# Patient Record
Sex: FEMALE | Race: ASIAN
[De-identification: names, ages, dates, MRNs, and addresses within clinical notes are randomized per-mention and may not be internally consistent; named-entity substitution may affect disease eponyms.]

---

## 2018-01-09 ENCOUNTER — HOSPITAL ENCOUNTER (EMERGENCY)
Dept: HOSPITAL 15 - ER | Age: 78
LOS: 1 days | Discharge: LEFT BEFORE DISCHARGE | End: 2018-01-10
Payer: MEDICARE

## 2018-01-09 VITALS — BODY MASS INDEX: 24.8 KG/M2 | HEIGHT: 63 IN | WEIGHT: 140 LBS

## 2018-01-09 DIAGNOSIS — J06.9: Primary | ICD-10-CM

## 2018-01-09 DIAGNOSIS — K72.90: ICD-10-CM

## 2018-01-09 LAB
ALBUMIN SERPL-MCNC: 3.5 G/DL (ref 3.4–5)
ALP SERPL-CCNC: 127 U/L (ref 45–117)
ALT SERPL-CCNC: 476 U/L (ref 13–56)
ANION GAP SERPL CALCULATED.3IONS-SCNC: 10 MMOL/L (ref 5–15)
BILIRUB SERPL-MCNC: 1.3 MG/DL (ref 0.2–1)
BUN SERPL-MCNC: 20 MG/DL (ref 7–18)
BUN/CREAT SERPL: 21.1
CALCIUM SERPL-MCNC: 8.3 MG/DL (ref 8.5–10.1)
CHLORIDE SERPL-SCNC: 112 MMOL/L (ref 98–107)
CO2 SERPL-SCNC: 22 MMOL/L (ref 21–32)
GLUCOSE SERPL-MCNC: 94 MG/DL (ref 74–106)
HCT VFR BLD AUTO: 38.5 % (ref 36–46)
HGB BLD-MCNC: 12.9 G/DL (ref 12.2–16.2)
MCH RBC QN AUTO: 30.9 PG (ref 28–32)
MCV RBC AUTO: 92.2 FL (ref 80–100)
NRBC BLD QL AUTO: 0.2 %
POTASSIUM SERPL-SCNC: 4.4 MMOL/L (ref 3.5–5.1)
PROT SERPL-MCNC: 7.4 G/DL (ref 6.4–8.2)
SODIUM SERPL-SCNC: 144 MMOL/L (ref 136–145)

## 2018-01-09 PROCEDURE — 85025 COMPLETE CBC W/AUTO DIFF WBC: CPT

## 2018-01-09 PROCEDURE — 93005 ELECTROCARDIOGRAM TRACING: CPT

## 2018-01-09 PROCEDURE — 81001 URINALYSIS AUTO W/SCOPE: CPT

## 2018-01-09 PROCEDURE — 71046 X-RAY EXAM CHEST 2 VIEWS: CPT

## 2018-01-09 PROCEDURE — 36415 COLL VENOUS BLD VENIPUNCTURE: CPT

## 2018-01-09 PROCEDURE — 80053 COMPREHEN METABOLIC PANEL: CPT

## 2018-01-10 VITALS — DIASTOLIC BLOOD PRESSURE: 62 MMHG | SYSTOLIC BLOOD PRESSURE: 106 MMHG

## 2019-07-12 ENCOUNTER — HOSPITAL ENCOUNTER (INPATIENT)
Dept: HOSPITAL 15 - ER | Age: 79
LOS: 3 days | Discharge: HOME | DRG: 602 | End: 2019-07-15
Attending: FAMILY MEDICINE | Admitting: NURSE PRACTITIONER
Payer: MEDICARE

## 2019-07-12 VITALS — HEIGHT: 63 IN | WEIGHT: 161.38 LBS | BODY MASS INDEX: 28.59 KG/M2

## 2019-07-12 DIAGNOSIS — I11.0: ICD-10-CM

## 2019-07-12 DIAGNOSIS — K21.9: ICD-10-CM

## 2019-07-12 DIAGNOSIS — Z95.0: ICD-10-CM

## 2019-07-12 DIAGNOSIS — Z88.0: ICD-10-CM

## 2019-07-12 DIAGNOSIS — L02.31: Primary | ICD-10-CM

## 2019-07-12 DIAGNOSIS — G47.00: ICD-10-CM

## 2019-07-12 DIAGNOSIS — R62.7: ICD-10-CM

## 2019-07-12 DIAGNOSIS — Z79.899: ICD-10-CM

## 2019-07-12 DIAGNOSIS — J18.9: ICD-10-CM

## 2019-07-12 DIAGNOSIS — Z88.2: ICD-10-CM

## 2019-07-12 DIAGNOSIS — I50.9: ICD-10-CM

## 2019-07-12 DIAGNOSIS — L03.317: ICD-10-CM

## 2019-07-12 LAB
ALBUMIN SERPL-MCNC: 3 G/DL (ref 3.4–5)
ALP SERPL-CCNC: 105 U/L (ref 45–117)
ALT SERPL-CCNC: 12 U/L (ref 13–56)
ANION GAP SERPL CALCULATED.3IONS-SCNC: 10 MMOL/L (ref 5–15)
APTT PPP: 26.3 SEC (ref 23.64–32.05)
BILIRUB SERPL-MCNC: 0.3 MG/DL (ref 0.2–1)
BUN SERPL-MCNC: 18 MG/DL (ref 7–18)
BUN/CREAT SERPL: 20.2
CALCIUM SERPL-MCNC: 8 MG/DL (ref 8.5–10.1)
CHLORIDE SERPL-SCNC: 111 MMOL/L (ref 98–107)
CO2 SERPL-SCNC: 22 MMOL/L (ref 21–32)
GLUCOSE SERPL-MCNC: 98 MG/DL (ref 74–106)
HCT VFR BLD AUTO: 35 % (ref 36–46)
HGB BLD-MCNC: 11 G/DL (ref 12.2–16.2)
INR PPP: 1 (ref 0.9–1.15)
LACTATE PLASV-SCNC: 2.9 MMOL/L (ref 0.4–2)
MCH RBC QN AUTO: 24.2 PG (ref 28–32)
MCV RBC AUTO: 76.7 FL (ref 80–100)
NRBC BLD QL AUTO: 0 %
POTASSIUM SERPL-SCNC: 4 MMOL/L (ref 3.5–5.1)
PROT SERPL-MCNC: 7.1 G/DL (ref 6.4–8.2)
SODIUM SERPL-SCNC: 143 MMOL/L (ref 136–145)

## 2019-07-12 PROCEDURE — 83605 ASSAY OF LACTIC ACID: CPT

## 2019-07-12 PROCEDURE — 81001 URINALYSIS AUTO W/SCOPE: CPT

## 2019-07-12 PROCEDURE — 84484 ASSAY OF TROPONIN QUANT: CPT

## 2019-07-12 PROCEDURE — 83880 ASSAY OF NATRIURETIC PEPTIDE: CPT

## 2019-07-12 PROCEDURE — 85025 COMPLETE CBC W/AUTO DIFF WBC: CPT

## 2019-07-12 PROCEDURE — 96365 THER/PROPH/DIAG IV INF INIT: CPT

## 2019-07-12 PROCEDURE — 80048 BASIC METABOLIC PNL TOTAL CA: CPT

## 2019-07-12 PROCEDURE — 85610 PROTHROMBIN TIME: CPT

## 2019-07-12 PROCEDURE — 85730 THROMBOPLASTIN TIME PARTIAL: CPT

## 2019-07-12 PROCEDURE — 71045 X-RAY EXAM CHEST 1 VIEW: CPT

## 2019-07-12 PROCEDURE — 93005 ELECTROCARDIOGRAM TRACING: CPT

## 2019-07-12 PROCEDURE — 87040 BLOOD CULTURE FOR BACTERIA: CPT

## 2019-07-12 PROCEDURE — 36415 COLL VENOUS BLD VENIPUNCTURE: CPT

## 2019-07-12 PROCEDURE — 80053 COMPREHEN METABOLIC PANEL: CPT

## 2019-07-12 PROCEDURE — 94640 AIRWAY INHALATION TREATMENT: CPT

## 2019-07-13 VITALS — SYSTOLIC BLOOD PRESSURE: 94 MMHG | DIASTOLIC BLOOD PRESSURE: 49 MMHG

## 2019-07-13 VITALS — SYSTOLIC BLOOD PRESSURE: 111 MMHG | DIASTOLIC BLOOD PRESSURE: 62 MMHG

## 2019-07-13 VITALS — DIASTOLIC BLOOD PRESSURE: 70 MMHG | SYSTOLIC BLOOD PRESSURE: 115 MMHG

## 2019-07-13 VITALS — SYSTOLIC BLOOD PRESSURE: 96 MMHG | DIASTOLIC BLOOD PRESSURE: 52 MMHG

## 2019-07-13 VITALS — SYSTOLIC BLOOD PRESSURE: 115 MMHG | DIASTOLIC BLOOD PRESSURE: 70 MMHG

## 2019-07-13 RX ADMIN — ATENOLOL SCH MG: 25 TABLET ORAL at 21:36

## 2019-07-13 RX ADMIN — FAMOTIDINE SCH MG: 20 TABLET, FILM COATED ORAL at 21:36

## 2019-07-13 RX ADMIN — FAMOTIDINE SCH MG: 20 TABLET, FILM COATED ORAL at 10:00

## 2019-07-13 RX ADMIN — CLINDAMYCIN PHOSPHATE SCH MLS/HR: 150 INJECTION, SOLUTION INTRAVENOUS at 21:36

## 2019-07-13 RX ADMIN — HYDROCODONE BITARTRATE AND ACETAMINOPHEN PRN TAB: 5; 325 TABLET ORAL at 07:19

## 2019-07-13 RX ADMIN — CLINDAMYCIN PHOSPHATE SCH MLS/HR: 150 INJECTION, SOLUTION INTRAVENOUS at 05:27

## 2019-07-13 RX ADMIN — ATENOLOL SCH MG: 25 TABLET ORAL at 10:00

## 2019-07-13 RX ADMIN — HYDROCODONE BITARTRATE AND ACETAMINOPHEN PRN TAB: 5; 325 TABLET ORAL at 18:54

## 2019-07-13 RX ADMIN — CLINDAMYCIN PHOSPHATE SCH MLS/HR: 150 INJECTION, SOLUTION INTRAVENOUS at 14:28

## 2019-07-13 NOTE — NUR
MS admit from ER

DAVID MARS admitted to tele/MS after SBAR received.  Patient oriented to Carly Garcia, primary RN, unit, room, bed, and unit policies regarding patient care and visiting 
hours. Patient weighed by bedscale and encouraged to call if they need something. All 
questions and concerns addressed, patient verbalized understanding. 

Note:

## 2019-07-13 NOTE — NUR
Respiratory note: ASSESSED PT FOR PRN MED NEB AT THIS TIME, NO RESP DISTRESS NOTED, NO TX 
INDICATED, PULSE OX 93% ON RA, HR 78, RR 22, BILATERAL BS CRACKLES.

## 2019-07-13 NOTE — NUR
OPENING SHIFT NOTE: PATIENT IS RESTING IN BED. NO COMPLAINTS OF PAIN OR SOB. SITTER IS 
BEDSIDE. CALL LIGHT IS WITHIN REACH. BED IS LOCKED IN LOWEST POSITION AND SIDE RAILS UP X2. 
WILL CONTINUE TO MONITOR.

## 2019-07-13 NOTE — NUR
Respiratory note:

ASSESSED PT FOR PRN TX PT WAS AWAKE AND ALERT NO RESP DISTRESS NOTED. HR 79, RR 16, SPO2 96% 
ON ROOM AIR. BS ARE CLEAR, NO INDICATION FOR TX AT THIS TIME. PT KNOWS TO HAVE RT PAGED IF 
TX IS NEEDED.

## 2019-07-13 NOTE — NUR
PT STATES SHE IS IN PAIN-BUT IS REFUSING TYLENOL WHICH IS WHAT THE MD HAS ORDERED FOR HER.PT 
STATES SHE WANTS THREE "STRESS PILLS' BUT DOES NOT KNOW THE NAME OF THE STRESS PILLS.PT 
AMBULATED TO THE BR WITH STANDBY ASSISTANCE;BACK IN BED. PT INFORMED THE  RN IN [place]ian VIA 
CNA   THAT THE MD DOWNSTAIRS SAID WE WERE TO GIVE HER ANYTHING SHE WANTS.

## 2019-07-13 NOTE — NUR
Opening Shift Note

Assumed care of patient, awake and alert.  No S/S of distress/SOB or pain.  Instructed on 
POC and to call for assist PRN, will continue to monitor for changes Q1hr and PRN. Bed 
locked in lowest position with two side rails up and call light  in reach.

## 2019-07-14 VITALS — SYSTOLIC BLOOD PRESSURE: 93 MMHG | DIASTOLIC BLOOD PRESSURE: 51 MMHG

## 2019-07-14 VITALS — SYSTOLIC BLOOD PRESSURE: 93 MMHG | DIASTOLIC BLOOD PRESSURE: 54 MMHG

## 2019-07-14 VITALS — DIASTOLIC BLOOD PRESSURE: 75 MMHG | SYSTOLIC BLOOD PRESSURE: 126 MMHG

## 2019-07-14 VITALS — SYSTOLIC BLOOD PRESSURE: 101 MMHG | DIASTOLIC BLOOD PRESSURE: 61 MMHG

## 2019-07-14 VITALS — DIASTOLIC BLOOD PRESSURE: 61 MMHG | SYSTOLIC BLOOD PRESSURE: 101 MMHG

## 2019-07-14 LAB
ANION GAP SERPL CALCULATED.3IONS-SCNC: 8 MMOL/L (ref 5–15)
BUN SERPL-MCNC: 19 MG/DL (ref 7–18)
BUN/CREAT SERPL: 27.9
CALCIUM SERPL-MCNC: 8.2 MG/DL (ref 8.5–10.1)
CHLORIDE SERPL-SCNC: 113 MMOL/L (ref 98–107)
CO2 SERPL-SCNC: 21 MMOL/L (ref 21–32)
GLUCOSE SERPL-MCNC: 81 MG/DL (ref 74–106)
HCT VFR BLD AUTO: 33.6 % (ref 36–46)
HGB BLD-MCNC: 10.8 G/DL (ref 12.2–16.2)
MCH RBC QN AUTO: 24.9 PG (ref 28–32)
MCV RBC AUTO: 77.6 FL (ref 80–100)
NRBC BLD QL AUTO: 0.1 %
POTASSIUM SERPL-SCNC: 4.7 MMOL/L (ref 3.5–5.1)
SODIUM SERPL-SCNC: 142 MMOL/L (ref 136–145)

## 2019-07-14 RX ADMIN — HYDROCODONE BITARTRATE AND ACETAMINOPHEN PRN TAB: 5; 325 TABLET ORAL at 06:29

## 2019-07-14 RX ADMIN — HYDROCODONE BITARTRATE AND ACETAMINOPHEN PRN TAB: 5; 325 TABLET ORAL at 18:38

## 2019-07-14 RX ADMIN — FAMOTIDINE SCH MG: 20 TABLET, FILM COATED ORAL at 10:00

## 2019-07-14 RX ADMIN — CLINDAMYCIN PHOSPHATE SCH MLS/HR: 150 INJECTION, SOLUTION INTRAVENOUS at 22:01

## 2019-07-14 RX ADMIN — CLINDAMYCIN PHOSPHATE SCH MLS/HR: 150 INJECTION, SOLUTION INTRAVENOUS at 06:29

## 2019-07-14 RX ADMIN — CLINDAMYCIN PHOSPHATE SCH MLS/HR: 150 INJECTION, SOLUTION INTRAVENOUS at 13:37

## 2019-07-14 RX ADMIN — ATENOLOL SCH MG: 25 TABLET ORAL at 10:00

## 2019-07-14 RX ADMIN — ATENOLOL SCH MG: 25 TABLET ORAL at 22:00

## 2019-07-14 RX ADMIN — FAMOTIDINE SCH MG: 20 TABLET, FILM COATED ORAL at 22:00

## 2019-07-14 NOTE — NUR
CALLED KELLY [facility]AR[facility] -[phone] CELL NUMBER

PER SON HE WORKS FROM [phone] DAILY AND WILL NOT BE ABLE TO PICK HER UP UNTIL AFTER 1530 
MONDAY 7/15/19. 

EVEN THOUGH HE WORKS DAILY SHE DOES HAVE A  HOME HEALTH AGENCY THAT COMES OUT FROM [phone] 
DAILY, PER SON HE DOES NOT KNOW HEALTH CARE AGENCY NAME.

PER KELLY FARIA IT IS OK TO DISCHARGE HOME TOMORROW.

WILL LET DR GARDNER KNOW

## 2019-07-14 NOTE — NUR
RT NOTE: PRN BREATHING TX. NOT INDICATED AT THIS TIME. NO S/S OF RESPIRATORY DISTRESS NOTED. 
PT.HR 68, RR 20, POX 96% R/A. SITTER AT BEDSIDE.

## 2019-07-14 NOTE — NUR
Respiratory note:

ASSESSED PT FOR PRN TX. PT IS CURRENTLY ON ROOM AIR: HR 70, RR 18, SPO2 96%. PT SHOWS NO  
S/S OF RESPIRATORY DISTRESS. MED NEB TX NOT INDICATED AT THIS TIME. WILL CONTINUE TO 
MONITOR.

## 2019-07-14 NOTE — NUR
PATIENTS BLOOD PRESSURES HAVE BEEN ON THE LOWER SIDE THE PAST TWO DAYS LAST BP FOR MY SHIFT 
94/62 HEART RATE 65. ASYMPTOMATIC AMBULATORY.

## 2019-07-15 VITALS — DIASTOLIC BLOOD PRESSURE: 70 MMHG | SYSTOLIC BLOOD PRESSURE: 144 MMHG

## 2019-07-15 VITALS — DIASTOLIC BLOOD PRESSURE: 79 MMHG | SYSTOLIC BLOOD PRESSURE: 146 MMHG

## 2019-07-15 VITALS — DIASTOLIC BLOOD PRESSURE: 51 MMHG | SYSTOLIC BLOOD PRESSURE: 105 MMHG

## 2019-07-15 RX ADMIN — CLINDAMYCIN PHOSPHATE SCH MLS/HR: 150 INJECTION, SOLUTION INTRAVENOUS at 08:40

## 2019-07-15 RX ADMIN — HYDROCODONE BITARTRATE AND ACETAMINOPHEN PRN TAB: 5; 325 TABLET ORAL at 08:40

## 2019-07-15 RX ADMIN — FAMOTIDINE SCH MG: 20 TABLET, FILM COATED ORAL at 10:00

## 2019-07-15 RX ADMIN — ATENOLOL SCH MG: 25 TABLET ORAL at 10:00

## 2019-07-15 NOTE — NUR
Discharge instructions given as ordered. Encourage to follow up with PMD as instructed. All 
questions and concerns addressed. Patient verbalized understanding.  Medication 
reconciliation form completed and copy given to patient.  IV removed with catheter intact, 
pressure dressing applied. Patient taken to vehicle via wheelchair with all personal 
belongings, accompanied by staff and family member. No distress noted at time of departure.

## 2019-07-15 NOTE — NUR
assessment

Patient is a 78 year old Kittitian speaking female who is alert and oriented. Patients 
caregiver Bertha is translating for us. Patients PCP is Dr Andrews. Patient has a rollator for 
home use. Patient has been informed that she has a ss consult for patient is unable to take 
care of herself. 2nd ss consult SNF placement. Patient is refusing SNF. Patient will return 
home with caregiver and son on discharge.  Patient and her caregiver Bertha informed me that 
patient is never alone. Per Bertha she comes in from 8am to 3pm and patients son Jerome is 
with patient from 3pm until 8am. Patient feels safe at home and has no post discharge needs. 

-------------------------------------------------------------------------------

Addendum: 07/15/19 at 1655 by Enedina Chacon 

-------------------------------------------------------------------------------

Amended: Links added.

## 2019-07-15 NOTE — NUR
Respiratory note:

ASSESSED PT FOR PRN MEDNEB TX. HR 71, RR 16, POX 98% ON ROOM AIR. BREATH SOUNDS 
CLEAR/DIMINISHED THROUGHOUT. NO S/S OF RESPIRATORY DISTRESS NOTED. MEDNEB TX NOT INDICATED 
AT THIS TIME. WROTE RT NAME AND PAGER NUMBER ON WHITEBOARD. WILL CONTINUE TO MONITOR.

## 2020-06-23 ENCOUNTER — HOSPITAL ENCOUNTER (INPATIENT)
Dept: HOSPITAL 15 - ER | Age: 80
LOS: 14 days | Discharge: NURSING, REHAB OR LONG-TERM CARE | DRG: 870 | End: 2020-07-07
Attending: SPECIALIST | Admitting: SPECIALIST
Payer: MEDICARE

## 2020-06-23 VITALS — DIASTOLIC BLOOD PRESSURE: 62 MMHG | SYSTOLIC BLOOD PRESSURE: 104 MMHG

## 2020-06-23 VITALS — DIASTOLIC BLOOD PRESSURE: 62 MMHG | SYSTOLIC BLOOD PRESSURE: 99 MMHG

## 2020-06-23 VITALS — DIASTOLIC BLOOD PRESSURE: 62 MMHG | SYSTOLIC BLOOD PRESSURE: 100 MMHG

## 2020-06-23 VITALS — BODY MASS INDEX: 33.22 KG/M2 | WEIGHT: 175.93 LBS | HEIGHT: 61 IN

## 2020-06-23 DIAGNOSIS — K92.0: ICD-10-CM

## 2020-06-23 DIAGNOSIS — G93.41: ICD-10-CM

## 2020-06-23 DIAGNOSIS — I11.0: ICD-10-CM

## 2020-06-23 DIAGNOSIS — J96.00: ICD-10-CM

## 2020-06-23 DIAGNOSIS — J15.212: ICD-10-CM

## 2020-06-23 DIAGNOSIS — N17.9: ICD-10-CM

## 2020-06-23 DIAGNOSIS — B96.20: ICD-10-CM

## 2020-06-23 DIAGNOSIS — Z88.2: ICD-10-CM

## 2020-06-23 DIAGNOSIS — I21.3: ICD-10-CM

## 2020-06-23 DIAGNOSIS — Z95.0: ICD-10-CM

## 2020-06-23 DIAGNOSIS — Z82.49: ICD-10-CM

## 2020-06-23 DIAGNOSIS — A41.51: Primary | ICD-10-CM

## 2020-06-23 DIAGNOSIS — E87.6: ICD-10-CM

## 2020-06-23 DIAGNOSIS — R65.21: ICD-10-CM

## 2020-06-23 DIAGNOSIS — M06.9: ICD-10-CM

## 2020-06-23 DIAGNOSIS — Z20.828: ICD-10-CM

## 2020-06-23 DIAGNOSIS — Z88.0: ICD-10-CM

## 2020-06-23 DIAGNOSIS — K92.2: ICD-10-CM

## 2020-06-23 DIAGNOSIS — E11.9: ICD-10-CM

## 2020-06-23 DIAGNOSIS — E46: ICD-10-CM

## 2020-06-23 DIAGNOSIS — I25.10: ICD-10-CM

## 2020-06-23 DIAGNOSIS — N30.00: ICD-10-CM

## 2020-06-23 DIAGNOSIS — E87.2: ICD-10-CM

## 2020-06-23 DIAGNOSIS — I50.31: ICD-10-CM

## 2020-06-23 DIAGNOSIS — E86.0: ICD-10-CM

## 2020-06-23 LAB
ALBUMIN SERPL-MCNC: 2.7 G/DL (ref 3.4–5)
ALBUMIN SERPL-MCNC: 2.7 G/DL (ref 3.4–5)
ALP SERPL-CCNC: 58 U/L (ref 45–117)
ALP SERPL-CCNC: 58 U/L (ref 45–117)
ALT SERPL-CCNC: 23 U/L (ref 13–56)
ALT SERPL-CCNC: 24 U/L (ref 13–56)
ANION GAP SERPL CALCULATED.3IONS-SCNC: 18 MMOL/L (ref 5–15)
APTT PPP: 29.2 SEC (ref 23.64–32.05)
BILIRUB DIRECT SERPL-MCNC: 0.3 MG/DL (ref 0–0.2)
BILIRUB SERPL-MCNC: 0.8 MG/DL (ref 0.2–1)
BILIRUB SERPL-MCNC: 0.8 MG/DL (ref 0.2–1)
BUN SERPL-MCNC: 39 MG/DL (ref 7–18)
BUN/CREAT SERPL: 19.4
CALCIUM SERPL-MCNC: 9 MG/DL (ref 8.5–10.1)
CHLORIDE SERPL-SCNC: 113 MMOL/L (ref 98–107)
CO2 SERPL-SCNC: 16 MMOL/L (ref 21–32)
GLUCOSE SERPL-MCNC: 99 MG/DL (ref 74–106)
HCT VFR BLD AUTO: 33.3 % (ref 36–46)
HGB BLD-MCNC: 10.5 G/DL (ref 12.2–16.2)
INR PPP: 1.23 (ref 0.9–1.15)
LACTATE PLASV-SCNC: 10.9 MMOL/L (ref 0.4–2)
MAGNESIUM SERPL-MCNC: 2.3 MG/DL (ref 1.6–2.6)
MCH RBC QN AUTO: 25.4 PG (ref 28–32)
MCV RBC AUTO: 80.2 FL (ref 80–100)
NRBC BLD QL AUTO: 0.1 %
POTASSIUM SERPL-SCNC: 4.6 MMOL/L (ref 3.5–5.1)
PROT SERPL-MCNC: 6.3 G/DL (ref 6.4–8.2)
PROT SERPL-MCNC: 6.3 G/DL (ref 6.4–8.2)
SODIUM SERPL-SCNC: 147 MMOL/L (ref 136–145)

## 2020-06-23 PROCEDURE — 83735 ASSAY OF MAGNESIUM: CPT

## 2020-06-23 PROCEDURE — 87880 STREP A ASSAY W/OPTIC: CPT

## 2020-06-23 PROCEDURE — 97163 PT EVAL HIGH COMPLEX 45 MIN: CPT

## 2020-06-23 PROCEDURE — 87804 INFLUENZA ASSAY W/OPTIC: CPT

## 2020-06-23 PROCEDURE — 36600 WITHDRAWAL OF ARTERIAL BLOOD: CPT

## 2020-06-23 PROCEDURE — 87088 URINE BACTERIA CULTURE: CPT

## 2020-06-23 PROCEDURE — 87086 URINE CULTURE/COLONY COUNT: CPT

## 2020-06-23 PROCEDURE — 84132 ASSAY OF SERUM POTASSIUM: CPT

## 2020-06-23 PROCEDURE — 87205 SMEAR GRAM STAIN: CPT

## 2020-06-23 PROCEDURE — 87077 CULTURE AEROBIC IDENTIFY: CPT

## 2020-06-23 PROCEDURE — 86850 RBC ANTIBODY SCREEN: CPT

## 2020-06-23 PROCEDURE — 83605 ASSAY OF LACTIC ACID: CPT

## 2020-06-23 PROCEDURE — 36556 INSERT NON-TUNNEL CV CATH: CPT

## 2020-06-23 PROCEDURE — 87081 CULTURE SCREEN ONLY: CPT

## 2020-06-23 PROCEDURE — 94660 CPAP INITIATION&MGMT: CPT

## 2020-06-23 PROCEDURE — 97110 THERAPEUTIC EXERCISES: CPT

## 2020-06-23 PROCEDURE — 84484 ASSAY OF TROPONIN QUANT: CPT

## 2020-06-23 PROCEDURE — 94640 AIRWAY INHALATION TREATMENT: CPT

## 2020-06-23 PROCEDURE — 93005 ELECTROCARDIOGRAM TRACING: CPT

## 2020-06-23 PROCEDURE — 85730 THROMBOPLASTIN TIME PARTIAL: CPT

## 2020-06-23 PROCEDURE — 80048 BASIC METABOLIC PNL TOTAL CA: CPT

## 2020-06-23 PROCEDURE — 85014 HEMATOCRIT: CPT

## 2020-06-23 PROCEDURE — 87340 HEPATITIS B SURFACE AG IA: CPT

## 2020-06-23 PROCEDURE — 51702 INSERT TEMP BLADDER CATH: CPT

## 2020-06-23 PROCEDURE — 36415 COLL VENOUS BLD VENIPUNCTURE: CPT

## 2020-06-23 PROCEDURE — 80202 ASSAY OF VANCOMYCIN: CPT

## 2020-06-23 PROCEDURE — 70450 CT HEAD/BRAIN W/O DYE: CPT

## 2020-06-23 PROCEDURE — 74176 CT ABD & PELVIS W/O CONTRAST: CPT

## 2020-06-23 PROCEDURE — 82040 ASSAY OF SERUM ALBUMIN: CPT

## 2020-06-23 PROCEDURE — 30233N1 TRANSFUSION OF NONAUTOLOGOUS RED BLOOD CELLS INTO PERIPHERAL VEIN, PERCUTANEOUS APPROACH: ICD-10-PCS | Performed by: EMERGENCY MEDICINE

## 2020-06-23 PROCEDURE — 87070 CULTURE OTHR SPECIMN AEROBIC: CPT

## 2020-06-23 PROCEDURE — 80053 COMPREHEN METABOLIC PANEL: CPT

## 2020-06-23 PROCEDURE — 86704 HEP B CORE ANTIBODY TOTAL: CPT

## 2020-06-23 PROCEDURE — 86920 COMPATIBILITY TEST SPIN: CPT

## 2020-06-23 PROCEDURE — 02HV33Z INSERTION OF INFUSION DEVICE INTO SUPERIOR VENA CAVA, PERCUTANEOUS APPROACH: ICD-10-PCS | Performed by: SPECIALIST

## 2020-06-23 PROCEDURE — 81001 URINALYSIS AUTO W/SCOPE: CPT

## 2020-06-23 PROCEDURE — 96365 THER/PROPH/DIAG IV INF INIT: CPT

## 2020-06-23 PROCEDURE — 83615 LACTATE (LD) (LDH) ENZYME: CPT

## 2020-06-23 PROCEDURE — 0BH17EZ INSERTION OF ENDOTRACHEAL AIRWAY INTO TRACHEA, VIA NATURAL OR ARTIFICIAL OPENING: ICD-10-PCS | Performed by: EMERGENCY MEDICINE

## 2020-06-23 PROCEDURE — 94003 VENT MGMT INPAT SUBQ DAY: CPT

## 2020-06-23 PROCEDURE — 80320 DRUG SCREEN QUANTALCOHOLS: CPT

## 2020-06-23 PROCEDURE — 82805 BLOOD GASES W/O2 SATURATION: CPT

## 2020-06-23 PROCEDURE — 84478 ASSAY OF TRIGLYCERIDES: CPT

## 2020-06-23 PROCEDURE — 96372 THER/PROPH/DIAG INJ SC/IM: CPT

## 2020-06-23 PROCEDURE — 82728 ASSAY OF FERRITIN: CPT

## 2020-06-23 PROCEDURE — 5A1955Z RESPIRATORY VENTILATION, GREATER THAN 96 CONSECUTIVE HOURS: ICD-10-PCS | Performed by: EMERGENCY MEDICINE

## 2020-06-23 PROCEDURE — 99291 CRITICAL CARE FIRST HOUR: CPT

## 2020-06-23 PROCEDURE — 85018 HEMOGLOBIN: CPT

## 2020-06-23 PROCEDURE — 96375 TX/PRO/DX INJ NEW DRUG ADDON: CPT

## 2020-06-23 PROCEDURE — 06HY33Z INSERTION OF INFUSION DEVICE INTO LOWER VEIN, PERCUTANEOUS APPROACH: ICD-10-PCS | Performed by: EMERGENCY MEDICINE

## 2020-06-23 PROCEDURE — 85610 PROTHROMBIN TIME: CPT

## 2020-06-23 PROCEDURE — 97530 THERAPEUTIC ACTIVITIES: CPT

## 2020-06-23 PROCEDURE — 86706 HEP B SURFACE ANTIBODY: CPT

## 2020-06-23 PROCEDURE — 86803 HEPATITIS C AB TEST: CPT

## 2020-06-23 PROCEDURE — 85025 COMPLETE CBC W/AUTO DIFF WBC: CPT

## 2020-06-23 PROCEDURE — 94002 VENT MGMT INPAT INIT DAY: CPT

## 2020-06-23 PROCEDURE — 71045 X-RAY EXAM CHEST 1 VIEW: CPT

## 2020-06-23 PROCEDURE — 86900 BLOOD TYPING SEROLOGIC ABO: CPT

## 2020-06-23 PROCEDURE — 31500 INSERT EMERGENCY AIRWAY: CPT

## 2020-06-23 PROCEDURE — 84100 ASSAY OF PHOSPHORUS: CPT

## 2020-06-23 PROCEDURE — 87186 SC STD MICRODIL/AGAR DIL: CPT

## 2020-06-23 PROCEDURE — 86901 BLOOD TYPING SEROLOGIC RH(D): CPT

## 2020-06-23 PROCEDURE — 87040 BLOOD CULTURE FOR BACTERIA: CPT

## 2020-06-23 PROCEDURE — 80076 HEPATIC FUNCTION PANEL: CPT

## 2020-06-23 PROCEDURE — 82962 GLUCOSE BLOOD TEST: CPT

## 2020-06-23 PROCEDURE — 86703 HIV-1/HIV-2 1 RESULT ANTBDY: CPT

## 2020-06-23 PROCEDURE — 83880 ASSAY OF NATRIURETIC PEPTIDE: CPT

## 2020-06-23 RX ADMIN — MIDAZOLAM SCH MLS/HR: 5 INJECTION, SOLUTION INTRAMUSCULAR; INTRAVENOUS at 17:21

## 2020-06-23 RX ADMIN — PHENYLEPHRINE HYDROCHLORIDE SCH MLS/HR: 10 INJECTION INTRAVENOUS at 16:55

## 2020-06-23 RX ADMIN — MIDAZOLAM SCH MLS/HR: 5 INJECTION, SOLUTION INTRAMUSCULAR; INTRAVENOUS at 14:53

## 2020-06-23 RX ADMIN — DEXTROSE AND SODIUM CHLORIDE SCH MLS/HR: 5; 450 INJECTION, SOLUTION INTRAVENOUS at 17:19

## 2020-06-23 RX ADMIN — NOREPINEPHRINE BITARTRATE SCH MLS/HR: 1 INJECTION, SOLUTION, CONCENTRATE INTRAVENOUS at 15:17

## 2020-06-23 RX ADMIN — DOPAMINE HYDROCHLORIDE IN DEXTROSE SCH MLS/HR: 1.6 INJECTION, SOLUTION INTRAVENOUS at 15:30

## 2020-06-24 VITALS — SYSTOLIC BLOOD PRESSURE: 116 MMHG | DIASTOLIC BLOOD PRESSURE: 64 MMHG

## 2020-06-24 VITALS — SYSTOLIC BLOOD PRESSURE: 99 MMHG | DIASTOLIC BLOOD PRESSURE: 59 MMHG

## 2020-06-24 VITALS — DIASTOLIC BLOOD PRESSURE: 55 MMHG | SYSTOLIC BLOOD PRESSURE: 94 MMHG

## 2020-06-24 VITALS — SYSTOLIC BLOOD PRESSURE: 101 MMHG | DIASTOLIC BLOOD PRESSURE: 61 MMHG

## 2020-06-24 VITALS — DIASTOLIC BLOOD PRESSURE: 62 MMHG | SYSTOLIC BLOOD PRESSURE: 102 MMHG

## 2020-06-24 VITALS — SYSTOLIC BLOOD PRESSURE: 76 MMHG | DIASTOLIC BLOOD PRESSURE: 44 MMHG

## 2020-06-24 VITALS — DIASTOLIC BLOOD PRESSURE: 59 MMHG | SYSTOLIC BLOOD PRESSURE: 111 MMHG

## 2020-06-24 VITALS — DIASTOLIC BLOOD PRESSURE: 70 MMHG | SYSTOLIC BLOOD PRESSURE: 108 MMHG

## 2020-06-24 VITALS — DIASTOLIC BLOOD PRESSURE: 63 MMHG | SYSTOLIC BLOOD PRESSURE: 111 MMHG

## 2020-06-24 VITALS — DIASTOLIC BLOOD PRESSURE: 64 MMHG | SYSTOLIC BLOOD PRESSURE: 117 MMHG

## 2020-06-24 VITALS — DIASTOLIC BLOOD PRESSURE: 63 MMHG | SYSTOLIC BLOOD PRESSURE: 102 MMHG

## 2020-06-24 LAB
ALBUMIN SERPL-MCNC: 1.8 G/DL (ref 3.4–5)
ALP SERPL-CCNC: 51 U/L (ref 45–117)
ALT SERPL-CCNC: 99 U/L (ref 13–56)
ANION GAP SERPL CALCULATED.3IONS-SCNC: 8 MMOL/L (ref 5–15)
BILIRUB SERPL-MCNC: 1.2 MG/DL (ref 0.2–1)
BUN SERPL-MCNC: 30 MG/DL (ref 7–18)
BUN/CREAT SERPL: 25.9
CALCIUM SERPL-MCNC: 6.4 MG/DL (ref 8.5–10.1)
CHLORIDE SERPL-SCNC: 116 MMOL/L (ref 98–107)
CO2 SERPL-SCNC: 18 MMOL/L (ref 21–32)
GLUCOSE SERPL-MCNC: 241 MG/DL (ref 74–106)
HCT VFR BLD AUTO: 36.4 % (ref 36–46)
HCT VFR BLD AUTO: 37.7 % (ref 36–46)
HCT VFR BLD AUTO: 41.5 % (ref 36–46)
HGB BLD-MCNC: 11.7 G/DL (ref 12.2–16.2)
HGB BLD-MCNC: 12.1 G/DL (ref 12.2–16.2)
HGB BLD-MCNC: 12.9 G/DL (ref 12.2–16.2)
MCH RBC QN AUTO: 26.3 PG (ref 28–32)
MCV RBC AUTO: 81.6 FL (ref 80–100)
NRBC BLD QL AUTO: 0.1 %
POTASSIUM SERPL-SCNC: 3.5 MMOL/L (ref 3.5–5.1)
PROT SERPL-MCNC: 5.1 G/DL (ref 6.4–8.2)
SODIUM SERPL-SCNC: 142 MMOL/L (ref 136–145)

## 2020-06-24 RX ADMIN — DEXTROSE AND SODIUM CHLORIDE SCH MLS/HR: 5; 450 INJECTION, SOLUTION INTRAVENOUS at 01:01

## 2020-06-24 RX ADMIN — MEROPENEM SCH MLS/HR: 1 INJECTION, POWDER, FOR SOLUTION INTRAVENOUS at 22:19

## 2020-06-24 RX ADMIN — DEXTROSE AND SODIUM CHLORIDE SCH MLS/HR: 5; 450 INJECTION, SOLUTION INTRAVENOUS at 20:11

## 2020-06-24 RX ADMIN — PHENYLEPHRINE HYDROCHLORIDE SCH MLS/HR: 10 INJECTION INTRAVENOUS at 17:43

## 2020-06-24 RX ADMIN — MEROPENEM SCH MLS/HR: 1 INJECTION, POWDER, FOR SOLUTION INTRAVENOUS at 10:24

## 2020-06-24 RX ADMIN — DEXTROSE AND SODIUM CHLORIDE SCH MLS/HR: 5; 450 INJECTION, SOLUTION INTRAVENOUS at 11:58

## 2020-06-24 RX ADMIN — DOPAMINE HYDROCHLORIDE IN DEXTROSE SCH MLS/HR: 1.6 INJECTION, SOLUTION INTRAVENOUS at 07:45

## 2020-06-24 RX ADMIN — PANTOPRAZOLE SODIUM SCH MG: 40 INJECTION, POWDER, FOR SOLUTION INTRAVENOUS at 22:45

## 2020-06-24 RX ADMIN — NOREPINEPHRINE BITARTRATE SCH MLS/HR: 1 INJECTION, SOLUTION, CONCENTRATE INTRAVENOUS at 15:16

## 2020-06-24 RX ADMIN — PHENYLEPHRINE HYDROCHLORIDE SCH MLS/HR: 10 INJECTION INTRAVENOUS at 09:22

## 2020-06-24 RX ADMIN — PHENYLEPHRINE HYDROCHLORIDE SCH MLS/HR: 10 INJECTION INTRAVENOUS at 01:01

## 2020-06-24 NOTE — NUR
WOUND CARE NOTE:

Added patient to wound care monitoring list due to intubation status, low Dany 
score,putting patient to high risk for skin breakdown.  Patient is 79 years old female with 
admitting diagnosis of  Acute GI Bleed, AMI, Sepsis. Witnessed patient being transferred 
from [facility] to ICU bed in ER bed#8.  No open wound, no pressure injury noted other than 
linear scab scratches to shin.  Patient tolerated well, repositioned for comfort. SANTOS Cooper at bedside.

RECOMMENDATION: BID/PRN  cleaning and application of Barrier cream to sacral, buttocks as 
preventative , frequent turning and repositioning schedule as condition permits, 
redistribute pressure points with pillows,elevate heels on pillows, continue monitoring by 
wound care while patient is intubated.

## 2020-06-25 VITALS — SYSTOLIC BLOOD PRESSURE: 110 MMHG | DIASTOLIC BLOOD PRESSURE: 52 MMHG

## 2020-06-25 VITALS — DIASTOLIC BLOOD PRESSURE: 60 MMHG | SYSTOLIC BLOOD PRESSURE: 108 MMHG

## 2020-06-25 VITALS — DIASTOLIC BLOOD PRESSURE: 59 MMHG | SYSTOLIC BLOOD PRESSURE: 105 MMHG

## 2020-06-25 VITALS — DIASTOLIC BLOOD PRESSURE: 50 MMHG | SYSTOLIC BLOOD PRESSURE: 85 MMHG

## 2020-06-25 VITALS — DIASTOLIC BLOOD PRESSURE: 58 MMHG | SYSTOLIC BLOOD PRESSURE: 82 MMHG

## 2020-06-25 VITALS — DIASTOLIC BLOOD PRESSURE: 54 MMHG | SYSTOLIC BLOOD PRESSURE: 98 MMHG

## 2020-06-25 VITALS — SYSTOLIC BLOOD PRESSURE: 111 MMHG | DIASTOLIC BLOOD PRESSURE: 51 MMHG

## 2020-06-25 VITALS — DIASTOLIC BLOOD PRESSURE: 55 MMHG | SYSTOLIC BLOOD PRESSURE: 98 MMHG

## 2020-06-25 VITALS — DIASTOLIC BLOOD PRESSURE: 59 MMHG | SYSTOLIC BLOOD PRESSURE: 97 MMHG

## 2020-06-25 VITALS — DIASTOLIC BLOOD PRESSURE: 56 MMHG | SYSTOLIC BLOOD PRESSURE: 98 MMHG

## 2020-06-25 VITALS — DIASTOLIC BLOOD PRESSURE: 58 MMHG | SYSTOLIC BLOOD PRESSURE: 113 MMHG

## 2020-06-25 VITALS — DIASTOLIC BLOOD PRESSURE: 65 MMHG | SYSTOLIC BLOOD PRESSURE: 104 MMHG

## 2020-06-25 VITALS — SYSTOLIC BLOOD PRESSURE: 105 MMHG | DIASTOLIC BLOOD PRESSURE: 56 MMHG

## 2020-06-25 VITALS — DIASTOLIC BLOOD PRESSURE: 58 MMHG | SYSTOLIC BLOOD PRESSURE: 103 MMHG

## 2020-06-25 VITALS — DIASTOLIC BLOOD PRESSURE: 64 MMHG | SYSTOLIC BLOOD PRESSURE: 118 MMHG

## 2020-06-25 VITALS — DIASTOLIC BLOOD PRESSURE: 63 MMHG | SYSTOLIC BLOOD PRESSURE: 108 MMHG

## 2020-06-25 VITALS — SYSTOLIC BLOOD PRESSURE: 87 MMHG | DIASTOLIC BLOOD PRESSURE: 57 MMHG

## 2020-06-25 VITALS — SYSTOLIC BLOOD PRESSURE: 93 MMHG | DIASTOLIC BLOOD PRESSURE: 52 MMHG

## 2020-06-25 VITALS — SYSTOLIC BLOOD PRESSURE: 108 MMHG | DIASTOLIC BLOOD PRESSURE: 62 MMHG

## 2020-06-25 VITALS — SYSTOLIC BLOOD PRESSURE: 88 MMHG | DIASTOLIC BLOOD PRESSURE: 67 MMHG

## 2020-06-25 VITALS — DIASTOLIC BLOOD PRESSURE: 64 MMHG | SYSTOLIC BLOOD PRESSURE: 113 MMHG

## 2020-06-25 VITALS — SYSTOLIC BLOOD PRESSURE: 105 MMHG | DIASTOLIC BLOOD PRESSURE: 58 MMHG

## 2020-06-25 VITALS — DIASTOLIC BLOOD PRESSURE: 54 MMHG | SYSTOLIC BLOOD PRESSURE: 97 MMHG

## 2020-06-25 VITALS — DIASTOLIC BLOOD PRESSURE: 69 MMHG | SYSTOLIC BLOOD PRESSURE: 103 MMHG

## 2020-06-25 VITALS — SYSTOLIC BLOOD PRESSURE: 95 MMHG | DIASTOLIC BLOOD PRESSURE: 60 MMHG

## 2020-06-25 VITALS — DIASTOLIC BLOOD PRESSURE: 60 MMHG | SYSTOLIC BLOOD PRESSURE: 102 MMHG

## 2020-06-25 VITALS — SYSTOLIC BLOOD PRESSURE: 101 MMHG | DIASTOLIC BLOOD PRESSURE: 59 MMHG

## 2020-06-25 VITALS — DIASTOLIC BLOOD PRESSURE: 65 MMHG | SYSTOLIC BLOOD PRESSURE: 99 MMHG

## 2020-06-25 VITALS — DIASTOLIC BLOOD PRESSURE: 47 MMHG | SYSTOLIC BLOOD PRESSURE: 78 MMHG

## 2020-06-25 VITALS — DIASTOLIC BLOOD PRESSURE: 57 MMHG | SYSTOLIC BLOOD PRESSURE: 94 MMHG

## 2020-06-25 VITALS — DIASTOLIC BLOOD PRESSURE: 57 MMHG | SYSTOLIC BLOOD PRESSURE: 101 MMHG

## 2020-06-25 VITALS — SYSTOLIC BLOOD PRESSURE: 94 MMHG | DIASTOLIC BLOOD PRESSURE: 61 MMHG

## 2020-06-25 VITALS — DIASTOLIC BLOOD PRESSURE: 57 MMHG | SYSTOLIC BLOOD PRESSURE: 106 MMHG

## 2020-06-25 VITALS — SYSTOLIC BLOOD PRESSURE: 105 MMHG | DIASTOLIC BLOOD PRESSURE: 62 MMHG

## 2020-06-25 VITALS — SYSTOLIC BLOOD PRESSURE: 99 MMHG | DIASTOLIC BLOOD PRESSURE: 60 MMHG

## 2020-06-25 VITALS — SYSTOLIC BLOOD PRESSURE: 99 MMHG | DIASTOLIC BLOOD PRESSURE: 53 MMHG

## 2020-06-25 VITALS — DIASTOLIC BLOOD PRESSURE: 55 MMHG | SYSTOLIC BLOOD PRESSURE: 104 MMHG

## 2020-06-25 VITALS — DIASTOLIC BLOOD PRESSURE: 58 MMHG | SYSTOLIC BLOOD PRESSURE: 112 MMHG

## 2020-06-25 VITALS — SYSTOLIC BLOOD PRESSURE: 97 MMHG | DIASTOLIC BLOOD PRESSURE: 56 MMHG

## 2020-06-25 VITALS — SYSTOLIC BLOOD PRESSURE: 90 MMHG | DIASTOLIC BLOOD PRESSURE: 54 MMHG

## 2020-06-25 VITALS — SYSTOLIC BLOOD PRESSURE: 108 MMHG | DIASTOLIC BLOOD PRESSURE: 64 MMHG

## 2020-06-25 VITALS — DIASTOLIC BLOOD PRESSURE: 49 MMHG | SYSTOLIC BLOOD PRESSURE: 79 MMHG

## 2020-06-25 VITALS — SYSTOLIC BLOOD PRESSURE: 109 MMHG | DIASTOLIC BLOOD PRESSURE: 62 MMHG

## 2020-06-25 VITALS — DIASTOLIC BLOOD PRESSURE: 57 MMHG | SYSTOLIC BLOOD PRESSURE: 90 MMHG

## 2020-06-25 VITALS — SYSTOLIC BLOOD PRESSURE: 106 MMHG | DIASTOLIC BLOOD PRESSURE: 57 MMHG

## 2020-06-25 VITALS — SYSTOLIC BLOOD PRESSURE: 114 MMHG | DIASTOLIC BLOOD PRESSURE: 58 MMHG

## 2020-06-25 VITALS — SYSTOLIC BLOOD PRESSURE: 101 MMHG | DIASTOLIC BLOOD PRESSURE: 60 MMHG

## 2020-06-25 VITALS — DIASTOLIC BLOOD PRESSURE: 58 MMHG | SYSTOLIC BLOOD PRESSURE: 108 MMHG

## 2020-06-25 VITALS — SYSTOLIC BLOOD PRESSURE: 105 MMHG | DIASTOLIC BLOOD PRESSURE: 63 MMHG

## 2020-06-25 VITALS — DIASTOLIC BLOOD PRESSURE: 60 MMHG | SYSTOLIC BLOOD PRESSURE: 107 MMHG

## 2020-06-25 VITALS — SYSTOLIC BLOOD PRESSURE: 109 MMHG | DIASTOLIC BLOOD PRESSURE: 64 MMHG

## 2020-06-25 VITALS — SYSTOLIC BLOOD PRESSURE: 93 MMHG | DIASTOLIC BLOOD PRESSURE: 45 MMHG

## 2020-06-25 VITALS — SYSTOLIC BLOOD PRESSURE: 104 MMHG | DIASTOLIC BLOOD PRESSURE: 55 MMHG

## 2020-06-25 VITALS — SYSTOLIC BLOOD PRESSURE: 98 MMHG | DIASTOLIC BLOOD PRESSURE: 56 MMHG

## 2020-06-25 VITALS — SYSTOLIC BLOOD PRESSURE: 100 MMHG | DIASTOLIC BLOOD PRESSURE: 62 MMHG

## 2020-06-25 VITALS — DIASTOLIC BLOOD PRESSURE: 54 MMHG | SYSTOLIC BLOOD PRESSURE: 106 MMHG

## 2020-06-25 VITALS — DIASTOLIC BLOOD PRESSURE: 56 MMHG | SYSTOLIC BLOOD PRESSURE: 101 MMHG

## 2020-06-25 VITALS — DIASTOLIC BLOOD PRESSURE: 59 MMHG | SYSTOLIC BLOOD PRESSURE: 107 MMHG

## 2020-06-25 VITALS — DIASTOLIC BLOOD PRESSURE: 58 MMHG | SYSTOLIC BLOOD PRESSURE: 106 MMHG

## 2020-06-25 VITALS — DIASTOLIC BLOOD PRESSURE: 58 MMHG | SYSTOLIC BLOOD PRESSURE: 98 MMHG

## 2020-06-25 VITALS — SYSTOLIC BLOOD PRESSURE: 95 MMHG | DIASTOLIC BLOOD PRESSURE: 65 MMHG

## 2020-06-25 VITALS — SYSTOLIC BLOOD PRESSURE: 106 MMHG | DIASTOLIC BLOOD PRESSURE: 58 MMHG

## 2020-06-25 LAB
ANION GAP SERPL CALCULATED.3IONS-SCNC: 7 MMOL/L (ref 5–15)
BUN SERPL-MCNC: 14 MG/DL (ref 7–18)
BUN/CREAT SERPL: 18.4
CALCIUM SERPL-MCNC: 6.7 MG/DL (ref 8.5–10.1)
CHLORIDE SERPL-SCNC: 110 MMOL/L (ref 98–107)
CO2 SERPL-SCNC: 22 MMOL/L (ref 21–32)
GLUCOSE SERPL-MCNC: 244 MG/DL (ref 74–106)
HCT VFR BLD AUTO: 41 % (ref 36–46)
HGB BLD-MCNC: 13.1 G/DL (ref 12.2–16.2)
MAGNESIUM SERPL-MCNC: 1.5 MG/DL (ref 1.6–2.6)
POTASSIUM SERPL-SCNC: 3.1 MMOL/L (ref 3.5–5.1)
POTASSIUM SERPL-SCNC: 3.3 MMOL/L (ref 3.5–5.1)
SODIUM SERPL-SCNC: 139 MMOL/L (ref 136–145)

## 2020-06-25 RX ADMIN — MIDAZOLAM SCH MLS/HR: 5 INJECTION, SOLUTION INTRAMUSCULAR; INTRAVENOUS at 12:50

## 2020-06-25 RX ADMIN — MIDAZOLAM SCH MLS/HR: 5 INJECTION, SOLUTION INTRAMUSCULAR; INTRAVENOUS at 14:50

## 2020-06-25 RX ADMIN — MEROPENEM SCH MLS/HR: 1 INJECTION, POWDER, FOR SOLUTION INTRAVENOUS at 22:11

## 2020-06-25 RX ADMIN — MEROPENEM SCH MLS/HR: 1 INJECTION, POWDER, FOR SOLUTION INTRAVENOUS at 10:37

## 2020-06-25 RX ADMIN — NOREPINEPHRINE BITARTRATE SCH MLS/HR: 1 INJECTION, SOLUTION, CONCENTRATE INTRAVENOUS at 14:51

## 2020-06-25 RX ADMIN — PHENYLEPHRINE HYDROCHLORIDE SCH MLS/HR: 10 INJECTION INTRAVENOUS at 17:08

## 2020-06-25 RX ADMIN — PANTOPRAZOLE SODIUM SCH MG: 40 INJECTION, POWDER, FOR SOLUTION INTRAVENOUS at 10:40

## 2020-06-25 RX ADMIN — VANCOMYCIN HYDROCHLORIDE SCH MLS/HR: 1 INJECTION, POWDER, LYOPHILIZED, FOR SOLUTION INTRAVENOUS at 09:12

## 2020-06-25 RX ADMIN — DEXTROSE AND SODIUM CHLORIDE SCH MLS/HR: 5; 450 INJECTION, SOLUTION INTRAVENOUS at 17:07

## 2020-06-25 RX ADMIN — NOREPINEPHRINE BITARTRATE SCH MLS/HR: 1 INJECTION, SOLUTION, CONCENTRATE INTRAVENOUS at 19:22

## 2020-06-25 RX ADMIN — DOPAMINE HYDROCHLORIDE IN DEXTROSE SCH MLS/HR: 1.6 INJECTION, SOLUTION INTRAVENOUS at 00:01

## 2020-06-25 RX ADMIN — PHENYLEPHRINE HYDROCHLORIDE SCH MLS/HR: 10 INJECTION INTRAVENOUS at 01:50

## 2020-06-25 RX ADMIN — DEXTROSE AND SODIUM CHLORIDE SCH MLS/HR: 5; 450 INJECTION, SOLUTION INTRAVENOUS at 06:09

## 2020-06-25 RX ADMIN — MIDAZOLAM SCH MLS/HR: 5 INJECTION, SOLUTION INTRAMUSCULAR; INTRAVENOUS at 19:21

## 2020-06-25 RX ADMIN — PANTOPRAZOLE SODIUM SCH MG: 40 INJECTION, POWDER, FOR SOLUTION INTRAVENOUS at 22:11

## 2020-06-25 RX ADMIN — DOPAMINE HYDROCHLORIDE IN DEXTROSE SCH MLS/HR: 1.6 INJECTION, SOLUTION INTRAVENOUS at 17:07

## 2020-06-25 RX ADMIN — PHENYLEPHRINE HYDROCHLORIDE SCH MLS/HR: 10 INJECTION INTRAVENOUS at 10:08

## 2020-06-25 RX ADMIN — MIDAZOLAM SCH MLS/HR: 5 INJECTION, SOLUTION INTRAMUSCULAR; INTRAVENOUS at 23:43

## 2020-06-25 NOTE — NUR
CONTACT

Called daughters number listed to obtain medical information for admission. No answer, 
message left.

## 2020-06-25 NOTE — NUR
OPEN

ASSUMED CARE OF FEMALE PT ORALLY INTUBATED. PT SEDATED ON VERSED GTT 15 MG/HR. PT GRIMACES 
AND WITHDRAWS TO TACTILE STIMULI. OTHERWISE NON RESPONSIVE. SINUS TACH ON CARDIAC MONITOR. 
LEVOPHED GTT INFUSING AT 24MCG/MIN, DOPAMINE GTT INFUSING AT 10 MCG/KG/MIN. D5 0.45 NS 
INFUSING  ML/HR. PT WITH R. IJ TLC ALL PORTS PATENT. DRESSING CDI. TLC TO R. FEMORAL. 
ALL PORTS PATENT. DRESSING CDI. NGT TO L. NARE CLAMPED. PLACEMENT VERIFIED. ZAYAS TO GRAVITY 
DRAINING CLEAR YELLOW URINE. SHAWN SCD'S IN PLACE. NO SKIN BREAKDOWN OBSERVED. PT WITH 
MALFORMED FINGERS AND THUMBS. HX OF ARTHRITIS. BED IN LOWEST LOCKED POSITION. PILLOWS USED 
TO OFFLOAD BONY PROMINENCES AND SHAWN HEELS. HOB ELEVATED 30 DEGREES. ORAL CARE PROVIDED. PT 
IN FULL VIEW OF RN STATION. WILL CONTINUE TO MONITOR.

## 2020-06-25 NOTE — NUR
ADMIT TO ICU

DVAID MARS admitted to ICU via gurney on cardiac monitor, intubated and being bagged 
by Respiratory Therapist Deloris. Patient transferred to bed 108, connected to mechanical 
ventilator by therapist, KATLYN at bedside. Patient connected to ICU monitoring, weighed by 
bedscale, oriented to Tracy Glynn primary RN, unit, ventilator and sedation (Versed). 
Patient on vasopressor therapy (Levophed and Dopamine). IVF fluid running into right IJ TLC, 
patient also has Right Femoral TLC with CVP monitoring, 5. Physical assessment completed. 
Partial linen change complete and patient repositioned on side. Tolerated activity fair. 
Patient opening eyes to stimuli. No distress noted. Temperature 100.4 rectal. Cooling 
measures initiated. Bed locked in lowest position, alarms in place. Will continue to 
monitor.

## 2020-06-26 VITALS — SYSTOLIC BLOOD PRESSURE: 106 MMHG | DIASTOLIC BLOOD PRESSURE: 63 MMHG

## 2020-06-26 VITALS — DIASTOLIC BLOOD PRESSURE: 60 MMHG | SYSTOLIC BLOOD PRESSURE: 106 MMHG

## 2020-06-26 VITALS — DIASTOLIC BLOOD PRESSURE: 56 MMHG | SYSTOLIC BLOOD PRESSURE: 102 MMHG

## 2020-06-26 VITALS — SYSTOLIC BLOOD PRESSURE: 104 MMHG | DIASTOLIC BLOOD PRESSURE: 59 MMHG

## 2020-06-26 VITALS — DIASTOLIC BLOOD PRESSURE: 64 MMHG | SYSTOLIC BLOOD PRESSURE: 125 MMHG

## 2020-06-26 VITALS — DIASTOLIC BLOOD PRESSURE: 57 MMHG | SYSTOLIC BLOOD PRESSURE: 103 MMHG

## 2020-06-26 VITALS — DIASTOLIC BLOOD PRESSURE: 60 MMHG | SYSTOLIC BLOOD PRESSURE: 102 MMHG

## 2020-06-26 VITALS — DIASTOLIC BLOOD PRESSURE: 57 MMHG | SYSTOLIC BLOOD PRESSURE: 99 MMHG

## 2020-06-26 VITALS — SYSTOLIC BLOOD PRESSURE: 102 MMHG | DIASTOLIC BLOOD PRESSURE: 57 MMHG

## 2020-06-26 VITALS — DIASTOLIC BLOOD PRESSURE: 57 MMHG | SYSTOLIC BLOOD PRESSURE: 101 MMHG

## 2020-06-26 VITALS — DIASTOLIC BLOOD PRESSURE: 51 MMHG | SYSTOLIC BLOOD PRESSURE: 102 MMHG

## 2020-06-26 VITALS — SYSTOLIC BLOOD PRESSURE: 106 MMHG | DIASTOLIC BLOOD PRESSURE: 59 MMHG

## 2020-06-26 VITALS — SYSTOLIC BLOOD PRESSURE: 104 MMHG | DIASTOLIC BLOOD PRESSURE: 46 MMHG

## 2020-06-26 VITALS — DIASTOLIC BLOOD PRESSURE: 54 MMHG | SYSTOLIC BLOOD PRESSURE: 98 MMHG

## 2020-06-26 VITALS — SYSTOLIC BLOOD PRESSURE: 90 MMHG | DIASTOLIC BLOOD PRESSURE: 47 MMHG

## 2020-06-26 VITALS — SYSTOLIC BLOOD PRESSURE: 110 MMHG | DIASTOLIC BLOOD PRESSURE: 61 MMHG

## 2020-06-26 VITALS — DIASTOLIC BLOOD PRESSURE: 59 MMHG | SYSTOLIC BLOOD PRESSURE: 103 MMHG

## 2020-06-26 VITALS — DIASTOLIC BLOOD PRESSURE: 54 MMHG | SYSTOLIC BLOOD PRESSURE: 96 MMHG

## 2020-06-26 VITALS — DIASTOLIC BLOOD PRESSURE: 54 MMHG | SYSTOLIC BLOOD PRESSURE: 86 MMHG

## 2020-06-26 VITALS — DIASTOLIC BLOOD PRESSURE: 54 MMHG | SYSTOLIC BLOOD PRESSURE: 93 MMHG

## 2020-06-26 VITALS — SYSTOLIC BLOOD PRESSURE: 108 MMHG | DIASTOLIC BLOOD PRESSURE: 58 MMHG

## 2020-06-26 VITALS — SYSTOLIC BLOOD PRESSURE: 102 MMHG | DIASTOLIC BLOOD PRESSURE: 58 MMHG

## 2020-06-26 VITALS — SYSTOLIC BLOOD PRESSURE: 96 MMHG | DIASTOLIC BLOOD PRESSURE: 52 MMHG

## 2020-06-26 VITALS — SYSTOLIC BLOOD PRESSURE: 102 MMHG | DIASTOLIC BLOOD PRESSURE: 63 MMHG

## 2020-06-26 VITALS — SYSTOLIC BLOOD PRESSURE: 92 MMHG | DIASTOLIC BLOOD PRESSURE: 50 MMHG

## 2020-06-26 VITALS — DIASTOLIC BLOOD PRESSURE: 59 MMHG | SYSTOLIC BLOOD PRESSURE: 105 MMHG

## 2020-06-26 VITALS — DIASTOLIC BLOOD PRESSURE: 56 MMHG | SYSTOLIC BLOOD PRESSURE: 98 MMHG

## 2020-06-26 VITALS — DIASTOLIC BLOOD PRESSURE: 54 MMHG | SYSTOLIC BLOOD PRESSURE: 97 MMHG

## 2020-06-26 VITALS — SYSTOLIC BLOOD PRESSURE: 104 MMHG | DIASTOLIC BLOOD PRESSURE: 50 MMHG

## 2020-06-26 VITALS — DIASTOLIC BLOOD PRESSURE: 51 MMHG | SYSTOLIC BLOOD PRESSURE: 96 MMHG

## 2020-06-26 VITALS — DIASTOLIC BLOOD PRESSURE: 48 MMHG | SYSTOLIC BLOOD PRESSURE: 91 MMHG

## 2020-06-26 VITALS — DIASTOLIC BLOOD PRESSURE: 53 MMHG | SYSTOLIC BLOOD PRESSURE: 99 MMHG

## 2020-06-26 VITALS — SYSTOLIC BLOOD PRESSURE: 101 MMHG | DIASTOLIC BLOOD PRESSURE: 55 MMHG

## 2020-06-26 VITALS — SYSTOLIC BLOOD PRESSURE: 132 MMHG | DIASTOLIC BLOOD PRESSURE: 54 MMHG

## 2020-06-26 VITALS — DIASTOLIC BLOOD PRESSURE: 59 MMHG | SYSTOLIC BLOOD PRESSURE: 98 MMHG

## 2020-06-26 VITALS — SYSTOLIC BLOOD PRESSURE: 97 MMHG | DIASTOLIC BLOOD PRESSURE: 50 MMHG

## 2020-06-26 VITALS — SYSTOLIC BLOOD PRESSURE: 95 MMHG | DIASTOLIC BLOOD PRESSURE: 53 MMHG

## 2020-06-26 VITALS — SYSTOLIC BLOOD PRESSURE: 97 MMHG | DIASTOLIC BLOOD PRESSURE: 54 MMHG

## 2020-06-26 VITALS — SYSTOLIC BLOOD PRESSURE: 103 MMHG | DIASTOLIC BLOOD PRESSURE: 58 MMHG

## 2020-06-26 VITALS — DIASTOLIC BLOOD PRESSURE: 53 MMHG | SYSTOLIC BLOOD PRESSURE: 98 MMHG

## 2020-06-26 VITALS — SYSTOLIC BLOOD PRESSURE: 102 MMHG | DIASTOLIC BLOOD PRESSURE: 52 MMHG

## 2020-06-26 VITALS — DIASTOLIC BLOOD PRESSURE: 61 MMHG | SYSTOLIC BLOOD PRESSURE: 107 MMHG

## 2020-06-26 VITALS — SYSTOLIC BLOOD PRESSURE: 98 MMHG | DIASTOLIC BLOOD PRESSURE: 55 MMHG

## 2020-06-26 VITALS — DIASTOLIC BLOOD PRESSURE: 51 MMHG | SYSTOLIC BLOOD PRESSURE: 92 MMHG

## 2020-06-26 VITALS — SYSTOLIC BLOOD PRESSURE: 87 MMHG | DIASTOLIC BLOOD PRESSURE: 50 MMHG

## 2020-06-26 VITALS — DIASTOLIC BLOOD PRESSURE: 47 MMHG | SYSTOLIC BLOOD PRESSURE: 93 MMHG

## 2020-06-26 VITALS — DIASTOLIC BLOOD PRESSURE: 78 MMHG | SYSTOLIC BLOOD PRESSURE: 131 MMHG

## 2020-06-26 VITALS — SYSTOLIC BLOOD PRESSURE: 105 MMHG | DIASTOLIC BLOOD PRESSURE: 56 MMHG

## 2020-06-26 VITALS — SYSTOLIC BLOOD PRESSURE: 104 MMHG | DIASTOLIC BLOOD PRESSURE: 61 MMHG

## 2020-06-26 VITALS — DIASTOLIC BLOOD PRESSURE: 56 MMHG | SYSTOLIC BLOOD PRESSURE: 100 MMHG

## 2020-06-26 VITALS — SYSTOLIC BLOOD PRESSURE: 132 MMHG | DIASTOLIC BLOOD PRESSURE: 80 MMHG

## 2020-06-26 VITALS — DIASTOLIC BLOOD PRESSURE: 55 MMHG | SYSTOLIC BLOOD PRESSURE: 85 MMHG

## 2020-06-26 VITALS — DIASTOLIC BLOOD PRESSURE: 51 MMHG | SYSTOLIC BLOOD PRESSURE: 93 MMHG

## 2020-06-26 VITALS — DIASTOLIC BLOOD PRESSURE: 59 MMHG | SYSTOLIC BLOOD PRESSURE: 109 MMHG

## 2020-06-26 VITALS — DIASTOLIC BLOOD PRESSURE: 58 MMHG | SYSTOLIC BLOOD PRESSURE: 106 MMHG

## 2020-06-26 VITALS — DIASTOLIC BLOOD PRESSURE: 50 MMHG | SYSTOLIC BLOOD PRESSURE: 95 MMHG

## 2020-06-26 VITALS — SYSTOLIC BLOOD PRESSURE: 106 MMHG | DIASTOLIC BLOOD PRESSURE: 61 MMHG

## 2020-06-26 VITALS — SYSTOLIC BLOOD PRESSURE: 90 MMHG | DIASTOLIC BLOOD PRESSURE: 51 MMHG

## 2020-06-26 VITALS — SYSTOLIC BLOOD PRESSURE: 102 MMHG | DIASTOLIC BLOOD PRESSURE: 56 MMHG

## 2020-06-26 VITALS — SYSTOLIC BLOOD PRESSURE: 100 MMHG | DIASTOLIC BLOOD PRESSURE: 60 MMHG

## 2020-06-26 VITALS — DIASTOLIC BLOOD PRESSURE: 51 MMHG | SYSTOLIC BLOOD PRESSURE: 101 MMHG

## 2020-06-26 VITALS — SYSTOLIC BLOOD PRESSURE: 85 MMHG | DIASTOLIC BLOOD PRESSURE: 56 MMHG

## 2020-06-26 VITALS — DIASTOLIC BLOOD PRESSURE: 54 MMHG | SYSTOLIC BLOOD PRESSURE: 94 MMHG

## 2020-06-26 VITALS — SYSTOLIC BLOOD PRESSURE: 91 MMHG | DIASTOLIC BLOOD PRESSURE: 48 MMHG

## 2020-06-26 VITALS — DIASTOLIC BLOOD PRESSURE: 77 MMHG | SYSTOLIC BLOOD PRESSURE: 132 MMHG

## 2020-06-26 VITALS — SYSTOLIC BLOOD PRESSURE: 112 MMHG | DIASTOLIC BLOOD PRESSURE: 63 MMHG

## 2020-06-26 VITALS — DIASTOLIC BLOOD PRESSURE: 55 MMHG | SYSTOLIC BLOOD PRESSURE: 98 MMHG

## 2020-06-26 VITALS — SYSTOLIC BLOOD PRESSURE: 104 MMHG | DIASTOLIC BLOOD PRESSURE: 56 MMHG

## 2020-06-26 VITALS — SYSTOLIC BLOOD PRESSURE: 101 MMHG | DIASTOLIC BLOOD PRESSURE: 59 MMHG

## 2020-06-26 VITALS — SYSTOLIC BLOOD PRESSURE: 89 MMHG | DIASTOLIC BLOOD PRESSURE: 59 MMHG

## 2020-06-26 VITALS — DIASTOLIC BLOOD PRESSURE: 50 MMHG | SYSTOLIC BLOOD PRESSURE: 92 MMHG

## 2020-06-26 VITALS — SYSTOLIC BLOOD PRESSURE: 92 MMHG | DIASTOLIC BLOOD PRESSURE: 53 MMHG

## 2020-06-26 VITALS — DIASTOLIC BLOOD PRESSURE: 52 MMHG | SYSTOLIC BLOOD PRESSURE: 95 MMHG

## 2020-06-26 VITALS — SYSTOLIC BLOOD PRESSURE: 96 MMHG | DIASTOLIC BLOOD PRESSURE: 53 MMHG

## 2020-06-26 VITALS — SYSTOLIC BLOOD PRESSURE: 103 MMHG | DIASTOLIC BLOOD PRESSURE: 60 MMHG

## 2020-06-26 VITALS — SYSTOLIC BLOOD PRESSURE: 126 MMHG | DIASTOLIC BLOOD PRESSURE: 70 MMHG

## 2020-06-26 VITALS — DIASTOLIC BLOOD PRESSURE: 50 MMHG | SYSTOLIC BLOOD PRESSURE: 104 MMHG

## 2020-06-26 VITALS — SYSTOLIC BLOOD PRESSURE: 97 MMHG | DIASTOLIC BLOOD PRESSURE: 59 MMHG

## 2020-06-26 VITALS — SYSTOLIC BLOOD PRESSURE: 95 MMHG | DIASTOLIC BLOOD PRESSURE: 52 MMHG

## 2020-06-26 VITALS — SYSTOLIC BLOOD PRESSURE: 98 MMHG | DIASTOLIC BLOOD PRESSURE: 56 MMHG

## 2020-06-26 VITALS — SYSTOLIC BLOOD PRESSURE: 85 MMHG | DIASTOLIC BLOOD PRESSURE: 55 MMHG

## 2020-06-26 VITALS — DIASTOLIC BLOOD PRESSURE: 35 MMHG | SYSTOLIC BLOOD PRESSURE: 63 MMHG

## 2020-06-26 VITALS — SYSTOLIC BLOOD PRESSURE: 105 MMHG | DIASTOLIC BLOOD PRESSURE: 55 MMHG

## 2020-06-26 VITALS — DIASTOLIC BLOOD PRESSURE: 49 MMHG | SYSTOLIC BLOOD PRESSURE: 93 MMHG

## 2020-06-26 VITALS — SYSTOLIC BLOOD PRESSURE: 98 MMHG | DIASTOLIC BLOOD PRESSURE: 54 MMHG

## 2020-06-26 VITALS — SYSTOLIC BLOOD PRESSURE: 103 MMHG

## 2020-06-26 VITALS — DIASTOLIC BLOOD PRESSURE: 50 MMHG | SYSTOLIC BLOOD PRESSURE: 97 MMHG

## 2020-06-26 VITALS — SYSTOLIC BLOOD PRESSURE: 101 MMHG | DIASTOLIC BLOOD PRESSURE: 56 MMHG

## 2020-06-26 VITALS — DIASTOLIC BLOOD PRESSURE: 56 MMHG | SYSTOLIC BLOOD PRESSURE: 103 MMHG

## 2020-06-26 VITALS — DIASTOLIC BLOOD PRESSURE: 55 MMHG | SYSTOLIC BLOOD PRESSURE: 94 MMHG

## 2020-06-26 VITALS — DIASTOLIC BLOOD PRESSURE: 49 MMHG | SYSTOLIC BLOOD PRESSURE: 87 MMHG

## 2020-06-26 VITALS — DIASTOLIC BLOOD PRESSURE: 58 MMHG | SYSTOLIC BLOOD PRESSURE: 100 MMHG

## 2020-06-26 VITALS — SYSTOLIC BLOOD PRESSURE: 97 MMHG | DIASTOLIC BLOOD PRESSURE: 53 MMHG

## 2020-06-26 LAB
ANION GAP SERPL CALCULATED.3IONS-SCNC: 7 MMOL/L (ref 5–15)
BUN SERPL-MCNC: 11 MG/DL (ref 7–18)
BUN/CREAT SERPL: 18.3
CALCIUM SERPL-MCNC: 6.8 MG/DL (ref 8.5–10.1)
CHLORIDE SERPL-SCNC: 110 MMOL/L (ref 98–107)
CO2 SERPL-SCNC: 26 MMOL/L (ref 21–32)
GLUCOSE SERPL-MCNC: 160 MG/DL (ref 74–106)
HCT VFR BLD AUTO: 34.4 % (ref 36–46)
HGB BLD-MCNC: 11.3 G/DL (ref 12.2–16.2)
LACTATE PLASV-SCNC: 2.5 MMOL/L (ref 0.4–2)
MAGNESIUM SERPL-MCNC: 2.1 MG/DL (ref 1.6–2.6)
MCH RBC QN AUTO: 26.1 PG (ref 28–32)
MCV RBC AUTO: 79.5 FL (ref 80–100)
NRBC BLD QL AUTO: 0 %
POTASSIUM SERPL-SCNC: 3.3 MMOL/L (ref 3.5–5.1)
POTASSIUM SERPL-SCNC: 3.9 MMOL/L (ref 3.5–5.1)
SODIUM SERPL-SCNC: 143 MMOL/L (ref 136–145)

## 2020-06-26 RX ADMIN — MUPIROCIN SCH APPLIC: 20 OINTMENT TOPICAL at 21:59

## 2020-06-26 RX ADMIN — MIDAZOLAM SCH MLS/HR: 5 INJECTION, SOLUTION INTRAMUSCULAR; INTRAVENOUS at 13:56

## 2020-06-26 RX ADMIN — MIDAZOLAM SCH MLS/HR: 5 INJECTION, SOLUTION INTRAMUSCULAR; INTRAVENOUS at 23:34

## 2020-06-26 RX ADMIN — NOREPINEPHRINE BITARTRATE SCH MLS/HR: 1 INJECTION, SOLUTION, CONCENTRATE INTRAVENOUS at 18:30

## 2020-06-26 RX ADMIN — PHENYLEPHRINE HYDROCHLORIDE SCH MLS/HR: 10 INJECTION INTRAVENOUS at 15:31

## 2020-06-26 RX ADMIN — MAGNESIUM SULFATE IN DEXTROSE SCH MLS/HR: 10 INJECTION, SOLUTION INTRAVENOUS at 08:44

## 2020-06-26 RX ADMIN — FENTANYL CITRATE SCH MLS/HR: 50 INJECTION, SOLUTION INTRAMUSCULAR; INTRAVENOUS at 15:00

## 2020-06-26 RX ADMIN — MEROPENEM SCH MLS/HR: 1 INJECTION, POWDER, FOR SOLUTION INTRAVENOUS at 10:37

## 2020-06-26 RX ADMIN — PHENYLEPHRINE HYDROCHLORIDE SCH MLS/HR: 10 INJECTION INTRAVENOUS at 04:00

## 2020-06-26 RX ADMIN — NOREPINEPHRINE BITARTRATE SCH MLS/HR: 1 INJECTION, SOLUTION, CONCENTRATE INTRAVENOUS at 08:36

## 2020-06-26 RX ADMIN — MEROPENEM SCH MLS/HR: 1 INJECTION, POWDER, FOR SOLUTION INTRAVENOUS at 21:59

## 2020-06-26 RX ADMIN — MIDAZOLAM SCH MLS/HR: 5 INJECTION, SOLUTION INTRAMUSCULAR; INTRAVENOUS at 04:15

## 2020-06-26 RX ADMIN — PHENYLEPHRINE HYDROCHLORIDE SCH MLS/HR: 10 INJECTION INTRAVENOUS at 23:31

## 2020-06-26 RX ADMIN — PHENYLEPHRINE HYDROCHLORIDE SCH MLS/HR: 10 INJECTION INTRAVENOUS at 09:50

## 2020-06-26 RX ADMIN — VANCOMYCIN HYDROCHLORIDE SCH MLS/HR: 1 INJECTION, POWDER, LYOPHILIZED, FOR SOLUTION INTRAVENOUS at 09:28

## 2020-06-26 RX ADMIN — PANTOPRAZOLE SODIUM SCH MG: 40 INJECTION, POWDER, FOR SOLUTION INTRAVENOUS at 10:38

## 2020-06-26 RX ADMIN — DOPAMINE HYDROCHLORIDE IN DEXTROSE SCH MLS/HR: 1.6 INJECTION, SOLUTION INTRAVENOUS at 07:40

## 2020-06-26 RX ADMIN — POTASSIUM CHLORIDE SCH MLS/HR: 200 INJECTION, SOLUTION INTRAVENOUS at 09:50

## 2020-06-26 RX ADMIN — DEXTROSE AND SODIUM CHLORIDE SCH MLS/HR: 5; 450 INJECTION, SOLUTION INTRAVENOUS at 18:23

## 2020-06-26 RX ADMIN — MAGNESIUM SULFATE IN DEXTROSE SCH MLS/HR: 10 INJECTION, SOLUTION INTRAVENOUS at 10:38

## 2020-06-26 RX ADMIN — POTASSIUM CHLORIDE SCH MLS/HR: 200 INJECTION, SOLUTION INTRAVENOUS at 07:55

## 2020-06-26 RX ADMIN — PANTOPRAZOLE SODIUM SCH MG: 40 INJECTION, POWDER, FOR SOLUTION INTRAVENOUS at 21:59

## 2020-06-26 RX ADMIN — MIDAZOLAM SCH MLS/HR: 5 INJECTION, SOLUTION INTRAMUSCULAR; INTRAVENOUS at 09:51

## 2020-06-26 RX ADMIN — DEXTROSE AND SODIUM CHLORIDE SCH MLS/HR: 5; 450 INJECTION, SOLUTION INTRAVENOUS at 03:20

## 2020-06-26 RX ADMIN — DEXTROSE AND SODIUM CHLORIDE SCH MLS/HR: 5; 450 INJECTION, SOLUTION INTRAVENOUS at 13:56

## 2020-06-26 NOTE — NUR
ORDERS

 wants to place patient on potassium and magnesium protocol. If potassium is less 
than 3.8, administer potassium rider 20 meq times two (40 meq total). If magnesium less than 
2, administer two grams of magnesium rider. Orders placed.

## 2020-06-26 NOTE — NUR
PEN

ASSUMED CARE OF FEMALE PT ORALLY INTUBATED. PT SEDATED ON VERSED GTT 10 MG/HR AND FENTANYL 
GTT 50 MCG/HR. PT OPENS EYES DURING TURNING WITHDRAWS TO TACTILE STIMULI. OTHERWISE NON 
RESPONSIVE. SINUS RHYTHM ON CARDIAC MONITOR. LEVOPHED GTT INFUSING AT 14MCG/MIN, 
PHENYLEPHRINE GTT AT 40 MCG MIN. D5 0.45 NS INFUSING AT 50ML/HR. PT WITH R. IJ TLC ALL PORTS 
PATENT. DRESSING CDI. TLC TO R. FEMORAL. ALL PORTS PATENT. DRESSING CDI. NGT TO L. NARE 
CLAMPED. PLACEMENT VERIFIED. ZAYAS TO GRAVITY DRAINING CLEAR YELLOW URINE. SHAWN SCD'S IN 
PLACE. CIRCULAR SLOW TO TRISTA AREA OF REDNESS OBSERVED TO L. HEEL. PT WITH MALFORMED 
FINGERS AND THUMBS. HX OF ARTHRITIS. BED IN LOWEST LOCKED POSITION. PILLOWS USED TO OFFLOAD 
BONY PROMINENCES AND SHAWN HEELS. HOB ELEVATED 30 DEGREES. ORAL CARE PROVIDED. PT IN FULL VIEW 
OF RN STATION. WILL CONTINUE TO MONITOR.

## 2020-06-26 NOTE — NUR
Nutrition Assessment Notes



please see attached link for complete assessment



Est. Energy Needs ABW 62 [id number] kcal (23-25 kcal/kg BW), Est. Protein Needs:  62-74 
gms/day (1.0-1.2 gms/kg ABW r/t hypoalb).Will continue to monitor and reassess prn.


-------------------------------------------------------------------------------

Addendum: 20 at 1211 by Fariha Drew RD

-------------------------------------------------------------------------------

Amended: Links added.

## 2020-06-26 NOTE — NUR
assessment

Patient is a 79 year old female who is on a vent in ICU. Per patients daughter Jessika prior 
to admission patient lived home with her son and functioned with assistance of her SS 
caregiver. Patients son helps her at night. Patient has a fww for home use. Patients PCP is 
Dr Andrews Per Jessika patient was vomiting and family called 911. Patient was brought to ER and 
admitted. Patient has no POA or advanced directive. I informed Jessika that patients post 
discharge needs to be determined after extubation and prior to discharge. Jessika verbalized 
understanding. I Will continue to monitor and follow up as appropriate.

-------------------------------------------------------------------------------

Addendum: 06/26/20 at 1350 by Enedina RUSSO

-------------------------------------------------------------------------------

Amended: Links added.

## 2020-06-26 NOTE — NUR
SEDATION VACATION

Patient awakes on current sedation when moved or any tactile stimuli applied. Patient not 
ready to wean at this time.  aware.

## 2020-06-26 NOTE — NUR
MD PAGED

ROCK Fuller paged regarding Microbiology results. Paging services stated he will not be 
available until after [phone].

## 2020-06-27 VITALS — DIASTOLIC BLOOD PRESSURE: 55 MMHG | SYSTOLIC BLOOD PRESSURE: 107 MMHG

## 2020-06-27 VITALS — SYSTOLIC BLOOD PRESSURE: 94 MMHG | DIASTOLIC BLOOD PRESSURE: 49 MMHG

## 2020-06-27 VITALS — DIASTOLIC BLOOD PRESSURE: 52 MMHG | SYSTOLIC BLOOD PRESSURE: 109 MMHG

## 2020-06-27 VITALS — SYSTOLIC BLOOD PRESSURE: 111 MMHG | DIASTOLIC BLOOD PRESSURE: 59 MMHG

## 2020-06-27 VITALS — DIASTOLIC BLOOD PRESSURE: 53 MMHG | SYSTOLIC BLOOD PRESSURE: 102 MMHG

## 2020-06-27 VITALS — DIASTOLIC BLOOD PRESSURE: 51 MMHG | SYSTOLIC BLOOD PRESSURE: 107 MMHG

## 2020-06-27 VITALS — DIASTOLIC BLOOD PRESSURE: 59 MMHG | SYSTOLIC BLOOD PRESSURE: 105 MMHG

## 2020-06-27 VITALS — SYSTOLIC BLOOD PRESSURE: 104 MMHG | DIASTOLIC BLOOD PRESSURE: 59 MMHG

## 2020-06-27 VITALS — SYSTOLIC BLOOD PRESSURE: 100 MMHG | DIASTOLIC BLOOD PRESSURE: 54 MMHG

## 2020-06-27 VITALS — SYSTOLIC BLOOD PRESSURE: 98 MMHG | DIASTOLIC BLOOD PRESSURE: 50 MMHG

## 2020-06-27 VITALS — DIASTOLIC BLOOD PRESSURE: 45 MMHG | SYSTOLIC BLOOD PRESSURE: 108 MMHG

## 2020-06-27 VITALS — DIASTOLIC BLOOD PRESSURE: 51 MMHG | SYSTOLIC BLOOD PRESSURE: 106 MMHG

## 2020-06-27 VITALS — DIASTOLIC BLOOD PRESSURE: 50 MMHG | SYSTOLIC BLOOD PRESSURE: 105 MMHG

## 2020-06-27 VITALS — DIASTOLIC BLOOD PRESSURE: 58 MMHG | SYSTOLIC BLOOD PRESSURE: 94 MMHG

## 2020-06-27 VITALS — SYSTOLIC BLOOD PRESSURE: 108 MMHG | DIASTOLIC BLOOD PRESSURE: 57 MMHG

## 2020-06-27 VITALS — SYSTOLIC BLOOD PRESSURE: 87 MMHG | DIASTOLIC BLOOD PRESSURE: 46 MMHG

## 2020-06-27 VITALS — SYSTOLIC BLOOD PRESSURE: 90 MMHG | DIASTOLIC BLOOD PRESSURE: 49 MMHG

## 2020-06-27 VITALS — SYSTOLIC BLOOD PRESSURE: 104 MMHG | DIASTOLIC BLOOD PRESSURE: 56 MMHG

## 2020-06-27 VITALS — DIASTOLIC BLOOD PRESSURE: 53 MMHG | SYSTOLIC BLOOD PRESSURE: 99 MMHG

## 2020-06-27 VITALS — SYSTOLIC BLOOD PRESSURE: 105 MMHG | DIASTOLIC BLOOD PRESSURE: 53 MMHG

## 2020-06-27 VITALS — SYSTOLIC BLOOD PRESSURE: 103 MMHG | DIASTOLIC BLOOD PRESSURE: 54 MMHG

## 2020-06-27 VITALS — SYSTOLIC BLOOD PRESSURE: 112 MMHG | DIASTOLIC BLOOD PRESSURE: 47 MMHG

## 2020-06-27 VITALS — SYSTOLIC BLOOD PRESSURE: 104 MMHG | DIASTOLIC BLOOD PRESSURE: 55 MMHG

## 2020-06-27 VITALS — DIASTOLIC BLOOD PRESSURE: 50 MMHG | SYSTOLIC BLOOD PRESSURE: 107 MMHG

## 2020-06-27 VITALS — DIASTOLIC BLOOD PRESSURE: 55 MMHG | SYSTOLIC BLOOD PRESSURE: 102 MMHG

## 2020-06-27 VITALS — SYSTOLIC BLOOD PRESSURE: 105 MMHG | DIASTOLIC BLOOD PRESSURE: 54 MMHG

## 2020-06-27 VITALS — SYSTOLIC BLOOD PRESSURE: 108 MMHG | DIASTOLIC BLOOD PRESSURE: 58 MMHG

## 2020-06-27 VITALS — SYSTOLIC BLOOD PRESSURE: 91 MMHG | DIASTOLIC BLOOD PRESSURE: 47 MMHG

## 2020-06-27 VITALS — SYSTOLIC BLOOD PRESSURE: 83 MMHG | DIASTOLIC BLOOD PRESSURE: 42 MMHG

## 2020-06-27 VITALS — DIASTOLIC BLOOD PRESSURE: 50 MMHG | SYSTOLIC BLOOD PRESSURE: 110 MMHG

## 2020-06-27 VITALS — DIASTOLIC BLOOD PRESSURE: 55 MMHG | SYSTOLIC BLOOD PRESSURE: 104 MMHG

## 2020-06-27 VITALS — DIASTOLIC BLOOD PRESSURE: 50 MMHG | SYSTOLIC BLOOD PRESSURE: 95 MMHG

## 2020-06-27 VITALS — DIASTOLIC BLOOD PRESSURE: 48 MMHG | SYSTOLIC BLOOD PRESSURE: 104 MMHG

## 2020-06-27 VITALS — DIASTOLIC BLOOD PRESSURE: 52 MMHG | SYSTOLIC BLOOD PRESSURE: 98 MMHG

## 2020-06-27 VITALS — DIASTOLIC BLOOD PRESSURE: 54 MMHG | SYSTOLIC BLOOD PRESSURE: 111 MMHG

## 2020-06-27 VITALS — SYSTOLIC BLOOD PRESSURE: 98 MMHG | DIASTOLIC BLOOD PRESSURE: 53 MMHG

## 2020-06-27 VITALS — DIASTOLIC BLOOD PRESSURE: 52 MMHG | SYSTOLIC BLOOD PRESSURE: 97 MMHG

## 2020-06-27 VITALS — SYSTOLIC BLOOD PRESSURE: 88 MMHG | DIASTOLIC BLOOD PRESSURE: 46 MMHG

## 2020-06-27 VITALS — SYSTOLIC BLOOD PRESSURE: 96 MMHG | DIASTOLIC BLOOD PRESSURE: 52 MMHG

## 2020-06-27 VITALS — DIASTOLIC BLOOD PRESSURE: 47 MMHG | SYSTOLIC BLOOD PRESSURE: 105 MMHG

## 2020-06-27 VITALS — SYSTOLIC BLOOD PRESSURE: 100 MMHG | DIASTOLIC BLOOD PRESSURE: 53 MMHG

## 2020-06-27 VITALS — DIASTOLIC BLOOD PRESSURE: 53 MMHG | SYSTOLIC BLOOD PRESSURE: 103 MMHG

## 2020-06-27 VITALS — DIASTOLIC BLOOD PRESSURE: 50 MMHG | SYSTOLIC BLOOD PRESSURE: 104 MMHG

## 2020-06-27 VITALS — DIASTOLIC BLOOD PRESSURE: 48 MMHG | SYSTOLIC BLOOD PRESSURE: 107 MMHG

## 2020-06-27 VITALS — SYSTOLIC BLOOD PRESSURE: 106 MMHG | DIASTOLIC BLOOD PRESSURE: 50 MMHG

## 2020-06-27 VITALS — SYSTOLIC BLOOD PRESSURE: 108 MMHG | DIASTOLIC BLOOD PRESSURE: 47 MMHG

## 2020-06-27 VITALS — SYSTOLIC BLOOD PRESSURE: 104 MMHG | DIASTOLIC BLOOD PRESSURE: 48 MMHG

## 2020-06-27 VITALS — SYSTOLIC BLOOD PRESSURE: 99 MMHG | DIASTOLIC BLOOD PRESSURE: 53 MMHG

## 2020-06-27 VITALS — DIASTOLIC BLOOD PRESSURE: 55 MMHG | SYSTOLIC BLOOD PRESSURE: 100 MMHG

## 2020-06-27 VITALS — DIASTOLIC BLOOD PRESSURE: 56 MMHG | SYSTOLIC BLOOD PRESSURE: 102 MMHG

## 2020-06-27 VITALS — DIASTOLIC BLOOD PRESSURE: 54 MMHG | SYSTOLIC BLOOD PRESSURE: 101 MMHG

## 2020-06-27 VITALS — DIASTOLIC BLOOD PRESSURE: 50 MMHG | SYSTOLIC BLOOD PRESSURE: 109 MMHG

## 2020-06-27 VITALS — DIASTOLIC BLOOD PRESSURE: 50 MMHG | SYSTOLIC BLOOD PRESSURE: 89 MMHG

## 2020-06-27 VITALS — DIASTOLIC BLOOD PRESSURE: 54 MMHG | SYSTOLIC BLOOD PRESSURE: 104 MMHG

## 2020-06-27 VITALS — SYSTOLIC BLOOD PRESSURE: 97 MMHG | DIASTOLIC BLOOD PRESSURE: 53 MMHG

## 2020-06-27 VITALS — DIASTOLIC BLOOD PRESSURE: 56 MMHG | SYSTOLIC BLOOD PRESSURE: 104 MMHG

## 2020-06-27 VITALS — DIASTOLIC BLOOD PRESSURE: 51 MMHG | SYSTOLIC BLOOD PRESSURE: 112 MMHG

## 2020-06-27 VITALS — SYSTOLIC BLOOD PRESSURE: 95 MMHG | DIASTOLIC BLOOD PRESSURE: 57 MMHG

## 2020-06-27 VITALS — SYSTOLIC BLOOD PRESSURE: 108 MMHG | DIASTOLIC BLOOD PRESSURE: 50 MMHG

## 2020-06-27 VITALS — DIASTOLIC BLOOD PRESSURE: 53 MMHG | SYSTOLIC BLOOD PRESSURE: 109 MMHG

## 2020-06-27 VITALS — SYSTOLIC BLOOD PRESSURE: 86 MMHG | DIASTOLIC BLOOD PRESSURE: 42 MMHG

## 2020-06-27 VITALS — SYSTOLIC BLOOD PRESSURE: 113 MMHG | DIASTOLIC BLOOD PRESSURE: 49 MMHG

## 2020-06-27 VITALS — SYSTOLIC BLOOD PRESSURE: 107 MMHG | DIASTOLIC BLOOD PRESSURE: 56 MMHG

## 2020-06-27 VITALS — DIASTOLIC BLOOD PRESSURE: 50 MMHG | SYSTOLIC BLOOD PRESSURE: 94 MMHG

## 2020-06-27 VITALS — DIASTOLIC BLOOD PRESSURE: 48 MMHG | SYSTOLIC BLOOD PRESSURE: 114 MMHG

## 2020-06-27 VITALS — DIASTOLIC BLOOD PRESSURE: 50 MMHG | SYSTOLIC BLOOD PRESSURE: 108 MMHG

## 2020-06-27 VITALS — DIASTOLIC BLOOD PRESSURE: 52 MMHG | SYSTOLIC BLOOD PRESSURE: 100 MMHG

## 2020-06-27 VITALS — SYSTOLIC BLOOD PRESSURE: 82 MMHG | DIASTOLIC BLOOD PRESSURE: 42 MMHG

## 2020-06-27 VITALS — DIASTOLIC BLOOD PRESSURE: 53 MMHG | SYSTOLIC BLOOD PRESSURE: 110 MMHG

## 2020-06-27 VITALS — SYSTOLIC BLOOD PRESSURE: 95 MMHG | DIASTOLIC BLOOD PRESSURE: 48 MMHG

## 2020-06-27 VITALS — SYSTOLIC BLOOD PRESSURE: 104 MMHG | DIASTOLIC BLOOD PRESSURE: 53 MMHG

## 2020-06-27 VITALS — DIASTOLIC BLOOD PRESSURE: 48 MMHG | SYSTOLIC BLOOD PRESSURE: 105 MMHG

## 2020-06-27 VITALS — DIASTOLIC BLOOD PRESSURE: 48 MMHG | SYSTOLIC BLOOD PRESSURE: 93 MMHG

## 2020-06-27 VITALS — DIASTOLIC BLOOD PRESSURE: 56 MMHG | SYSTOLIC BLOOD PRESSURE: 105 MMHG

## 2020-06-27 VITALS — SYSTOLIC BLOOD PRESSURE: 98 MMHG | DIASTOLIC BLOOD PRESSURE: 51 MMHG

## 2020-06-27 VITALS — SYSTOLIC BLOOD PRESSURE: 103 MMHG | DIASTOLIC BLOOD PRESSURE: 53 MMHG

## 2020-06-27 VITALS — SYSTOLIC BLOOD PRESSURE: 98 MMHG | DIASTOLIC BLOOD PRESSURE: 52 MMHG

## 2020-06-27 VITALS — DIASTOLIC BLOOD PRESSURE: 46 MMHG | SYSTOLIC BLOOD PRESSURE: 107 MMHG

## 2020-06-27 VITALS — DIASTOLIC BLOOD PRESSURE: 54 MMHG | SYSTOLIC BLOOD PRESSURE: 107 MMHG

## 2020-06-27 VITALS — SYSTOLIC BLOOD PRESSURE: 109 MMHG | DIASTOLIC BLOOD PRESSURE: 56 MMHG

## 2020-06-27 VITALS — DIASTOLIC BLOOD PRESSURE: 46 MMHG | SYSTOLIC BLOOD PRESSURE: 88 MMHG

## 2020-06-27 VITALS — SYSTOLIC BLOOD PRESSURE: 105 MMHG | DIASTOLIC BLOOD PRESSURE: 56 MMHG

## 2020-06-27 VITALS — SYSTOLIC BLOOD PRESSURE: 107 MMHG | DIASTOLIC BLOOD PRESSURE: 59 MMHG

## 2020-06-27 VITALS — DIASTOLIC BLOOD PRESSURE: 47 MMHG | SYSTOLIC BLOOD PRESSURE: 103 MMHG

## 2020-06-27 VITALS — DIASTOLIC BLOOD PRESSURE: 60 MMHG | SYSTOLIC BLOOD PRESSURE: 109 MMHG

## 2020-06-27 VITALS — SYSTOLIC BLOOD PRESSURE: 105 MMHG | DIASTOLIC BLOOD PRESSURE: 48 MMHG

## 2020-06-27 VITALS — DIASTOLIC BLOOD PRESSURE: 47 MMHG | SYSTOLIC BLOOD PRESSURE: 108 MMHG

## 2020-06-27 VITALS — SYSTOLIC BLOOD PRESSURE: 106 MMHG | DIASTOLIC BLOOD PRESSURE: 52 MMHG

## 2020-06-27 VITALS — SYSTOLIC BLOOD PRESSURE: 102 MMHG | DIASTOLIC BLOOD PRESSURE: 50 MMHG

## 2020-06-27 LAB
ALBUMIN SERPL-MCNC: 1.3 G/DL (ref 3.4–5)
ALP SERPL-CCNC: 58 U/L (ref 45–117)
ALT SERPL-CCNC: 34 U/L (ref 13–56)
ANION GAP SERPL CALCULATED.3IONS-SCNC: 7 MMOL/L (ref 5–15)
BILIRUB SERPL-MCNC: 0.6 MG/DL (ref 0.2–1)
BUN SERPL-MCNC: 12 MG/DL (ref 7–18)
BUN/CREAT SERPL: 24.5
CALCIUM SERPL-MCNC: 6.9 MG/DL (ref 8.5–10.1)
CHLORIDE SERPL-SCNC: 107 MMOL/L (ref 98–107)
CO2 SERPL-SCNC: 25 MMOL/L (ref 21–32)
GLUCOSE SERPL-MCNC: 139 MG/DL (ref 74–106)
HCT VFR BLD AUTO: 32.8 % (ref 36–46)
HGB BLD-MCNC: 10.7 G/DL (ref 12.2–16.2)
MAGNESIUM SERPL-MCNC: 2 MG/DL (ref 1.6–2.6)
MCH RBC QN AUTO: 25.9 PG (ref 28–32)
MCV RBC AUTO: 79.1 FL (ref 80–100)
NRBC BLD QL AUTO: 0.1 %
POTASSIUM SERPL-SCNC: 3.4 MMOL/L (ref 3.5–5.1)
PROT SERPL-MCNC: 4.5 G/DL (ref 6.4–8.2)
SODIUM SERPL-SCNC: 139 MMOL/L (ref 136–145)

## 2020-06-27 RX ADMIN — PANTOPRAZOLE SODIUM SCH MG: 40 INJECTION, POWDER, FOR SOLUTION INTRAVENOUS at 10:03

## 2020-06-27 RX ADMIN — HUMAN INSULIN SCH UNITS: 100 INJECTION, SOLUTION SUBCUTANEOUS at 23:59

## 2020-06-27 RX ADMIN — PHENYLEPHRINE HYDROCHLORIDE SCH MLS/HR: 10 INJECTION INTRAVENOUS at 23:53

## 2020-06-27 RX ADMIN — DEXTROSE AND SODIUM CHLORIDE SCH MLS/HR: 5; 450 INJECTION, SOLUTION INTRAVENOUS at 06:30

## 2020-06-27 RX ADMIN — MIDAZOLAM SCH MLS/HR: 5 INJECTION, SOLUTION INTRAMUSCULAR; INTRAVENOUS at 23:55

## 2020-06-27 RX ADMIN — NOREPINEPHRINE BITARTRATE SCH MLS/HR: 1 INJECTION, SOLUTION, CONCENTRATE INTRAVENOUS at 21:39

## 2020-06-27 RX ADMIN — MEROPENEM SCH MLS/HR: 1 INJECTION, POWDER, FOR SOLUTION INTRAVENOUS at 10:03

## 2020-06-27 RX ADMIN — PHENYLEPHRINE HYDROCHLORIDE SCH MLS/HR: 10 INJECTION INTRAVENOUS at 09:52

## 2020-06-27 RX ADMIN — FENTANYL CITRATE SCH MLS/HR: 50 INJECTION, SOLUTION INTRAMUSCULAR; INTRAVENOUS at 13:31

## 2020-06-27 RX ADMIN — DOPAMINE HYDROCHLORIDE IN DEXTROSE SCH MLS/HR: 1.6 INJECTION, SOLUTION INTRAVENOUS at 15:47

## 2020-06-27 RX ADMIN — MIDAZOLAM SCH MLS/HR: 5 INJECTION, SOLUTION INTRAMUSCULAR; INTRAVENOUS at 05:57

## 2020-06-27 RX ADMIN — MUPIROCIN SCH APPLIC: 20 OINTMENT TOPICAL at 09:54

## 2020-06-27 RX ADMIN — DOPAMINE HYDROCHLORIDE IN DEXTROSE SCH MLS/HR: 1.6 INJECTION, SOLUTION INTRAVENOUS at 00:55

## 2020-06-27 RX ADMIN — MUPIROCIN SCH APPLIC: 20 OINTMENT TOPICAL at 22:10

## 2020-06-27 RX ADMIN — NOREPINEPHRINE BITARTRATE SCH MLS/HR: 1 INJECTION, SOLUTION, CONCENTRATE INTRAVENOUS at 03:30

## 2020-06-27 RX ADMIN — PANTOPRAZOLE SODIUM SCH MG: 40 INJECTION, POWDER, FOR SOLUTION INTRAVENOUS at 22:11

## 2020-06-27 RX ADMIN — MEROPENEM SCH MLS/HR: 1 INJECTION, POWDER, FOR SOLUTION INTRAVENOUS at 22:10

## 2020-06-27 RX ADMIN — PHENYLEPHRINE HYDROCHLORIDE SCH MLS/HR: 10 INJECTION INTRAVENOUS at 04:58

## 2020-06-27 RX ADMIN — VANCOMYCIN HYDROCHLORIDE SCH MLS/HR: 1 INJECTION, POWDER, LYOPHILIZED, FOR SOLUTION INTRAVENOUS at 13:32

## 2020-06-27 NOTE — NUR
DR. TABATHA RINALDI AT BEDSIDE: ORDERS





MD UPDATED ON PT'S CURRENT STATUS, LABS AND POC FOR TODAY. ORDERS GIVEN AND TO BE CARRIED 
OUT. WILL CONTINUE TO MONITOR.

## 2020-06-27 NOTE — NUR
DR. BERMUDEZ AT BEDSIDE: UPDATE





MD UPDATED ON PT'S CURRENT STATUS, LABS, CULTURES AND POC FOR TODAY. ORDERS GIVEN AND TO BE 
CARRIED OUT. CONTINUE CARE.

## 2020-06-27 NOTE — NUR
RECIEVED PT VENTILATED AND SEDATE, PT DOES GRIMACE SLIGHTLY, AFEBRILE , VS STABLE , ORAL 
CARE REPOSITIONED, SEE INTERVENTIONS FOR ASSESSMENT, VITAL SIGNS AND DRIPS

## 2020-06-27 NOTE — NUR
DR. MONCHO GRIFFIN CALLED: UPDATE





NO SIGNS OF ACTIVE GI BLEEDING AT THIS TIME. MD UPDATED ON PT'S CURRENT POC. WILL CONTINUE 
CURRENT TX PLAN. CONTINUE CARE.

## 2020-06-28 VITALS — SYSTOLIC BLOOD PRESSURE: 114 MMHG | DIASTOLIC BLOOD PRESSURE: 57 MMHG

## 2020-06-28 VITALS — SYSTOLIC BLOOD PRESSURE: 113 MMHG | DIASTOLIC BLOOD PRESSURE: 53 MMHG

## 2020-06-28 VITALS — SYSTOLIC BLOOD PRESSURE: 96 MMHG | DIASTOLIC BLOOD PRESSURE: 43 MMHG

## 2020-06-28 VITALS — DIASTOLIC BLOOD PRESSURE: 49 MMHG | SYSTOLIC BLOOD PRESSURE: 100 MMHG

## 2020-06-28 VITALS — DIASTOLIC BLOOD PRESSURE: 58 MMHG | SYSTOLIC BLOOD PRESSURE: 114 MMHG

## 2020-06-28 VITALS — DIASTOLIC BLOOD PRESSURE: 49 MMHG | SYSTOLIC BLOOD PRESSURE: 104 MMHG

## 2020-06-28 VITALS — SYSTOLIC BLOOD PRESSURE: 118 MMHG | DIASTOLIC BLOOD PRESSURE: 50 MMHG

## 2020-06-28 VITALS — DIASTOLIC BLOOD PRESSURE: 57 MMHG | SYSTOLIC BLOOD PRESSURE: 120 MMHG

## 2020-06-28 VITALS — SYSTOLIC BLOOD PRESSURE: 95 MMHG | DIASTOLIC BLOOD PRESSURE: 43 MMHG

## 2020-06-28 VITALS — DIASTOLIC BLOOD PRESSURE: 60 MMHG | SYSTOLIC BLOOD PRESSURE: 113 MMHG

## 2020-06-28 VITALS — DIASTOLIC BLOOD PRESSURE: 44 MMHG | SYSTOLIC BLOOD PRESSURE: 104 MMHG

## 2020-06-28 VITALS — SYSTOLIC BLOOD PRESSURE: 103 MMHG | DIASTOLIC BLOOD PRESSURE: 51 MMHG

## 2020-06-28 VITALS — SYSTOLIC BLOOD PRESSURE: 103 MMHG | DIASTOLIC BLOOD PRESSURE: 54 MMHG

## 2020-06-28 VITALS — SYSTOLIC BLOOD PRESSURE: 105 MMHG | DIASTOLIC BLOOD PRESSURE: 50 MMHG

## 2020-06-28 VITALS — SYSTOLIC BLOOD PRESSURE: 91 MMHG | DIASTOLIC BLOOD PRESSURE: 58 MMHG

## 2020-06-28 VITALS — DIASTOLIC BLOOD PRESSURE: 57 MMHG | SYSTOLIC BLOOD PRESSURE: 102 MMHG

## 2020-06-28 VITALS — SYSTOLIC BLOOD PRESSURE: 98 MMHG | DIASTOLIC BLOOD PRESSURE: 43 MMHG

## 2020-06-28 VITALS — SYSTOLIC BLOOD PRESSURE: 101 MMHG | DIASTOLIC BLOOD PRESSURE: 52 MMHG

## 2020-06-28 VITALS — DIASTOLIC BLOOD PRESSURE: 51 MMHG | SYSTOLIC BLOOD PRESSURE: 96 MMHG

## 2020-06-28 VITALS — SYSTOLIC BLOOD PRESSURE: 98 MMHG | DIASTOLIC BLOOD PRESSURE: 50 MMHG

## 2020-06-28 VITALS — DIASTOLIC BLOOD PRESSURE: 62 MMHG | SYSTOLIC BLOOD PRESSURE: 120 MMHG

## 2020-06-28 VITALS — SYSTOLIC BLOOD PRESSURE: 90 MMHG | DIASTOLIC BLOOD PRESSURE: 45 MMHG

## 2020-06-28 VITALS — SYSTOLIC BLOOD PRESSURE: 111 MMHG | DIASTOLIC BLOOD PRESSURE: 57 MMHG

## 2020-06-28 VITALS — SYSTOLIC BLOOD PRESSURE: 95 MMHG | DIASTOLIC BLOOD PRESSURE: 53 MMHG

## 2020-06-28 VITALS — DIASTOLIC BLOOD PRESSURE: 47 MMHG | SYSTOLIC BLOOD PRESSURE: 96 MMHG

## 2020-06-28 VITALS — SYSTOLIC BLOOD PRESSURE: 101 MMHG | DIASTOLIC BLOOD PRESSURE: 49 MMHG

## 2020-06-28 VITALS — SYSTOLIC BLOOD PRESSURE: 85 MMHG | DIASTOLIC BLOOD PRESSURE: 39 MMHG

## 2020-06-28 VITALS — SYSTOLIC BLOOD PRESSURE: 102 MMHG | DIASTOLIC BLOOD PRESSURE: 60 MMHG

## 2020-06-28 VITALS — SYSTOLIC BLOOD PRESSURE: 102 MMHG | DIASTOLIC BLOOD PRESSURE: 49 MMHG

## 2020-06-28 VITALS — DIASTOLIC BLOOD PRESSURE: 55 MMHG | SYSTOLIC BLOOD PRESSURE: 106 MMHG

## 2020-06-28 VITALS — SYSTOLIC BLOOD PRESSURE: 94 MMHG | DIASTOLIC BLOOD PRESSURE: 47 MMHG

## 2020-06-28 VITALS — SYSTOLIC BLOOD PRESSURE: 118 MMHG | DIASTOLIC BLOOD PRESSURE: 53 MMHG

## 2020-06-28 VITALS — SYSTOLIC BLOOD PRESSURE: 110 MMHG | DIASTOLIC BLOOD PRESSURE: 59 MMHG

## 2020-06-28 VITALS — DIASTOLIC BLOOD PRESSURE: 49 MMHG | SYSTOLIC BLOOD PRESSURE: 114 MMHG

## 2020-06-28 VITALS — SYSTOLIC BLOOD PRESSURE: 91 MMHG | DIASTOLIC BLOOD PRESSURE: 47 MMHG

## 2020-06-28 VITALS — DIASTOLIC BLOOD PRESSURE: 48 MMHG | SYSTOLIC BLOOD PRESSURE: 93 MMHG

## 2020-06-28 VITALS — DIASTOLIC BLOOD PRESSURE: 45 MMHG | SYSTOLIC BLOOD PRESSURE: 96 MMHG

## 2020-06-28 VITALS — SYSTOLIC BLOOD PRESSURE: 89 MMHG | DIASTOLIC BLOOD PRESSURE: 45 MMHG

## 2020-06-28 VITALS — SYSTOLIC BLOOD PRESSURE: 112 MMHG | DIASTOLIC BLOOD PRESSURE: 49 MMHG

## 2020-06-28 VITALS — SYSTOLIC BLOOD PRESSURE: 93 MMHG | DIASTOLIC BLOOD PRESSURE: 61 MMHG

## 2020-06-28 VITALS — DIASTOLIC BLOOD PRESSURE: 59 MMHG | SYSTOLIC BLOOD PRESSURE: 110 MMHG

## 2020-06-28 VITALS — DIASTOLIC BLOOD PRESSURE: 56 MMHG | SYSTOLIC BLOOD PRESSURE: 83 MMHG

## 2020-06-28 VITALS — DIASTOLIC BLOOD PRESSURE: 52 MMHG | SYSTOLIC BLOOD PRESSURE: 111 MMHG

## 2020-06-28 VITALS — SYSTOLIC BLOOD PRESSURE: 94 MMHG | DIASTOLIC BLOOD PRESSURE: 43 MMHG

## 2020-06-28 VITALS — DIASTOLIC BLOOD PRESSURE: 48 MMHG | SYSTOLIC BLOOD PRESSURE: 118 MMHG

## 2020-06-28 VITALS — DIASTOLIC BLOOD PRESSURE: 46 MMHG | SYSTOLIC BLOOD PRESSURE: 91 MMHG

## 2020-06-28 VITALS — DIASTOLIC BLOOD PRESSURE: 58 MMHG | SYSTOLIC BLOOD PRESSURE: 113 MMHG

## 2020-06-28 VITALS — SYSTOLIC BLOOD PRESSURE: 115 MMHG | DIASTOLIC BLOOD PRESSURE: 62 MMHG

## 2020-06-28 VITALS — DIASTOLIC BLOOD PRESSURE: 49 MMHG | SYSTOLIC BLOOD PRESSURE: 122 MMHG

## 2020-06-28 VITALS — SYSTOLIC BLOOD PRESSURE: 99 MMHG | DIASTOLIC BLOOD PRESSURE: 51 MMHG

## 2020-06-28 VITALS — SYSTOLIC BLOOD PRESSURE: 107 MMHG | DIASTOLIC BLOOD PRESSURE: 48 MMHG

## 2020-06-28 VITALS — DIASTOLIC BLOOD PRESSURE: 62 MMHG | SYSTOLIC BLOOD PRESSURE: 93 MMHG

## 2020-06-28 VITALS — DIASTOLIC BLOOD PRESSURE: 44 MMHG | SYSTOLIC BLOOD PRESSURE: 93 MMHG

## 2020-06-28 VITALS — DIASTOLIC BLOOD PRESSURE: 65 MMHG | SYSTOLIC BLOOD PRESSURE: 121 MMHG

## 2020-06-28 VITALS — DIASTOLIC BLOOD PRESSURE: 50 MMHG | SYSTOLIC BLOOD PRESSURE: 98 MMHG

## 2020-06-28 VITALS — DIASTOLIC BLOOD PRESSURE: 47 MMHG | SYSTOLIC BLOOD PRESSURE: 91 MMHG

## 2020-06-28 VITALS — SYSTOLIC BLOOD PRESSURE: 93 MMHG | DIASTOLIC BLOOD PRESSURE: 44 MMHG

## 2020-06-28 VITALS — SYSTOLIC BLOOD PRESSURE: 115 MMHG | DIASTOLIC BLOOD PRESSURE: 58 MMHG

## 2020-06-28 VITALS — SYSTOLIC BLOOD PRESSURE: 87 MMHG | DIASTOLIC BLOOD PRESSURE: 48 MMHG

## 2020-06-28 VITALS — DIASTOLIC BLOOD PRESSURE: 45 MMHG | SYSTOLIC BLOOD PRESSURE: 91 MMHG

## 2020-06-28 VITALS — DIASTOLIC BLOOD PRESSURE: 51 MMHG | SYSTOLIC BLOOD PRESSURE: 102 MMHG

## 2020-06-28 VITALS — DIASTOLIC BLOOD PRESSURE: 45 MMHG | SYSTOLIC BLOOD PRESSURE: 90 MMHG

## 2020-06-28 VITALS — SYSTOLIC BLOOD PRESSURE: 105 MMHG | DIASTOLIC BLOOD PRESSURE: 64 MMHG

## 2020-06-28 VITALS — SYSTOLIC BLOOD PRESSURE: 101 MMHG | DIASTOLIC BLOOD PRESSURE: 46 MMHG

## 2020-06-28 VITALS — DIASTOLIC BLOOD PRESSURE: 52 MMHG | SYSTOLIC BLOOD PRESSURE: 102 MMHG

## 2020-06-28 VITALS — DIASTOLIC BLOOD PRESSURE: 62 MMHG | SYSTOLIC BLOOD PRESSURE: 108 MMHG

## 2020-06-28 VITALS — DIASTOLIC BLOOD PRESSURE: 60 MMHG | SYSTOLIC BLOOD PRESSURE: 120 MMHG

## 2020-06-28 VITALS — DIASTOLIC BLOOD PRESSURE: 55 MMHG | SYSTOLIC BLOOD PRESSURE: 101 MMHG

## 2020-06-28 VITALS — SYSTOLIC BLOOD PRESSURE: 121 MMHG | DIASTOLIC BLOOD PRESSURE: 53 MMHG

## 2020-06-28 VITALS — SYSTOLIC BLOOD PRESSURE: 93 MMHG | DIASTOLIC BLOOD PRESSURE: 46 MMHG

## 2020-06-28 VITALS — SYSTOLIC BLOOD PRESSURE: 118 MMHG | DIASTOLIC BLOOD PRESSURE: 49 MMHG

## 2020-06-28 VITALS — DIASTOLIC BLOOD PRESSURE: 58 MMHG | SYSTOLIC BLOOD PRESSURE: 112 MMHG

## 2020-06-28 VITALS — SYSTOLIC BLOOD PRESSURE: 104 MMHG | DIASTOLIC BLOOD PRESSURE: 49 MMHG

## 2020-06-28 VITALS — DIASTOLIC BLOOD PRESSURE: 59 MMHG | SYSTOLIC BLOOD PRESSURE: 107 MMHG

## 2020-06-28 VITALS — SYSTOLIC BLOOD PRESSURE: 118 MMHG | DIASTOLIC BLOOD PRESSURE: 54 MMHG

## 2020-06-28 VITALS — SYSTOLIC BLOOD PRESSURE: 103 MMHG | DIASTOLIC BLOOD PRESSURE: 47 MMHG

## 2020-06-28 VITALS — SYSTOLIC BLOOD PRESSURE: 116 MMHG | DIASTOLIC BLOOD PRESSURE: 53 MMHG

## 2020-06-28 VITALS — DIASTOLIC BLOOD PRESSURE: 44 MMHG | SYSTOLIC BLOOD PRESSURE: 94 MMHG

## 2020-06-28 VITALS — DIASTOLIC BLOOD PRESSURE: 61 MMHG | SYSTOLIC BLOOD PRESSURE: 120 MMHG

## 2020-06-28 VITALS — DIASTOLIC BLOOD PRESSURE: 57 MMHG | SYSTOLIC BLOOD PRESSURE: 121 MMHG

## 2020-06-28 VITALS — SYSTOLIC BLOOD PRESSURE: 96 MMHG | DIASTOLIC BLOOD PRESSURE: 41 MMHG

## 2020-06-28 VITALS — DIASTOLIC BLOOD PRESSURE: 50 MMHG | SYSTOLIC BLOOD PRESSURE: 107 MMHG

## 2020-06-28 VITALS — DIASTOLIC BLOOD PRESSURE: 52 MMHG | SYSTOLIC BLOOD PRESSURE: 104 MMHG

## 2020-06-28 VITALS — SYSTOLIC BLOOD PRESSURE: 106 MMHG | DIASTOLIC BLOOD PRESSURE: 48 MMHG

## 2020-06-28 VITALS — DIASTOLIC BLOOD PRESSURE: 54 MMHG | SYSTOLIC BLOOD PRESSURE: 112 MMHG

## 2020-06-28 VITALS — DIASTOLIC BLOOD PRESSURE: 54 MMHG | SYSTOLIC BLOOD PRESSURE: 100 MMHG

## 2020-06-28 VITALS — SYSTOLIC BLOOD PRESSURE: 106 MMHG | DIASTOLIC BLOOD PRESSURE: 44 MMHG

## 2020-06-28 VITALS — SYSTOLIC BLOOD PRESSURE: 100 MMHG | DIASTOLIC BLOOD PRESSURE: 46 MMHG

## 2020-06-28 VITALS — SYSTOLIC BLOOD PRESSURE: 111 MMHG | DIASTOLIC BLOOD PRESSURE: 54 MMHG

## 2020-06-28 VITALS — SYSTOLIC BLOOD PRESSURE: 102 MMHG | DIASTOLIC BLOOD PRESSURE: 66 MMHG

## 2020-06-28 VITALS — SYSTOLIC BLOOD PRESSURE: 96 MMHG | DIASTOLIC BLOOD PRESSURE: 48 MMHG

## 2020-06-28 VITALS — DIASTOLIC BLOOD PRESSURE: 53 MMHG | SYSTOLIC BLOOD PRESSURE: 95 MMHG

## 2020-06-28 VITALS — DIASTOLIC BLOOD PRESSURE: 39 MMHG | SYSTOLIC BLOOD PRESSURE: 85 MMHG

## 2020-06-28 LAB
ALBUMIN SERPL-MCNC: 1.4 G/DL (ref 3.4–5)
ALP SERPL-CCNC: 57 U/L (ref 45–117)
ALT SERPL-CCNC: 24 U/L (ref 13–56)
ANION GAP SERPL CALCULATED.3IONS-SCNC: 7 MMOL/L (ref 5–15)
BILIRUB SERPL-MCNC: 0.9 MG/DL (ref 0.2–1)
BUN SERPL-MCNC: 12 MG/DL (ref 7–18)
BUN/CREAT SERPL: 30.8
CALCIUM SERPL-MCNC: 7.1 MG/DL (ref 8.5–10.1)
CHLORIDE SERPL-SCNC: 105 MMOL/L (ref 98–107)
CO2 SERPL-SCNC: 27 MMOL/L (ref 21–32)
GLUCOSE SERPL-MCNC: 127 MG/DL (ref 74–106)
MAGNESIUM SERPL-MCNC: 1.9 MG/DL (ref 1.6–2.6)
POTASSIUM SERPL-SCNC: 3.4 MMOL/L (ref 3.5–5.1)
PROT SERPL-MCNC: 4.3 G/DL (ref 6.4–8.2)
SODIUM SERPL-SCNC: 139 MMOL/L (ref 136–145)
TRIGL SERPL-MCNC: 182 MG/DL (ref ?–150)

## 2020-06-28 RX ADMIN — PANTOPRAZOLE SODIUM SCH MG: 40 INJECTION, POWDER, FOR SOLUTION INTRAVENOUS at 09:52

## 2020-06-28 RX ADMIN — HUMAN INSULIN SCH UNITS: 100 INJECTION, SOLUTION SUBCUTANEOUS at 12:47

## 2020-06-28 RX ADMIN — POTASSIUM CHLORIDE SCH MLS/HR: 200 INJECTION, SOLUTION INTRAVENOUS at 14:20

## 2020-06-28 RX ADMIN — Medication SCH STRIP: at 00:00

## 2020-06-28 RX ADMIN — PHENYLEPHRINE HYDROCHLORIDE SCH MLS/HR: 10 INJECTION INTRAVENOUS at 18:09

## 2020-06-28 RX ADMIN — MIDAZOLAM SCH MLS/HR: 5 INJECTION, SOLUTION INTRAMUSCULAR; INTRAVENOUS at 15:10

## 2020-06-28 RX ADMIN — Medication SCH STRIP: at 23:50

## 2020-06-28 RX ADMIN — MEROPENEM SCH MLS/HR: 1 INJECTION, POWDER, FOR SOLUTION INTRAVENOUS at 22:19

## 2020-06-28 RX ADMIN — Medication SCH STRIP: at 17:49

## 2020-06-28 RX ADMIN — VANCOMYCIN HYDROCHLORIDE SCH MLS/HR: 1 INJECTION, POWDER, LYOPHILIZED, FOR SOLUTION INTRAVENOUS at 21:03

## 2020-06-28 RX ADMIN — MAGNESIUM SULFATE IN DEXTROSE SCH MLS/HR: 10 INJECTION, SOLUTION INTRAVENOUS at 13:07

## 2020-06-28 RX ADMIN — MUPIROCIN SCH APPLIC: 20 OINTMENT TOPICAL at 22:20

## 2020-06-28 RX ADMIN — FUROSEMIDE SCH MG: 10 INJECTION, SOLUTION INTRAMUSCULAR; INTRAVENOUS at 22:19

## 2020-06-28 RX ADMIN — PANTOPRAZOLE SODIUM SCH MG: 40 INJECTION, POWDER, FOR SOLUTION INTRAVENOUS at 22:20

## 2020-06-28 RX ADMIN — NOREPINEPHRINE BITARTRATE SCH MLS/HR: 1 INJECTION, SOLUTION, CONCENTRATE INTRAVENOUS at 16:40

## 2020-06-28 RX ADMIN — Medication SCH STRIP: at 12:38

## 2020-06-28 RX ADMIN — PHENYLEPHRINE HYDROCHLORIDE SCH MLS/HR: 10 INJECTION INTRAVENOUS at 12:57

## 2020-06-28 RX ADMIN — MUPIROCIN SCH APPLIC: 20 OINTMENT TOPICAL at 09:48

## 2020-06-28 RX ADMIN — DEXTROSE AND SODIUM CHLORIDE SCH MLS/HR: 5; 450 INJECTION, SOLUTION INTRAVENOUS at 10:15

## 2020-06-28 RX ADMIN — POTASSIUM CHLORIDE SCH MLS/HR: 200 INJECTION, SOLUTION INTRAVENOUS at 13:19

## 2020-06-28 RX ADMIN — FENTANYL CITRATE SCH MLS/HR: 50 INJECTION, SOLUTION INTRAMUSCULAR; INTRAVENOUS at 09:26

## 2020-06-28 RX ADMIN — HUMAN INSULIN SCH UNITS: 100 INJECTION, SOLUTION SUBCUTANEOUS at 18:00

## 2020-06-28 RX ADMIN — MAGNESIUM SULFATE IN DEXTROSE SCH MLS/HR: 10 INJECTION, SOLUTION INTRAVENOUS at 14:20

## 2020-06-28 RX ADMIN — HUMAN INSULIN SCH UNITS: 100 INJECTION, SOLUTION SUBCUTANEOUS at 23:50

## 2020-06-28 RX ADMIN — DOPAMINE HYDROCHLORIDE IN DEXTROSE SCH MLS/HR: 1.6 INJECTION, SOLUTION INTRAVENOUS at 09:35

## 2020-06-28 RX ADMIN — Medication SCH STRIP: at 06:00

## 2020-06-28 RX ADMIN — MEROPENEM SCH MLS/HR: 1 INJECTION, POWDER, FOR SOLUTION INTRAVENOUS at 09:49

## 2020-06-28 RX ADMIN — DEXTROSE AND SODIUM CHLORIDE SCH MLS/HR: 5; 450 INJECTION, SOLUTION INTRAVENOUS at 19:36

## 2020-06-28 RX ADMIN — HUMAN INSULIN SCH UNITS: 100 INJECTION, SOLUTION SUBCUTANEOUS at 06:00

## 2020-06-28 RX ADMIN — VANCOMYCIN HYDROCHLORIDE SCH MLS/HR: 1 INJECTION, POWDER, LYOPHILIZED, FOR SOLUTION INTRAVENOUS at 05:00

## 2020-06-28 NOTE — NUR
Nutrition Followup/consult Notes



Wt: 91.3 kg



Pt`s intubated sedated with no family by bedside. pt is currently NPO and will be initiated 
on PN support from tonight. pt with GI bleed.



Est. Energy Needs ABW 62 [id number] kcal (23-25 kcal/kg BW), Est. Protein Needs:  62-74 
gms/day (1.0-1.2 gms/kg ABW r/t hypoalb).Will continue to monitor and reassess prn.



LABS:  H, CA 7.1 L, ALB 1.4 L,  H, PREALB 5.5 L



GI: Pt with no BM reported per RN doc.



BS: 12 high risk. Please refer to wound assessment report for full details.



PES: 1) Altered nutrition related lab values r.t current chronic medical condition aeb 
severe hypoalb hypocalcemia

Impaired swallowing r.t current medical condition aeb pt`s intubated sedated with order of 
NPO



Comments 

1) advance diet as medically feasible. 2) consider PN support to meet > 75% of needs if pt 
continues to be npo. 3) continue current plan of care

## 2020-06-28 NOTE — NUR
Resumed care at 0725, orders reviewed and ongoing assessments being done.  Being treated for 
multiple problems and remains intubated and sedated.  Remains on sedation for comfort with 
Versed and FentaNLY.  Continues to facial grimace when rendering care and at times appears 
to try to open eyes when spoken to.  



Dr. Andrews rounded at 1004 and discussed condition and plan of care.  Remains on Levophed and 
Phenylephrine for BP support.  Continue to titrate as appropriate.  Dr. Andrews stated that he 
tried to contact daughter Desire and left her a message.



Remains with low grade temperature.  Measuring with rectal probe.  Utilizing ice packs and 
cool compress on forehead.  Temperature at this time 99.9.

## 2020-06-28 NOTE — NUR
COMPLETE BATH AND LINEN CHANGE, OPTIFOAM APPLIED ON BUTTOCK, PT GRIMACES, EYES OPEN NOT 
TRACKING AT THIS TIME, REPOSITONED, AM LABS DRAWN, NO OTHER CHANGES

## 2020-06-29 VITALS — SYSTOLIC BLOOD PRESSURE: 104 MMHG | DIASTOLIC BLOOD PRESSURE: 48 MMHG

## 2020-06-29 VITALS — DIASTOLIC BLOOD PRESSURE: 47 MMHG | SYSTOLIC BLOOD PRESSURE: 111 MMHG

## 2020-06-29 VITALS — DIASTOLIC BLOOD PRESSURE: 56 MMHG | SYSTOLIC BLOOD PRESSURE: 123 MMHG

## 2020-06-29 VITALS — DIASTOLIC BLOOD PRESSURE: 48 MMHG | SYSTOLIC BLOOD PRESSURE: 128 MMHG

## 2020-06-29 VITALS — SYSTOLIC BLOOD PRESSURE: 104 MMHG | DIASTOLIC BLOOD PRESSURE: 42 MMHG

## 2020-06-29 VITALS — SYSTOLIC BLOOD PRESSURE: 102 MMHG | DIASTOLIC BLOOD PRESSURE: 52 MMHG

## 2020-06-29 VITALS — DIASTOLIC BLOOD PRESSURE: 41 MMHG | SYSTOLIC BLOOD PRESSURE: 96 MMHG

## 2020-06-29 VITALS — DIASTOLIC BLOOD PRESSURE: 49 MMHG | SYSTOLIC BLOOD PRESSURE: 99 MMHG

## 2020-06-29 VITALS — DIASTOLIC BLOOD PRESSURE: 50 MMHG | SYSTOLIC BLOOD PRESSURE: 119 MMHG

## 2020-06-29 VITALS — SYSTOLIC BLOOD PRESSURE: 111 MMHG | DIASTOLIC BLOOD PRESSURE: 60 MMHG

## 2020-06-29 VITALS — SYSTOLIC BLOOD PRESSURE: 99 MMHG | DIASTOLIC BLOOD PRESSURE: 52 MMHG

## 2020-06-29 VITALS — DIASTOLIC BLOOD PRESSURE: 46 MMHG | SYSTOLIC BLOOD PRESSURE: 103 MMHG

## 2020-06-29 VITALS — DIASTOLIC BLOOD PRESSURE: 40 MMHG | SYSTOLIC BLOOD PRESSURE: 93 MMHG

## 2020-06-29 VITALS — DIASTOLIC BLOOD PRESSURE: 39 MMHG | SYSTOLIC BLOOD PRESSURE: 98 MMHG

## 2020-06-29 VITALS — SYSTOLIC BLOOD PRESSURE: 121 MMHG | DIASTOLIC BLOOD PRESSURE: 59 MMHG

## 2020-06-29 VITALS — DIASTOLIC BLOOD PRESSURE: 46 MMHG | SYSTOLIC BLOOD PRESSURE: 99 MMHG

## 2020-06-29 VITALS — DIASTOLIC BLOOD PRESSURE: 40 MMHG | SYSTOLIC BLOOD PRESSURE: 106 MMHG

## 2020-06-29 VITALS — SYSTOLIC BLOOD PRESSURE: 89 MMHG | DIASTOLIC BLOOD PRESSURE: 43 MMHG

## 2020-06-29 VITALS — DIASTOLIC BLOOD PRESSURE: 59 MMHG | SYSTOLIC BLOOD PRESSURE: 113 MMHG

## 2020-06-29 VITALS — SYSTOLIC BLOOD PRESSURE: 93 MMHG | DIASTOLIC BLOOD PRESSURE: 43 MMHG

## 2020-06-29 VITALS — DIASTOLIC BLOOD PRESSURE: 51 MMHG | SYSTOLIC BLOOD PRESSURE: 108 MMHG

## 2020-06-29 VITALS — DIASTOLIC BLOOD PRESSURE: 54 MMHG | SYSTOLIC BLOOD PRESSURE: 106 MMHG

## 2020-06-29 VITALS — DIASTOLIC BLOOD PRESSURE: 44 MMHG | SYSTOLIC BLOOD PRESSURE: 108 MMHG

## 2020-06-29 VITALS — DIASTOLIC BLOOD PRESSURE: 50 MMHG | SYSTOLIC BLOOD PRESSURE: 98 MMHG

## 2020-06-29 VITALS — SYSTOLIC BLOOD PRESSURE: 91 MMHG | DIASTOLIC BLOOD PRESSURE: 54 MMHG

## 2020-06-29 VITALS — DIASTOLIC BLOOD PRESSURE: 49 MMHG | SYSTOLIC BLOOD PRESSURE: 110 MMHG

## 2020-06-29 VITALS — SYSTOLIC BLOOD PRESSURE: 101 MMHG | DIASTOLIC BLOOD PRESSURE: 46 MMHG

## 2020-06-29 VITALS — SYSTOLIC BLOOD PRESSURE: 115 MMHG | DIASTOLIC BLOOD PRESSURE: 55 MMHG

## 2020-06-29 VITALS — SYSTOLIC BLOOD PRESSURE: 113 MMHG | DIASTOLIC BLOOD PRESSURE: 59 MMHG

## 2020-06-29 VITALS — SYSTOLIC BLOOD PRESSURE: 104 MMHG | DIASTOLIC BLOOD PRESSURE: 50 MMHG

## 2020-06-29 VITALS — SYSTOLIC BLOOD PRESSURE: 96 MMHG | DIASTOLIC BLOOD PRESSURE: 38 MMHG

## 2020-06-29 VITALS — DIASTOLIC BLOOD PRESSURE: 57 MMHG | SYSTOLIC BLOOD PRESSURE: 109 MMHG

## 2020-06-29 VITALS — DIASTOLIC BLOOD PRESSURE: 37 MMHG | SYSTOLIC BLOOD PRESSURE: 94 MMHG

## 2020-06-29 VITALS — SYSTOLIC BLOOD PRESSURE: 97 MMHG | DIASTOLIC BLOOD PRESSURE: 47 MMHG

## 2020-06-29 VITALS — SYSTOLIC BLOOD PRESSURE: 84 MMHG | DIASTOLIC BLOOD PRESSURE: 41 MMHG

## 2020-06-29 VITALS — SYSTOLIC BLOOD PRESSURE: 91 MMHG | DIASTOLIC BLOOD PRESSURE: 48 MMHG

## 2020-06-29 VITALS — SYSTOLIC BLOOD PRESSURE: 110 MMHG | DIASTOLIC BLOOD PRESSURE: 46 MMHG

## 2020-06-29 VITALS — DIASTOLIC BLOOD PRESSURE: 45 MMHG | SYSTOLIC BLOOD PRESSURE: 106 MMHG

## 2020-06-29 VITALS — SYSTOLIC BLOOD PRESSURE: 105 MMHG | DIASTOLIC BLOOD PRESSURE: 46 MMHG

## 2020-06-29 VITALS — SYSTOLIC BLOOD PRESSURE: 87 MMHG | DIASTOLIC BLOOD PRESSURE: 49 MMHG

## 2020-06-29 VITALS — DIASTOLIC BLOOD PRESSURE: 46 MMHG | SYSTOLIC BLOOD PRESSURE: 97 MMHG

## 2020-06-29 VITALS — SYSTOLIC BLOOD PRESSURE: 102 MMHG | DIASTOLIC BLOOD PRESSURE: 40 MMHG

## 2020-06-29 VITALS — SYSTOLIC BLOOD PRESSURE: 108 MMHG | DIASTOLIC BLOOD PRESSURE: 57 MMHG

## 2020-06-29 VITALS — DIASTOLIC BLOOD PRESSURE: 47 MMHG | SYSTOLIC BLOOD PRESSURE: 102 MMHG

## 2020-06-29 VITALS — DIASTOLIC BLOOD PRESSURE: 48 MMHG | SYSTOLIC BLOOD PRESSURE: 83 MMHG

## 2020-06-29 VITALS — SYSTOLIC BLOOD PRESSURE: 101 MMHG | DIASTOLIC BLOOD PRESSURE: 54 MMHG

## 2020-06-29 VITALS — DIASTOLIC BLOOD PRESSURE: 54 MMHG | SYSTOLIC BLOOD PRESSURE: 107 MMHG

## 2020-06-29 VITALS — SYSTOLIC BLOOD PRESSURE: 97 MMHG | DIASTOLIC BLOOD PRESSURE: 48 MMHG

## 2020-06-29 VITALS — DIASTOLIC BLOOD PRESSURE: 48 MMHG | SYSTOLIC BLOOD PRESSURE: 104 MMHG

## 2020-06-29 VITALS — DIASTOLIC BLOOD PRESSURE: 46 MMHG | SYSTOLIC BLOOD PRESSURE: 98 MMHG

## 2020-06-29 VITALS — DIASTOLIC BLOOD PRESSURE: 50 MMHG | SYSTOLIC BLOOD PRESSURE: 101 MMHG

## 2020-06-29 VITALS — SYSTOLIC BLOOD PRESSURE: 100 MMHG | DIASTOLIC BLOOD PRESSURE: 51 MMHG

## 2020-06-29 VITALS — DIASTOLIC BLOOD PRESSURE: 50 MMHG | SYSTOLIC BLOOD PRESSURE: 121 MMHG

## 2020-06-29 VITALS — SYSTOLIC BLOOD PRESSURE: 119 MMHG | DIASTOLIC BLOOD PRESSURE: 57 MMHG

## 2020-06-29 VITALS — SYSTOLIC BLOOD PRESSURE: 102 MMHG | DIASTOLIC BLOOD PRESSURE: 46 MMHG

## 2020-06-29 VITALS — SYSTOLIC BLOOD PRESSURE: 104 MMHG | DIASTOLIC BLOOD PRESSURE: 54 MMHG

## 2020-06-29 VITALS — SYSTOLIC BLOOD PRESSURE: 106 MMHG | DIASTOLIC BLOOD PRESSURE: 46 MMHG

## 2020-06-29 VITALS — DIASTOLIC BLOOD PRESSURE: 52 MMHG | SYSTOLIC BLOOD PRESSURE: 113 MMHG

## 2020-06-29 VITALS — SYSTOLIC BLOOD PRESSURE: 105 MMHG | DIASTOLIC BLOOD PRESSURE: 43 MMHG

## 2020-06-29 VITALS — DIASTOLIC BLOOD PRESSURE: 51 MMHG | SYSTOLIC BLOOD PRESSURE: 106 MMHG

## 2020-06-29 VITALS — DIASTOLIC BLOOD PRESSURE: 49 MMHG | SYSTOLIC BLOOD PRESSURE: 100 MMHG

## 2020-06-29 VITALS — SYSTOLIC BLOOD PRESSURE: 104 MMHG | DIASTOLIC BLOOD PRESSURE: 45 MMHG

## 2020-06-29 VITALS — DIASTOLIC BLOOD PRESSURE: 42 MMHG | SYSTOLIC BLOOD PRESSURE: 86 MMHG

## 2020-06-29 VITALS — SYSTOLIC BLOOD PRESSURE: 95 MMHG | DIASTOLIC BLOOD PRESSURE: 43 MMHG

## 2020-06-29 VITALS — SYSTOLIC BLOOD PRESSURE: 90 MMHG | DIASTOLIC BLOOD PRESSURE: 41 MMHG

## 2020-06-29 VITALS — SYSTOLIC BLOOD PRESSURE: 111 MMHG | DIASTOLIC BLOOD PRESSURE: 53 MMHG

## 2020-06-29 VITALS — DIASTOLIC BLOOD PRESSURE: 42 MMHG | SYSTOLIC BLOOD PRESSURE: 112 MMHG

## 2020-06-29 VITALS — DIASTOLIC BLOOD PRESSURE: 48 MMHG | SYSTOLIC BLOOD PRESSURE: 105 MMHG

## 2020-06-29 VITALS — SYSTOLIC BLOOD PRESSURE: 97 MMHG | DIASTOLIC BLOOD PRESSURE: 51 MMHG

## 2020-06-29 VITALS — DIASTOLIC BLOOD PRESSURE: 50 MMHG | SYSTOLIC BLOOD PRESSURE: 84 MMHG

## 2020-06-29 VITALS — DIASTOLIC BLOOD PRESSURE: 57 MMHG | SYSTOLIC BLOOD PRESSURE: 110 MMHG

## 2020-06-29 VITALS — SYSTOLIC BLOOD PRESSURE: 106 MMHG | DIASTOLIC BLOOD PRESSURE: 50 MMHG

## 2020-06-29 VITALS — DIASTOLIC BLOOD PRESSURE: 47 MMHG | SYSTOLIC BLOOD PRESSURE: 96 MMHG

## 2020-06-29 VITALS — SYSTOLIC BLOOD PRESSURE: 124 MMHG | DIASTOLIC BLOOD PRESSURE: 52 MMHG

## 2020-06-29 VITALS — DIASTOLIC BLOOD PRESSURE: 34 MMHG | SYSTOLIC BLOOD PRESSURE: 93 MMHG

## 2020-06-29 VITALS — DIASTOLIC BLOOD PRESSURE: 53 MMHG | SYSTOLIC BLOOD PRESSURE: 106 MMHG

## 2020-06-29 VITALS — DIASTOLIC BLOOD PRESSURE: 48 MMHG | SYSTOLIC BLOOD PRESSURE: 111 MMHG

## 2020-06-29 VITALS — SYSTOLIC BLOOD PRESSURE: 106 MMHG | DIASTOLIC BLOOD PRESSURE: 61 MMHG

## 2020-06-29 VITALS — SYSTOLIC BLOOD PRESSURE: 106 MMHG | DIASTOLIC BLOOD PRESSURE: 53 MMHG

## 2020-06-29 VITALS — SYSTOLIC BLOOD PRESSURE: 98 MMHG | DIASTOLIC BLOOD PRESSURE: 50 MMHG

## 2020-06-29 VITALS — SYSTOLIC BLOOD PRESSURE: 108 MMHG | DIASTOLIC BLOOD PRESSURE: 48 MMHG

## 2020-06-29 VITALS — SYSTOLIC BLOOD PRESSURE: 87 MMHG | DIASTOLIC BLOOD PRESSURE: 45 MMHG

## 2020-06-29 VITALS — DIASTOLIC BLOOD PRESSURE: 50 MMHG | SYSTOLIC BLOOD PRESSURE: 99 MMHG

## 2020-06-29 VITALS — SYSTOLIC BLOOD PRESSURE: 109 MMHG | DIASTOLIC BLOOD PRESSURE: 46 MMHG

## 2020-06-29 VITALS — DIASTOLIC BLOOD PRESSURE: 52 MMHG | SYSTOLIC BLOOD PRESSURE: 99 MMHG

## 2020-06-29 VITALS — DIASTOLIC BLOOD PRESSURE: 48 MMHG | SYSTOLIC BLOOD PRESSURE: 106 MMHG

## 2020-06-29 VITALS — DIASTOLIC BLOOD PRESSURE: 60 MMHG | SYSTOLIC BLOOD PRESSURE: 111 MMHG

## 2020-06-29 VITALS — DIASTOLIC BLOOD PRESSURE: 46 MMHG | SYSTOLIC BLOOD PRESSURE: 95 MMHG

## 2020-06-29 VITALS — DIASTOLIC BLOOD PRESSURE: 45 MMHG | SYSTOLIC BLOOD PRESSURE: 108 MMHG

## 2020-06-29 VITALS — DIASTOLIC BLOOD PRESSURE: 46 MMHG | SYSTOLIC BLOOD PRESSURE: 108 MMHG

## 2020-06-29 VITALS — DIASTOLIC BLOOD PRESSURE: 54 MMHG | SYSTOLIC BLOOD PRESSURE: 115 MMHG

## 2020-06-29 VITALS — SYSTOLIC BLOOD PRESSURE: 105 MMHG | DIASTOLIC BLOOD PRESSURE: 55 MMHG

## 2020-06-29 VITALS — SYSTOLIC BLOOD PRESSURE: 106 MMHG | DIASTOLIC BLOOD PRESSURE: 52 MMHG

## 2020-06-29 VITALS — SYSTOLIC BLOOD PRESSURE: 98 MMHG | DIASTOLIC BLOOD PRESSURE: 46 MMHG

## 2020-06-29 VITALS — SYSTOLIC BLOOD PRESSURE: 99 MMHG | DIASTOLIC BLOOD PRESSURE: 47 MMHG

## 2020-06-29 VITALS — SYSTOLIC BLOOD PRESSURE: 101 MMHG | DIASTOLIC BLOOD PRESSURE: 53 MMHG

## 2020-06-29 VITALS — DIASTOLIC BLOOD PRESSURE: 48 MMHG | SYSTOLIC BLOOD PRESSURE: 98 MMHG

## 2020-06-29 VITALS — SYSTOLIC BLOOD PRESSURE: 98 MMHG | DIASTOLIC BLOOD PRESSURE: 47 MMHG

## 2020-06-29 VITALS — SYSTOLIC BLOOD PRESSURE: 110 MMHG | DIASTOLIC BLOOD PRESSURE: 51 MMHG

## 2020-06-29 LAB
ALBUMIN SERPL-MCNC: 1.4 G/DL (ref 3.4–5)
ALP SERPL-CCNC: 52 U/L (ref 45–117)
ALT SERPL-CCNC: 17 U/L (ref 13–56)
ANION GAP SERPL CALCULATED.3IONS-SCNC: 3 MMOL/L (ref 5–15)
BILIRUB SERPL-MCNC: 1 MG/DL (ref 0.2–1)
BUN SERPL-MCNC: 12 MG/DL (ref 7–18)
BUN/CREAT SERPL: 26.1
CALCIUM SERPL-MCNC: 7.4 MG/DL (ref 8.5–10.1)
CHLORIDE SERPL-SCNC: 106 MMOL/L (ref 98–107)
CO2 SERPL-SCNC: 30 MMOL/L (ref 21–32)
GLUCOSE SERPL-MCNC: 150 MG/DL (ref 74–106)
MAGNESIUM SERPL-MCNC: 2 MG/DL (ref 1.6–2.6)
POTASSIUM SERPL-SCNC: 3.6 MMOL/L (ref 3.5–5.1)
PROT SERPL-MCNC: 4.4 G/DL (ref 6.4–8.2)
SODIUM SERPL-SCNC: 139 MMOL/L (ref 136–145)

## 2020-06-29 RX ADMIN — Medication SCH STRIP: at 05:38

## 2020-06-29 RX ADMIN — PHENYLEPHRINE HYDROCHLORIDE SCH MLS/HR: 10 INJECTION INTRAVENOUS at 14:08

## 2020-06-29 RX ADMIN — VANCOMYCIN HYDROCHLORIDE SCH MLS/HR: 1 INJECTION, POWDER, LYOPHILIZED, FOR SOLUTION INTRAVENOUS at 12:56

## 2020-06-29 RX ADMIN — PHENYLEPHRINE HYDROCHLORIDE SCH MLS/HR: 10 INJECTION INTRAVENOUS at 22:28

## 2020-06-29 RX ADMIN — FUROSEMIDE SCH MG: 10 INJECTION, SOLUTION INTRAMUSCULAR; INTRAVENOUS at 22:05

## 2020-06-29 RX ADMIN — VANCOMYCIN HYDROCHLORIDE SCH MLS/HR: 1 INJECTION, POWDER, LYOPHILIZED, FOR SOLUTION INTRAVENOUS at 21:30

## 2020-06-29 RX ADMIN — DOPAMINE HYDROCHLORIDE IN DEXTROSE SCH MLS/HR: 1.6 INJECTION, SOLUTION INTRAVENOUS at 16:58

## 2020-06-29 RX ADMIN — NOREPINEPHRINE BITARTRATE SCH MLS/HR: 1 INJECTION, SOLUTION, CONCENTRATE INTRAVENOUS at 12:55

## 2020-06-29 RX ADMIN — HUMAN INSULIN SCH UNITS: 100 INJECTION, SOLUTION SUBCUTANEOUS at 05:55

## 2020-06-29 RX ADMIN — MEROPENEM SCH MLS/HR: 1 INJECTION, POWDER, FOR SOLUTION INTRAVENOUS at 23:30

## 2020-06-29 RX ADMIN — PHENYLEPHRINE HYDROCHLORIDE SCH MLS/HR: 10 INJECTION INTRAVENOUS at 05:48

## 2020-06-29 RX ADMIN — HUMAN INSULIN SCH UNITS: 100 INJECTION, SOLUTION SUBCUTANEOUS at 18:10

## 2020-06-29 RX ADMIN — FENTANYL CITRATE SCH MLS/HR: 50 INJECTION, SOLUTION INTRAMUSCULAR; INTRAVENOUS at 05:58

## 2020-06-29 RX ADMIN — FUROSEMIDE SCH MG: 10 INJECTION, SOLUTION INTRAMUSCULAR; INTRAVENOUS at 10:32

## 2020-06-29 RX ADMIN — Medication SCH STRIP: at 18:08

## 2020-06-29 RX ADMIN — MEROPENEM SCH MLS/HR: 1 INJECTION, POWDER, FOR SOLUTION INTRAVENOUS at 10:32

## 2020-06-29 RX ADMIN — PANTOPRAZOLE SODIUM SCH MG: 40 INJECTION, POWDER, FOR SOLUTION INTRAVENOUS at 10:32

## 2020-06-29 RX ADMIN — PANTOPRAZOLE SODIUM SCH MG: 40 INJECTION, POWDER, FOR SOLUTION INTRAVENOUS at 22:05

## 2020-06-29 RX ADMIN — MUPIROCIN SCH APPLIC: 20 OINTMENT TOPICAL at 22:05

## 2020-06-29 RX ADMIN — NOREPINEPHRINE BITARTRATE SCH MLS/HR: 1 INJECTION, SOLUTION, CONCENTRATE INTRAVENOUS at 02:35

## 2020-06-29 RX ADMIN — MUPIROCIN SCH APPLIC: 20 OINTMENT TOPICAL at 10:32

## 2020-06-29 RX ADMIN — DOPAMINE HYDROCHLORIDE IN DEXTROSE SCH MLS/HR: 1.6 INJECTION, SOLUTION INTRAVENOUS at 01:55

## 2020-06-29 RX ADMIN — HUMAN INSULIN SCH UNITS: 100 INJECTION, SOLUTION SUBCUTANEOUS at 11:39

## 2020-06-29 RX ADMIN — Medication SCH STRIP: at 05:55

## 2020-06-29 NOTE — NUR
NEURO STATUS

PATIENT OPEN EYES TO TOUCH, WITHDRAWS TO PAINFUL STIMULI, AND DOESN'T FOLLOW COMMANDS. 
FENTANYL 100MCG/HR INFUSING. VERSED DIDN'T RESTART.

## 2020-06-29 NOTE — NUR
CARES

Partial linen change complete. Skin reassessment performed. Skin intact. Oral care complete. 
Patient repositioned on side. Bed locked in lowest position, alarms in place. Will continue 
to monitor.

## 2020-06-29 NOTE — NUR
VENT CHANGES ORDERED BY DR CAMEJO. PT NOW ON SETTINGS: SIMV, RR 12, , PEEP +5, PS 
8, FIO2 30%. CHANGES MADE WITHOUT INCIDENT. PER MD, PLAN TO WEAN/CPAP TRIAL TOMORROW, WILL 
ENDORSE TO ONCOMING RT.

## 2020-06-29 NOTE — NUR
SEDATION

PATIENT IS ON MODERATE SEDATION. FENTANYL 100MCG/HR INFUSING. VERSED DIDN'T RESTART. WILL 
MONITOR PATIENT'S  SEDATION STATUS

## 2020-06-29 NOTE — NUR
INITIAL CONTACT



REPORT RECEIVED FROM KORI GRAHAM, CARE ASSUMED.

FEMALE PT ORALLY INTUBATED, SEDATED ON FENTANYL GTT. PT GRIMACES AND WITHDRAWS TO TACTILE 
STIMULI. AFEBRILE. SINUS RHYTHM 80'S ON CARDIAC MONITOR. PULSES PALPABLE RADIAL AND PEDAL 
BILATERALLY. PITTING EDEMA PRESENT ON ALL EXTREMITIES. LEVOPHED GTT INFUSING AT 12MCG/MIN. 
D5 0.45 NS INFUSING AT 30 ML/HR. PT WITH R. IJ TLC ALL PORTS PATENT. DRESSING CDI. TLC TO 
RIGHT FEMORAL. ALL PORTS PATENT. DRESSING CDI. NGT TO LEFT NARE CLAMPED. ZAYAS TO GRAVITY 
DRAINING CLEAR YELLOW URINE. BILATERAL SCD'S IN PLACE. SEE SKIN/WOUND ASSESSMENT. BED IN 
LOWEST LOCKED POSITION. PILLOWS USED TO OFFLOAD BONY PROMINENCES AND BILATERAL HEELS. HOB 
ELEVATED 30 DEGREES. ORAL CARE PROVIDED. ALARMS IN PLACE. WILL CONTINUE TO MONITOR.

## 2020-06-29 NOTE — NUR
REPORT RECEIVED AND ASSUMED CARE; SEE INTERVENTIONS FOR ASSESSMENT; SEE IV SPREADSHEET FOR 
GTTS; VS STABLE AT THIS TIME; WILL CONT. TO MONITOR.

## 2020-06-30 VITALS — SYSTOLIC BLOOD PRESSURE: 120 MMHG | DIASTOLIC BLOOD PRESSURE: 54 MMHG

## 2020-06-30 VITALS — DIASTOLIC BLOOD PRESSURE: 56 MMHG | SYSTOLIC BLOOD PRESSURE: 118 MMHG

## 2020-06-30 VITALS — DIASTOLIC BLOOD PRESSURE: 63 MMHG | SYSTOLIC BLOOD PRESSURE: 134 MMHG

## 2020-06-30 VITALS — DIASTOLIC BLOOD PRESSURE: 58 MMHG | SYSTOLIC BLOOD PRESSURE: 114 MMHG

## 2020-06-30 VITALS — SYSTOLIC BLOOD PRESSURE: 110 MMHG | DIASTOLIC BLOOD PRESSURE: 48 MMHG

## 2020-06-30 VITALS — DIASTOLIC BLOOD PRESSURE: 54 MMHG | SYSTOLIC BLOOD PRESSURE: 120 MMHG

## 2020-06-30 VITALS — DIASTOLIC BLOOD PRESSURE: 57 MMHG | SYSTOLIC BLOOD PRESSURE: 117 MMHG

## 2020-06-30 VITALS — SYSTOLIC BLOOD PRESSURE: 121 MMHG | DIASTOLIC BLOOD PRESSURE: 53 MMHG

## 2020-06-30 VITALS — DIASTOLIC BLOOD PRESSURE: 47 MMHG | SYSTOLIC BLOOD PRESSURE: 107 MMHG

## 2020-06-30 VITALS — DIASTOLIC BLOOD PRESSURE: 50 MMHG | SYSTOLIC BLOOD PRESSURE: 109 MMHG

## 2020-06-30 VITALS — DIASTOLIC BLOOD PRESSURE: 64 MMHG | SYSTOLIC BLOOD PRESSURE: 128 MMHG

## 2020-06-30 VITALS — DIASTOLIC BLOOD PRESSURE: 38 MMHG | SYSTOLIC BLOOD PRESSURE: 104 MMHG

## 2020-06-30 VITALS — DIASTOLIC BLOOD PRESSURE: 55 MMHG | SYSTOLIC BLOOD PRESSURE: 122 MMHG

## 2020-06-30 VITALS — DIASTOLIC BLOOD PRESSURE: 48 MMHG | SYSTOLIC BLOOD PRESSURE: 108 MMHG

## 2020-06-30 VITALS — DIASTOLIC BLOOD PRESSURE: 51 MMHG | SYSTOLIC BLOOD PRESSURE: 109 MMHG

## 2020-06-30 VITALS — DIASTOLIC BLOOD PRESSURE: 65 MMHG | SYSTOLIC BLOOD PRESSURE: 143 MMHG

## 2020-06-30 VITALS — DIASTOLIC BLOOD PRESSURE: 55 MMHG | SYSTOLIC BLOOD PRESSURE: 120 MMHG

## 2020-06-30 VITALS — SYSTOLIC BLOOD PRESSURE: 135 MMHG | DIASTOLIC BLOOD PRESSURE: 68 MMHG

## 2020-06-30 VITALS — DIASTOLIC BLOOD PRESSURE: 46 MMHG | SYSTOLIC BLOOD PRESSURE: 97 MMHG

## 2020-06-30 VITALS — SYSTOLIC BLOOD PRESSURE: 121 MMHG | DIASTOLIC BLOOD PRESSURE: 64 MMHG

## 2020-06-30 VITALS — DIASTOLIC BLOOD PRESSURE: 49 MMHG | SYSTOLIC BLOOD PRESSURE: 99 MMHG

## 2020-06-30 VITALS — DIASTOLIC BLOOD PRESSURE: 70 MMHG | SYSTOLIC BLOOD PRESSURE: 123 MMHG

## 2020-06-30 VITALS — SYSTOLIC BLOOD PRESSURE: 102 MMHG | DIASTOLIC BLOOD PRESSURE: 48 MMHG

## 2020-06-30 VITALS — DIASTOLIC BLOOD PRESSURE: 45 MMHG | SYSTOLIC BLOOD PRESSURE: 108 MMHG

## 2020-06-30 VITALS — SYSTOLIC BLOOD PRESSURE: 108 MMHG | DIASTOLIC BLOOD PRESSURE: 51 MMHG

## 2020-06-30 VITALS — SYSTOLIC BLOOD PRESSURE: 126 MMHG | DIASTOLIC BLOOD PRESSURE: 56 MMHG

## 2020-06-30 VITALS — DIASTOLIC BLOOD PRESSURE: 71 MMHG | SYSTOLIC BLOOD PRESSURE: 124 MMHG

## 2020-06-30 VITALS — SYSTOLIC BLOOD PRESSURE: 124 MMHG | DIASTOLIC BLOOD PRESSURE: 69 MMHG

## 2020-06-30 VITALS — SYSTOLIC BLOOD PRESSURE: 132 MMHG | DIASTOLIC BLOOD PRESSURE: 63 MMHG

## 2020-06-30 VITALS — SYSTOLIC BLOOD PRESSURE: 109 MMHG | DIASTOLIC BLOOD PRESSURE: 57 MMHG

## 2020-06-30 VITALS — SYSTOLIC BLOOD PRESSURE: 100 MMHG | DIASTOLIC BLOOD PRESSURE: 51 MMHG

## 2020-06-30 VITALS — SYSTOLIC BLOOD PRESSURE: 139 MMHG | DIASTOLIC BLOOD PRESSURE: 69 MMHG

## 2020-06-30 VITALS — SYSTOLIC BLOOD PRESSURE: 117 MMHG | DIASTOLIC BLOOD PRESSURE: 57 MMHG

## 2020-06-30 VITALS — SYSTOLIC BLOOD PRESSURE: 95 MMHG | DIASTOLIC BLOOD PRESSURE: 42 MMHG

## 2020-06-30 VITALS — DIASTOLIC BLOOD PRESSURE: 62 MMHG | SYSTOLIC BLOOD PRESSURE: 117 MMHG

## 2020-06-30 VITALS — SYSTOLIC BLOOD PRESSURE: 126 MMHG | DIASTOLIC BLOOD PRESSURE: 58 MMHG

## 2020-06-30 VITALS — DIASTOLIC BLOOD PRESSURE: 53 MMHG | SYSTOLIC BLOOD PRESSURE: 125 MMHG

## 2020-06-30 VITALS — DIASTOLIC BLOOD PRESSURE: 56 MMHG | SYSTOLIC BLOOD PRESSURE: 115 MMHG

## 2020-06-30 VITALS — DIASTOLIC BLOOD PRESSURE: 66 MMHG | SYSTOLIC BLOOD PRESSURE: 136 MMHG

## 2020-06-30 VITALS — SYSTOLIC BLOOD PRESSURE: 107 MMHG | DIASTOLIC BLOOD PRESSURE: 47 MMHG

## 2020-06-30 VITALS — DIASTOLIC BLOOD PRESSURE: 51 MMHG | SYSTOLIC BLOOD PRESSURE: 108 MMHG

## 2020-06-30 VITALS — SYSTOLIC BLOOD PRESSURE: 110 MMHG | DIASTOLIC BLOOD PRESSURE: 47 MMHG

## 2020-06-30 VITALS — DIASTOLIC BLOOD PRESSURE: 53 MMHG | SYSTOLIC BLOOD PRESSURE: 115 MMHG

## 2020-06-30 VITALS — DIASTOLIC BLOOD PRESSURE: 48 MMHG | SYSTOLIC BLOOD PRESSURE: 105 MMHG

## 2020-06-30 VITALS — SYSTOLIC BLOOD PRESSURE: 134 MMHG | DIASTOLIC BLOOD PRESSURE: 72 MMHG

## 2020-06-30 VITALS — DIASTOLIC BLOOD PRESSURE: 48 MMHG | SYSTOLIC BLOOD PRESSURE: 111 MMHG

## 2020-06-30 VITALS — SYSTOLIC BLOOD PRESSURE: 99 MMHG | DIASTOLIC BLOOD PRESSURE: 50 MMHG

## 2020-06-30 VITALS — DIASTOLIC BLOOD PRESSURE: 58 MMHG | SYSTOLIC BLOOD PRESSURE: 126 MMHG

## 2020-06-30 VITALS — DIASTOLIC BLOOD PRESSURE: 52 MMHG | SYSTOLIC BLOOD PRESSURE: 104 MMHG

## 2020-06-30 VITALS — SYSTOLIC BLOOD PRESSURE: 131 MMHG | DIASTOLIC BLOOD PRESSURE: 67 MMHG

## 2020-06-30 VITALS — SYSTOLIC BLOOD PRESSURE: 135 MMHG | DIASTOLIC BLOOD PRESSURE: 61 MMHG

## 2020-06-30 VITALS — DIASTOLIC BLOOD PRESSURE: 69 MMHG | SYSTOLIC BLOOD PRESSURE: 132 MMHG

## 2020-06-30 VITALS — SYSTOLIC BLOOD PRESSURE: 109 MMHG | DIASTOLIC BLOOD PRESSURE: 50 MMHG

## 2020-06-30 VITALS — SYSTOLIC BLOOD PRESSURE: 128 MMHG | DIASTOLIC BLOOD PRESSURE: 60 MMHG

## 2020-06-30 VITALS — DIASTOLIC BLOOD PRESSURE: 49 MMHG | SYSTOLIC BLOOD PRESSURE: 111 MMHG

## 2020-06-30 VITALS — SYSTOLIC BLOOD PRESSURE: 111 MMHG | DIASTOLIC BLOOD PRESSURE: 50 MMHG

## 2020-06-30 VITALS — DIASTOLIC BLOOD PRESSURE: 40 MMHG | SYSTOLIC BLOOD PRESSURE: 93 MMHG

## 2020-06-30 VITALS — SYSTOLIC BLOOD PRESSURE: 145 MMHG | DIASTOLIC BLOOD PRESSURE: 61 MMHG

## 2020-06-30 VITALS — DIASTOLIC BLOOD PRESSURE: 51 MMHG | SYSTOLIC BLOOD PRESSURE: 94 MMHG

## 2020-06-30 VITALS — SYSTOLIC BLOOD PRESSURE: 109 MMHG | DIASTOLIC BLOOD PRESSURE: 51 MMHG

## 2020-06-30 VITALS — DIASTOLIC BLOOD PRESSURE: 49 MMHG | SYSTOLIC BLOOD PRESSURE: 93 MMHG

## 2020-06-30 VITALS — SYSTOLIC BLOOD PRESSURE: 98 MMHG | DIASTOLIC BLOOD PRESSURE: 47 MMHG

## 2020-06-30 VITALS — SYSTOLIC BLOOD PRESSURE: 120 MMHG | DIASTOLIC BLOOD PRESSURE: 53 MMHG

## 2020-06-30 VITALS — DIASTOLIC BLOOD PRESSURE: 70 MMHG | SYSTOLIC BLOOD PRESSURE: 137 MMHG

## 2020-06-30 VITALS — DIASTOLIC BLOOD PRESSURE: 63 MMHG | SYSTOLIC BLOOD PRESSURE: 126 MMHG

## 2020-06-30 VITALS — SYSTOLIC BLOOD PRESSURE: 101 MMHG | DIASTOLIC BLOOD PRESSURE: 48 MMHG

## 2020-06-30 VITALS — DIASTOLIC BLOOD PRESSURE: 59 MMHG | SYSTOLIC BLOOD PRESSURE: 121 MMHG

## 2020-06-30 VITALS — DIASTOLIC BLOOD PRESSURE: 53 MMHG | SYSTOLIC BLOOD PRESSURE: 106 MMHG

## 2020-06-30 VITALS — DIASTOLIC BLOOD PRESSURE: 53 MMHG | SYSTOLIC BLOOD PRESSURE: 127 MMHG

## 2020-06-30 VITALS — DIASTOLIC BLOOD PRESSURE: 46 MMHG | SYSTOLIC BLOOD PRESSURE: 100 MMHG

## 2020-06-30 VITALS — SYSTOLIC BLOOD PRESSURE: 121 MMHG | DIASTOLIC BLOOD PRESSURE: 59 MMHG

## 2020-06-30 VITALS — SYSTOLIC BLOOD PRESSURE: 123 MMHG | DIASTOLIC BLOOD PRESSURE: 60 MMHG

## 2020-06-30 VITALS — SYSTOLIC BLOOD PRESSURE: 100 MMHG | DIASTOLIC BLOOD PRESSURE: 44 MMHG

## 2020-06-30 VITALS — SYSTOLIC BLOOD PRESSURE: 114 MMHG | DIASTOLIC BLOOD PRESSURE: 58 MMHG

## 2020-06-30 VITALS — DIASTOLIC BLOOD PRESSURE: 52 MMHG | SYSTOLIC BLOOD PRESSURE: 110 MMHG

## 2020-06-30 VITALS — DIASTOLIC BLOOD PRESSURE: 46 MMHG | SYSTOLIC BLOOD PRESSURE: 108 MMHG

## 2020-06-30 VITALS — SYSTOLIC BLOOD PRESSURE: 136 MMHG | DIASTOLIC BLOOD PRESSURE: 66 MMHG

## 2020-06-30 VITALS — SYSTOLIC BLOOD PRESSURE: 109 MMHG | DIASTOLIC BLOOD PRESSURE: 52 MMHG

## 2020-06-30 VITALS — DIASTOLIC BLOOD PRESSURE: 68 MMHG | SYSTOLIC BLOOD PRESSURE: 132 MMHG

## 2020-06-30 VITALS — SYSTOLIC BLOOD PRESSURE: 103 MMHG | DIASTOLIC BLOOD PRESSURE: 48 MMHG

## 2020-06-30 VITALS — SYSTOLIC BLOOD PRESSURE: 127 MMHG | DIASTOLIC BLOOD PRESSURE: 56 MMHG

## 2020-06-30 VITALS — DIASTOLIC BLOOD PRESSURE: 52 MMHG | SYSTOLIC BLOOD PRESSURE: 101 MMHG

## 2020-06-30 VITALS — SYSTOLIC BLOOD PRESSURE: 106 MMHG | DIASTOLIC BLOOD PRESSURE: 49 MMHG

## 2020-06-30 VITALS — DIASTOLIC BLOOD PRESSURE: 50 MMHG | SYSTOLIC BLOOD PRESSURE: 113 MMHG

## 2020-06-30 VITALS — DIASTOLIC BLOOD PRESSURE: 49 MMHG | SYSTOLIC BLOOD PRESSURE: 113 MMHG

## 2020-06-30 VITALS — SYSTOLIC BLOOD PRESSURE: 122 MMHG | DIASTOLIC BLOOD PRESSURE: 53 MMHG

## 2020-06-30 VITALS — SYSTOLIC BLOOD PRESSURE: 111 MMHG | DIASTOLIC BLOOD PRESSURE: 56 MMHG

## 2020-06-30 VITALS — SYSTOLIC BLOOD PRESSURE: 113 MMHG | DIASTOLIC BLOOD PRESSURE: 50 MMHG

## 2020-06-30 VITALS — DIASTOLIC BLOOD PRESSURE: 50 MMHG | SYSTOLIC BLOOD PRESSURE: 100 MMHG

## 2020-06-30 VITALS — DIASTOLIC BLOOD PRESSURE: 52 MMHG | SYSTOLIC BLOOD PRESSURE: 108 MMHG

## 2020-06-30 VITALS — DIASTOLIC BLOOD PRESSURE: 65 MMHG | SYSTOLIC BLOOD PRESSURE: 119 MMHG

## 2020-06-30 VITALS — SYSTOLIC BLOOD PRESSURE: 108 MMHG | DIASTOLIC BLOOD PRESSURE: 48 MMHG

## 2020-06-30 VITALS — DIASTOLIC BLOOD PRESSURE: 44 MMHG | SYSTOLIC BLOOD PRESSURE: 114 MMHG

## 2020-06-30 VITALS — SYSTOLIC BLOOD PRESSURE: 105 MMHG | DIASTOLIC BLOOD PRESSURE: 49 MMHG

## 2020-06-30 VITALS — DIASTOLIC BLOOD PRESSURE: 49 MMHG | SYSTOLIC BLOOD PRESSURE: 106 MMHG

## 2020-06-30 LAB
ALBUMIN SERPL-MCNC: 1.3 G/DL (ref 3.4–5)
ALP SERPL-CCNC: 49 U/L (ref 45–117)
ALT SERPL-CCNC: 15 U/L (ref 13–56)
ANION GAP SERPL CALCULATED.3IONS-SCNC: 3 MMOL/L (ref 5–15)
BILIRUB SERPL-MCNC: 1.1 MG/DL (ref 0.2–1)
BUN SERPL-MCNC: 12 MG/DL (ref 7–18)
BUN/CREAT SERPL: 27.9
CALCIUM SERPL-MCNC: 7.7 MG/DL (ref 8.5–10.1)
CHLORIDE SERPL-SCNC: 103 MMOL/L (ref 98–107)
CO2 SERPL-SCNC: 33 MMOL/L (ref 21–32)
GLUCOSE SERPL-MCNC: 125 MG/DL (ref 74–106)
HCT VFR BLD AUTO: 29.3 % (ref 36–46)
HGB BLD-MCNC: 9.7 G/DL (ref 12.2–16.2)
MAGNESIUM SERPL-MCNC: 1.8 MG/DL (ref 1.6–2.6)
MCH RBC QN AUTO: 26.2 PG (ref 28–32)
MCV RBC AUTO: 78.9 FL (ref 80–100)
NRBC BLD QL AUTO: 0.4 %
POTASSIUM SERPL-SCNC: 3.3 MMOL/L (ref 3.5–5.1)
PROT SERPL-MCNC: 4.2 G/DL (ref 6.4–8.2)
SODIUM SERPL-SCNC: 139 MMOL/L (ref 136–145)

## 2020-06-30 RX ADMIN — HUMAN INSULIN SCH UNITS: 100 INJECTION, SOLUTION SUBCUTANEOUS at 06:00

## 2020-06-30 RX ADMIN — Medication SCH STRIP: at 06:00

## 2020-06-30 RX ADMIN — Medication SCH STRIP: at 18:00

## 2020-06-30 RX ADMIN — DEXTROSE AND SODIUM CHLORIDE SCH MLS/HR: 5; 450 INJECTION, SOLUTION INTRAVENOUS at 11:46

## 2020-06-30 RX ADMIN — NOREPINEPHRINE BITARTRATE SCH MLS/HR: 1 INJECTION, SOLUTION, CONCENTRATE INTRAVENOUS at 19:33

## 2020-06-30 RX ADMIN — MEROPENEM SCH MLS/HR: 1 INJECTION, POWDER, FOR SOLUTION INTRAVENOUS at 09:59

## 2020-06-30 RX ADMIN — PHENYLEPHRINE HYDROCHLORIDE SCH MLS/HR: 10 INJECTION INTRAVENOUS at 11:47

## 2020-06-30 RX ADMIN — DOPAMINE HYDROCHLORIDE IN DEXTROSE SCH MLS/HR: 1.6 INJECTION, SOLUTION INTRAVENOUS at 10:35

## 2020-06-30 RX ADMIN — HUMAN INSULIN SCH UNITS: 100 INJECTION, SOLUTION SUBCUTANEOUS at 18:00

## 2020-06-30 RX ADMIN — FENTANYL CITRATE SCH MLS/HR: 50 INJECTION, SOLUTION INTRAMUSCULAR; INTRAVENOUS at 09:26

## 2020-06-30 RX ADMIN — MUPIROCIN SCH APPLIC: 20 OINTMENT TOPICAL at 11:46

## 2020-06-30 RX ADMIN — FUROSEMIDE SCH MG: 10 INJECTION, SOLUTION INTRAMUSCULAR; INTRAVENOUS at 10:00

## 2020-06-30 RX ADMIN — VANCOMYCIN HYDROCHLORIDE SCH MLS/HR: 1 INJECTION, POWDER, LYOPHILIZED, FOR SOLUTION INTRAVENOUS at 22:08

## 2020-06-30 RX ADMIN — VANCOMYCIN HYDROCHLORIDE SCH MLS/HR: 1 INJECTION, POWDER, LYOPHILIZED, FOR SOLUTION INTRAVENOUS at 07:46

## 2020-06-30 RX ADMIN — FUROSEMIDE SCH MG: 10 INJECTION, SOLUTION INTRAMUSCULAR; INTRAVENOUS at 22:08

## 2020-06-30 RX ADMIN — HUMAN INSULIN SCH UNITS: 100 INJECTION, SOLUTION SUBCUTANEOUS at 01:30

## 2020-06-30 RX ADMIN — DEXTROSE AND SODIUM CHLORIDE SCH MLS/HR: 5; 450 INJECTION, SOLUTION INTRAVENOUS at 22:14

## 2020-06-30 RX ADMIN — MIDAZOLAM SCH MLS/HR: 5 INJECTION, SOLUTION INTRAMUSCULAR; INTRAVENOUS at 14:47

## 2020-06-30 RX ADMIN — NOREPINEPHRINE BITARTRATE SCH MLS/HR: 1 INJECTION, SOLUTION, CONCENTRATE INTRAVENOUS at 15:15

## 2020-06-30 RX ADMIN — MEROPENEM SCH MLS/HR: 1 INJECTION, POWDER, FOR SOLUTION INTRAVENOUS at 22:08

## 2020-06-30 RX ADMIN — PHENYLEPHRINE HYDROCHLORIDE SCH MLS/HR: 10 INJECTION INTRAVENOUS at 09:00

## 2020-06-30 RX ADMIN — Medication SCH STRIP: at 01:30

## 2020-06-30 RX ADMIN — Medication SCH STRIP: at 11:45

## 2020-06-30 RX ADMIN — PANTOPRAZOLE SODIUM SCH MG: 40 INJECTION, POWDER, FOR SOLUTION INTRAVENOUS at 10:00

## 2020-06-30 RX ADMIN — MUPIROCIN SCH APPLIC: 20 OINTMENT TOPICAL at 22:08

## 2020-06-30 RX ADMIN — PANTOPRAZOLE SODIUM SCH MG: 40 INJECTION, POWDER, FOR SOLUTION INTRAVENOUS at 22:09

## 2020-06-30 RX ADMIN — HUMAN INSULIN SCH UNITS: 100 INJECTION, SOLUTION SUBCUTANEOUS at 11:45

## 2020-06-30 NOTE — NUR
AM ORDER REVIEWED FOR CPAP. PT OFF SEDATION AND IS AROUSABLE, FOLLOWS COMMANDS, BUT IS 
DROWSY. WILL CONTINUE TO MONITOR PT FOR POSSIBLE CPAP TRIAL TODAY.

## 2020-06-30 NOTE — NUR
ROUNDING NOTE

PATIENT OBSERVED RESTING AT THIS TIME. SEDATION OFF, PT NOT OPENING EYES OR FOLLOWING 
COMMANDS. CPAP TRIAL WHEN AWAKE. ALL VITAL SIGNS REMAIN STABLE AT THIS TIME. WILL CONTINUE 
TO MONITOR.

## 2020-06-30 NOTE — NUR
INITIAL CONTACT



REPORT RECEIVED FROM JAYJAY GRAHAM, CARE ASSUMED.

FEMALE PT ORALLY INTUBATED, SEDATED ON FENTANYL GTT. PT GRIMACES, OPENS EYES, AND WITHDRAWS 
TO TACTILE STIMULI. DOES NOT FOLLOW COMMANDS AT THIS TIME. AFEBRILE. SINUS RHYTHM ON CARDIAC 
MONITOR. PULSES PALPABLE RADIAL AND PEDAL BILATERALLY. PITTING EDEMA PRESENT ON ALL 
EXTREMITIES. LEVOPHED GTT INFUSING AT 12 MCG/MIN. D5 0.45 NS INFUSING AT 30 ML/HR. PT WITH 
R. IJ TLC ALL PORTS PATENT. DRESSING CDI. TLC TO RIGHT FEMORAL. ALL PORTS PATENT. DRESSING 
CDI. NGT TO LEFT NARE CLAMPED. ZAYAS TO GRAVITY DRAINING CLEAR YELLOW URINE. BILATERAL SCD'S 
IN PLACE. SEE SKIN/WOUND ASSESSMENT. BED IN LOWEST LOCKED POSITION. PILLOWS USED TO OFFLOAD 
BONY PROMINENCES AND BILATERAL HEELS. HOB ELEVATED 30 DEGREES. ORAL CARE PROVIDED. ALARMS IN 
PLACE. WILL CONTINUE TO MONITOR.

## 2020-06-30 NOTE — NUR
Nutrition Followup Notes



Wt: 91.0 kg



Pt`s intubated sedated with no family by bedside. pt is currently NPO since , with Amino 
Acid PN support running @ 42 ml/hr . Pt with GI bleed and hypoactive bowel sounds.



Est. Energy Needs ABW 62 [id number] kcal (23-25 kcal/kg BW), Est. Protein Needs:  62-74 
gms/day (1.0-1.2 gms/kg ABW r/t hypoalb).Will continue to monitor and reassess prn.



LABS:  H, CA 7.1 L, ALB 1.3 L, POT 3.3 L



GI: Pt with no BM reported per RN doc.



BS: 12 high risk. Please refer to wound assessment report for full details.



PES: 1) Altered nutrition related lab values r.t current chronic medical condition aeb 
severe hypoalb hypocalcemia

Impaired swallowing r.t current medical condition aeb pt`s intubated sedated with order of 
NPO



Comments 

1) advance diet as medically feasible. 

2) consider PN support to meet > 75% of needs if pt continues to be npo. 

3) continue current plan of care

## 2020-06-30 NOTE — NUR
CARES

Partial linen change complete. Skin reassessment performed. Skin intact. Oral care complete. 
Patient repositioned on side. Bed locked in lowest position, alarms in place. Patient not 
opening eyes, but does facial grimacing with any movement. Will continue to monitor.

## 2020-06-30 NOTE — NUR
SEDATION VACATION 



PATIENT TOLERATING VENTILATION. SEDATION TITRATING OFF. PATIENT TOO DROWSY. WILL ATTEMPT 
CPAP TRIAL WHEN PATIENT ABLE TO STAY AWAKE AND ALERT. ALL OTHER VSS AT THIS TIME.

## 2020-07-01 VITALS — DIASTOLIC BLOOD PRESSURE: 65 MMHG | SYSTOLIC BLOOD PRESSURE: 118 MMHG

## 2020-07-01 VITALS — DIASTOLIC BLOOD PRESSURE: 55 MMHG | SYSTOLIC BLOOD PRESSURE: 103 MMHG

## 2020-07-01 VITALS — SYSTOLIC BLOOD PRESSURE: 123 MMHG | DIASTOLIC BLOOD PRESSURE: 60 MMHG

## 2020-07-01 VITALS — SYSTOLIC BLOOD PRESSURE: 116 MMHG | DIASTOLIC BLOOD PRESSURE: 51 MMHG

## 2020-07-01 VITALS — SYSTOLIC BLOOD PRESSURE: 111 MMHG | DIASTOLIC BLOOD PRESSURE: 46 MMHG

## 2020-07-01 VITALS — SYSTOLIC BLOOD PRESSURE: 106 MMHG | DIASTOLIC BLOOD PRESSURE: 48 MMHG

## 2020-07-01 VITALS — DIASTOLIC BLOOD PRESSURE: 57 MMHG | SYSTOLIC BLOOD PRESSURE: 121 MMHG

## 2020-07-01 VITALS — SYSTOLIC BLOOD PRESSURE: 123 MMHG | DIASTOLIC BLOOD PRESSURE: 55 MMHG

## 2020-07-01 VITALS — DIASTOLIC BLOOD PRESSURE: 66 MMHG | SYSTOLIC BLOOD PRESSURE: 145 MMHG

## 2020-07-01 VITALS — DIASTOLIC BLOOD PRESSURE: 53 MMHG | SYSTOLIC BLOOD PRESSURE: 122 MMHG

## 2020-07-01 VITALS — DIASTOLIC BLOOD PRESSURE: 64 MMHG | SYSTOLIC BLOOD PRESSURE: 109 MMHG

## 2020-07-01 VITALS — SYSTOLIC BLOOD PRESSURE: 124 MMHG | DIASTOLIC BLOOD PRESSURE: 60 MMHG

## 2020-07-01 VITALS — SYSTOLIC BLOOD PRESSURE: 123 MMHG | DIASTOLIC BLOOD PRESSURE: 66 MMHG

## 2020-07-01 VITALS — SYSTOLIC BLOOD PRESSURE: 107 MMHG | DIASTOLIC BLOOD PRESSURE: 58 MMHG

## 2020-07-01 VITALS — DIASTOLIC BLOOD PRESSURE: 52 MMHG | SYSTOLIC BLOOD PRESSURE: 103 MMHG

## 2020-07-01 VITALS — SYSTOLIC BLOOD PRESSURE: 103 MMHG | DIASTOLIC BLOOD PRESSURE: 58 MMHG

## 2020-07-01 VITALS — SYSTOLIC BLOOD PRESSURE: 123 MMHG | DIASTOLIC BLOOD PRESSURE: 64 MMHG

## 2020-07-01 VITALS — DIASTOLIC BLOOD PRESSURE: 47 MMHG | SYSTOLIC BLOOD PRESSURE: 107 MMHG

## 2020-07-01 VITALS — DIASTOLIC BLOOD PRESSURE: 64 MMHG | SYSTOLIC BLOOD PRESSURE: 121 MMHG

## 2020-07-01 VITALS — SYSTOLIC BLOOD PRESSURE: 108 MMHG | DIASTOLIC BLOOD PRESSURE: 48 MMHG

## 2020-07-01 VITALS — DIASTOLIC BLOOD PRESSURE: 59 MMHG | SYSTOLIC BLOOD PRESSURE: 117 MMHG

## 2020-07-01 VITALS — SYSTOLIC BLOOD PRESSURE: 108 MMHG | DIASTOLIC BLOOD PRESSURE: 58 MMHG

## 2020-07-01 VITALS — SYSTOLIC BLOOD PRESSURE: 114 MMHG | DIASTOLIC BLOOD PRESSURE: 62 MMHG

## 2020-07-01 VITALS — SYSTOLIC BLOOD PRESSURE: 90 MMHG | DIASTOLIC BLOOD PRESSURE: 46 MMHG

## 2020-07-01 VITALS — DIASTOLIC BLOOD PRESSURE: 57 MMHG | SYSTOLIC BLOOD PRESSURE: 113 MMHG

## 2020-07-01 VITALS — DIASTOLIC BLOOD PRESSURE: 55 MMHG | SYSTOLIC BLOOD PRESSURE: 111 MMHG

## 2020-07-01 VITALS — SYSTOLIC BLOOD PRESSURE: 129 MMHG | DIASTOLIC BLOOD PRESSURE: 66 MMHG

## 2020-07-01 VITALS — SYSTOLIC BLOOD PRESSURE: 127 MMHG | DIASTOLIC BLOOD PRESSURE: 64 MMHG

## 2020-07-01 VITALS — DIASTOLIC BLOOD PRESSURE: 50 MMHG | SYSTOLIC BLOOD PRESSURE: 109 MMHG

## 2020-07-01 VITALS — SYSTOLIC BLOOD PRESSURE: 82 MMHG | DIASTOLIC BLOOD PRESSURE: 52 MMHG

## 2020-07-01 VITALS — SYSTOLIC BLOOD PRESSURE: 123 MMHG | DIASTOLIC BLOOD PRESSURE: 48 MMHG

## 2020-07-01 VITALS — SYSTOLIC BLOOD PRESSURE: 108 MMHG | DIASTOLIC BLOOD PRESSURE: 53 MMHG

## 2020-07-01 VITALS — SYSTOLIC BLOOD PRESSURE: 124 MMHG | DIASTOLIC BLOOD PRESSURE: 55 MMHG

## 2020-07-01 VITALS — SYSTOLIC BLOOD PRESSURE: 121 MMHG | DIASTOLIC BLOOD PRESSURE: 60 MMHG

## 2020-07-01 VITALS — SYSTOLIC BLOOD PRESSURE: 101 MMHG | DIASTOLIC BLOOD PRESSURE: 48 MMHG

## 2020-07-01 VITALS — SYSTOLIC BLOOD PRESSURE: 109 MMHG | DIASTOLIC BLOOD PRESSURE: 64 MMHG

## 2020-07-01 VITALS — DIASTOLIC BLOOD PRESSURE: 55 MMHG | SYSTOLIC BLOOD PRESSURE: 119 MMHG

## 2020-07-01 VITALS — DIASTOLIC BLOOD PRESSURE: 51 MMHG | SYSTOLIC BLOOD PRESSURE: 107 MMHG

## 2020-07-01 VITALS — SYSTOLIC BLOOD PRESSURE: 103 MMHG | DIASTOLIC BLOOD PRESSURE: 57 MMHG

## 2020-07-01 VITALS — SYSTOLIC BLOOD PRESSURE: 106 MMHG | DIASTOLIC BLOOD PRESSURE: 54 MMHG

## 2020-07-01 VITALS — DIASTOLIC BLOOD PRESSURE: 54 MMHG | SYSTOLIC BLOOD PRESSURE: 124 MMHG

## 2020-07-01 VITALS — DIASTOLIC BLOOD PRESSURE: 56 MMHG | SYSTOLIC BLOOD PRESSURE: 124 MMHG

## 2020-07-01 VITALS — DIASTOLIC BLOOD PRESSURE: 61 MMHG | SYSTOLIC BLOOD PRESSURE: 124 MMHG

## 2020-07-01 VITALS — SYSTOLIC BLOOD PRESSURE: 130 MMHG | DIASTOLIC BLOOD PRESSURE: 56 MMHG

## 2020-07-01 VITALS — SYSTOLIC BLOOD PRESSURE: 104 MMHG | DIASTOLIC BLOOD PRESSURE: 49 MMHG

## 2020-07-01 VITALS — SYSTOLIC BLOOD PRESSURE: 103 MMHG | DIASTOLIC BLOOD PRESSURE: 51 MMHG

## 2020-07-01 VITALS — DIASTOLIC BLOOD PRESSURE: 48 MMHG | SYSTOLIC BLOOD PRESSURE: 123 MMHG

## 2020-07-01 VITALS — DIASTOLIC BLOOD PRESSURE: 56 MMHG | SYSTOLIC BLOOD PRESSURE: 126 MMHG

## 2020-07-01 VITALS — DIASTOLIC BLOOD PRESSURE: 62 MMHG | SYSTOLIC BLOOD PRESSURE: 114 MMHG

## 2020-07-01 VITALS — SYSTOLIC BLOOD PRESSURE: 103 MMHG | DIASTOLIC BLOOD PRESSURE: 56 MMHG

## 2020-07-01 VITALS — DIASTOLIC BLOOD PRESSURE: 46 MMHG | SYSTOLIC BLOOD PRESSURE: 101 MMHG

## 2020-07-01 VITALS — DIASTOLIC BLOOD PRESSURE: 50 MMHG | SYSTOLIC BLOOD PRESSURE: 110 MMHG

## 2020-07-01 VITALS — SYSTOLIC BLOOD PRESSURE: 120 MMHG | DIASTOLIC BLOOD PRESSURE: 65 MMHG

## 2020-07-01 VITALS — DIASTOLIC BLOOD PRESSURE: 57 MMHG | SYSTOLIC BLOOD PRESSURE: 109 MMHG

## 2020-07-01 VITALS — DIASTOLIC BLOOD PRESSURE: 54 MMHG | SYSTOLIC BLOOD PRESSURE: 99 MMHG

## 2020-07-01 VITALS — DIASTOLIC BLOOD PRESSURE: 62 MMHG | SYSTOLIC BLOOD PRESSURE: 126 MMHG

## 2020-07-01 VITALS — DIASTOLIC BLOOD PRESSURE: 57 MMHG | SYSTOLIC BLOOD PRESSURE: 108 MMHG

## 2020-07-01 VITALS — SYSTOLIC BLOOD PRESSURE: 127 MMHG | DIASTOLIC BLOOD PRESSURE: 56 MMHG

## 2020-07-01 VITALS — DIASTOLIC BLOOD PRESSURE: 55 MMHG | SYSTOLIC BLOOD PRESSURE: 123 MMHG

## 2020-07-01 VITALS — DIASTOLIC BLOOD PRESSURE: 63 MMHG | SYSTOLIC BLOOD PRESSURE: 130 MMHG

## 2020-07-01 VITALS — DIASTOLIC BLOOD PRESSURE: 58 MMHG | SYSTOLIC BLOOD PRESSURE: 114 MMHG

## 2020-07-01 VITALS — DIASTOLIC BLOOD PRESSURE: 55 MMHG | SYSTOLIC BLOOD PRESSURE: 118 MMHG

## 2020-07-01 VITALS — SYSTOLIC BLOOD PRESSURE: 117 MMHG | DIASTOLIC BLOOD PRESSURE: 60 MMHG

## 2020-07-01 VITALS — DIASTOLIC BLOOD PRESSURE: 68 MMHG | SYSTOLIC BLOOD PRESSURE: 128 MMHG

## 2020-07-01 VITALS — DIASTOLIC BLOOD PRESSURE: 55 MMHG | SYSTOLIC BLOOD PRESSURE: 125 MMHG

## 2020-07-01 VITALS — DIASTOLIC BLOOD PRESSURE: 59 MMHG | SYSTOLIC BLOOD PRESSURE: 116 MMHG

## 2020-07-01 VITALS — SYSTOLIC BLOOD PRESSURE: 100 MMHG | DIASTOLIC BLOOD PRESSURE: 64 MMHG

## 2020-07-01 VITALS — DIASTOLIC BLOOD PRESSURE: 59 MMHG | SYSTOLIC BLOOD PRESSURE: 98 MMHG

## 2020-07-01 VITALS — DIASTOLIC BLOOD PRESSURE: 60 MMHG | SYSTOLIC BLOOD PRESSURE: 124 MMHG

## 2020-07-01 VITALS — DIASTOLIC BLOOD PRESSURE: 40 MMHG | SYSTOLIC BLOOD PRESSURE: 123 MMHG

## 2020-07-01 VITALS — SYSTOLIC BLOOD PRESSURE: 124 MMHG | DIASTOLIC BLOOD PRESSURE: 67 MMHG

## 2020-07-01 VITALS — DIASTOLIC BLOOD PRESSURE: 66 MMHG | SYSTOLIC BLOOD PRESSURE: 127 MMHG

## 2020-07-01 VITALS — SYSTOLIC BLOOD PRESSURE: 120 MMHG | DIASTOLIC BLOOD PRESSURE: 62 MMHG

## 2020-07-01 VITALS — DIASTOLIC BLOOD PRESSURE: 63 MMHG | SYSTOLIC BLOOD PRESSURE: 136 MMHG

## 2020-07-01 VITALS — DIASTOLIC BLOOD PRESSURE: 49 MMHG | SYSTOLIC BLOOD PRESSURE: 102 MMHG

## 2020-07-01 VITALS — DIASTOLIC BLOOD PRESSURE: 57 MMHG | SYSTOLIC BLOOD PRESSURE: 110 MMHG

## 2020-07-01 VITALS — SYSTOLIC BLOOD PRESSURE: 136 MMHG | DIASTOLIC BLOOD PRESSURE: 63 MMHG

## 2020-07-01 VITALS — DIASTOLIC BLOOD PRESSURE: 69 MMHG | SYSTOLIC BLOOD PRESSURE: 137 MMHG

## 2020-07-01 VITALS — SYSTOLIC BLOOD PRESSURE: 122 MMHG | DIASTOLIC BLOOD PRESSURE: 69 MMHG

## 2020-07-01 VITALS — DIASTOLIC BLOOD PRESSURE: 53 MMHG | SYSTOLIC BLOOD PRESSURE: 109 MMHG

## 2020-07-01 VITALS — SYSTOLIC BLOOD PRESSURE: 119 MMHG | DIASTOLIC BLOOD PRESSURE: 58 MMHG

## 2020-07-01 VITALS — SYSTOLIC BLOOD PRESSURE: 109 MMHG | DIASTOLIC BLOOD PRESSURE: 50 MMHG

## 2020-07-01 VITALS — SYSTOLIC BLOOD PRESSURE: 127 MMHG | DIASTOLIC BLOOD PRESSURE: 85 MMHG

## 2020-07-01 VITALS — SYSTOLIC BLOOD PRESSURE: 111 MMHG | DIASTOLIC BLOOD PRESSURE: 52 MMHG

## 2020-07-01 VITALS — DIASTOLIC BLOOD PRESSURE: 54 MMHG | SYSTOLIC BLOOD PRESSURE: 112 MMHG

## 2020-07-01 VITALS — DIASTOLIC BLOOD PRESSURE: 77 MMHG | SYSTOLIC BLOOD PRESSURE: 129 MMHG

## 2020-07-01 VITALS — DIASTOLIC BLOOD PRESSURE: 53 MMHG | SYSTOLIC BLOOD PRESSURE: 111 MMHG

## 2020-07-01 VITALS — DIASTOLIC BLOOD PRESSURE: 56 MMHG | SYSTOLIC BLOOD PRESSURE: 110 MMHG

## 2020-07-01 VITALS — SYSTOLIC BLOOD PRESSURE: 117 MMHG | DIASTOLIC BLOOD PRESSURE: 57 MMHG

## 2020-07-01 VITALS — SYSTOLIC BLOOD PRESSURE: 107 MMHG | DIASTOLIC BLOOD PRESSURE: 56 MMHG

## 2020-07-01 VITALS — SYSTOLIC BLOOD PRESSURE: 125 MMHG | DIASTOLIC BLOOD PRESSURE: 59 MMHG

## 2020-07-01 LAB
ALBUMIN SERPL-MCNC: 1.4 G/DL (ref 3.4–5)
ALP SERPL-CCNC: 52 U/L (ref 45–117)
ALT SERPL-CCNC: 16 U/L (ref 13–56)
ANION GAP SERPL CALCULATED.3IONS-SCNC: 6 MMOL/L (ref 5–15)
BILIRUB SERPL-MCNC: 0.9 MG/DL (ref 0.2–1)
BUN SERPL-MCNC: 15 MG/DL (ref 7–18)
BUN/CREAT SERPL: 39.5
CALCIUM SERPL-MCNC: 7.8 MG/DL (ref 8.5–10.1)
CHLORIDE SERPL-SCNC: 101 MMOL/L (ref 98–107)
CO2 SERPL-SCNC: 32 MMOL/L (ref 21–32)
GLUCOSE SERPL-MCNC: 103 MG/DL (ref 74–106)
MAGNESIUM SERPL-MCNC: 1.8 MG/DL (ref 1.6–2.6)
POTASSIUM SERPL-SCNC: 3.1 MMOL/L (ref 3.5–5.1)
PROT SERPL-MCNC: 4.7 G/DL (ref 6.4–8.2)
SODIUM SERPL-SCNC: 139 MMOL/L (ref 136–145)

## 2020-07-01 RX ADMIN — VANCOMYCIN HYDROCHLORIDE SCH MLS/HR: 1 INJECTION, POWDER, LYOPHILIZED, FOR SOLUTION INTRAVENOUS at 20:30

## 2020-07-01 RX ADMIN — POTASSIUM CHLORIDE SCH MLS/HR: 200 INJECTION, SOLUTION INTRAVENOUS at 14:49

## 2020-07-01 RX ADMIN — MEROPENEM SCH MLS/HR: 1 INJECTION, POWDER, FOR SOLUTION INTRAVENOUS at 09:07

## 2020-07-01 RX ADMIN — POTASSIUM CHLORIDE SCH MLS/HR: 200 INJECTION, SOLUTION INTRAVENOUS at 12:29

## 2020-07-01 RX ADMIN — Medication SCH STRIP: at 09:00

## 2020-07-01 RX ADMIN — MUPIROCIN SCH APPLIC: 20 OINTMENT TOPICAL at 09:07

## 2020-07-01 RX ADMIN — PANTOPRAZOLE SODIUM SCH MG: 40 INJECTION, POWDER, FOR SOLUTION INTRAVENOUS at 09:07

## 2020-07-01 RX ADMIN — HUMAN INSULIN SCH UNITS: 100 INJECTION, SOLUTION SUBCUTANEOUS at 09:00

## 2020-07-01 RX ADMIN — Medication SCH STRIP: at 18:25

## 2020-07-01 RX ADMIN — FENTANYL CITRATE SCH MLS/HR: 50 INJECTION, SOLUTION INTRAMUSCULAR; INTRAVENOUS at 09:26

## 2020-07-01 RX ADMIN — HUMAN INSULIN SCH UNITS: 100 INJECTION, SOLUTION SUBCUTANEOUS at 18:00

## 2020-07-01 RX ADMIN — Medication SCH STRIP: at 06:00

## 2020-07-01 RX ADMIN — POTASSIUM CHLORIDE SCH MLS/HR: 200 INJECTION, SOLUTION INTRAVENOUS at 14:50

## 2020-07-01 RX ADMIN — DOPAMINE HYDROCHLORIDE IN DEXTROSE SCH MLS/HR: 1.6 INJECTION, SOLUTION INTRAVENOUS at 19:15

## 2020-07-01 RX ADMIN — PANTOPRAZOLE SODIUM SCH MG: 40 INJECTION, POWDER, FOR SOLUTION INTRAVENOUS at 21:52

## 2020-07-01 RX ADMIN — MEROPENEM SCH MLS/HR: 1 INJECTION, POWDER, FOR SOLUTION INTRAVENOUS at 21:52

## 2020-07-01 RX ADMIN — DOPAMINE HYDROCHLORIDE IN DEXTROSE SCH MLS/HR: 1.6 INJECTION, SOLUTION INTRAVENOUS at 09:00

## 2020-07-01 RX ADMIN — MIDAZOLAM SCH MLS/HR: 5 INJECTION, SOLUTION INTRAMUSCULAR; INTRAVENOUS at 14:47

## 2020-07-01 RX ADMIN — PHENYLEPHRINE HYDROCHLORIDE SCH MLS/HR: 10 INJECTION INTRAVENOUS at 09:00

## 2020-07-01 RX ADMIN — HUMAN INSULIN SCH UNITS: 100 INJECTION, SOLUTION SUBCUTANEOUS at 06:00

## 2020-07-01 RX ADMIN — FUROSEMIDE SCH MG: 10 INJECTION, SOLUTION INTRAMUSCULAR; INTRAVENOUS at 09:07

## 2020-07-01 RX ADMIN — VANCOMYCIN HYDROCHLORIDE SCH MLS/HR: 1 INJECTION, POWDER, LYOPHILIZED, FOR SOLUTION INTRAVENOUS at 08:56

## 2020-07-01 RX ADMIN — FUROSEMIDE SCH MG: 10 INJECTION, SOLUTION INTRAMUSCULAR; INTRAVENOUS at 21:52

## 2020-07-01 RX ADMIN — PHENYLEPHRINE HYDROCHLORIDE SCH MLS/HR: 10 INJECTION INTRAVENOUS at 16:08

## 2020-07-01 NOTE — NUR
WOUND CARE NOTE: IN TO SEE PATIENT FOR SKIN INTEGRITY AT THIS TIME. PATIENT CONTINUES TO BE 
INTUBATED, SEDATED.  SHE IS WOUND FREE AT THIS TIME.  PATIENT DOES HAVE GENERALIZED EDEMA, 
ESPECIALLY TO BLE.  BOTH LEGS ELEVATED UP ONTO PILLOWS FOR EDEMA CONTROL.  REPOSITIONED 
PATIENT ONTO LEFT SIDE, REDISTRIBUTING PRESSURE POINTS WITH PILLOWS. SKIN/WOUND CARE PLAN 
UPDATED.  



RECOMMEND: CONTINUATION WITH ALL WOUND CARE ORDERS AS PREVIOUSLY PRESCRIBED BY MD.  WOUND 
CARE TEAM WILL CONTINUE TO MONITOR.

## 2020-07-01 NOTE — NUR
Respiratory note:

PER DR. CAMEJO PT PLACED ON CPAP TRIAL. PT NOT FOLLOWING COMMANDS BUT ALL SEDATION IS 
OFF. CPAP 5, PSV 8 AND FI02 30%. WILL MONIOTR PT CLOSELY. PT TOLERATING WELL.

## 2020-07-01 NOTE — NUR
Respiratory note:

PT ABG RESULTS ON CPAP TRIAL FOR 1 HOUR CALLED INTO DR. CAMEJO. MESSAGE LEFT. PT COULD 
NOT DO NIF, VC. PT WOULD BLINK FOR ME.

## 2020-07-01 NOTE — NUR
Opening Shift Note

Assumed care of patient, sedated and intubated. ET tube is size 8 and 22 cm from lip. 
Patient is on SIMV. Ventilation orders match MD orders. IVs infusing per MD order. Rhythm is 
sinus with occasional PVCs.  No S/S of distress/SOB or pain. Bed is in lowest position and 
locked. Call light within reach. Board updated. Instructed on POC and to call for assist 
PRN, will continue to monitor for changes Q1hr and PRN.

## 2020-07-01 NOTE — NUR
Spoke to MD Andrews and notified him that patient's potassium has been trending downward and 
most current potassium level, which is currently 3.1. Previous potassium level was 3.3. 
Patient is receiving Lasix. Orders received: 1) BMP for tomorrow AM, 2) CBC for tomorrow AM, 
3) Potassium Chloride Turner 20 mEq IV x 3 for a total of 60 mEq, 4) Communication order: 
"Per MD Andrews, please give Potassium Chloride Turner 20 mEq IV x 2 if potassium serum level 
drops below 3.6."

## 2020-07-01 NOTE — NUR
Closing note

Patient still sedated, but does not appear to be in distress. Patient is currently on CPAP. 
Care endorsed to Hanh.

## 2020-07-01 NOTE — NUR
Respiratory note:

DR. CAMEJO  CALLED BACK. ORDERS TO KEEP PT ON CPAP AS LONG AS PT IS COMFORTABLE. DO 
WEANING PARAMETERS IN 3-4 HOURS.

## 2020-07-01 NOTE — NUR
Patient is not on any sedation currently. Patient is arousable to light pain stimuli and 
shaking. 

-------------------------------------------------------------------------------

Addendum: 07/01/20 at 1046 by CARLOS EDWARDS RN

-------------------------------------------------------------------------------

Amended: Links added.

## 2020-07-01 NOTE — NUR
Performed Accucheck as scheduled at 1200. Blood glucose level is 112. Medication had already 
been non-adminstered by another RN so I can not document it. No insulin needed. Will 
continue to monitor.

## 2020-07-01 NOTE — NUR
RN MADE AWARE OF SMALL SORE ON PT'S LOWER LIP, IN THE CENTER RIGHT LOCATION. ET TUBE 
POSITIONED AS TO NOT REST ON SORE, WILL CONTINUE TO MONITOR.

## 2020-07-01 NOTE — NUR
Respiratory note:

DR. CAMEJO NOTIFIED OF CPAP PARAMETERS. ORDERED TO PLACE PT BACK ON SIMV SETTINGS FOR 
TONIGHT, REPEAT CPAP AGAIN TOMORROW. WILL NOTIFY RN AND CONTINUE TO MONITOR PT.

## 2020-07-01 NOTE — NUR
OPEN

ASSUMED CARE OF FEMALE PT ORALLY INTUBATED.  PT GRIMACES AND WITHDRAWS TO TACTILE STIMULI. 
OTHERWISE NON RESPONSIVE. SINUS TACH ON CARDIAC MONITOR. D5 0.45 NS INFUSING AT 30 ML/HR. PT 
WITH R. IJ TLC ALL PORTS PATENT. DRESSING CDI. TLC TO R. FEMORAL. ALL PORTS PATENT. DRESSING 
CDI. NGT TO L. NARE CLAMPED. PLACEMENT VERIFIED. ZAYAS TO GRAVITY DRAINING CLEAR LIGHT SKINNY 
URINE. SHAWN SCD'S IN PLACE. NO SKIN BREAKDOWN OBSERVED. PT WITH MALFORMED FINGERS AND THUMBS. 
HX OF ARTHRITIS. BED IN LOWEST LOCKED POSITION. PILLOWS USED TO OFFLOAD BONY PROMINENCES AND 
SHAWN HEELS. HOB ELEVATED 30 DEGREES. ORAL CARE PROVIDED. PT IN FULL VIEW OF RN STATION. WILL 
CONTINUE TO MONITOR.

## 2020-07-01 NOTE — NUR
Respiratory note:

ATTEMPTED TO OBTAIN WEANING PARAMETERS, PT DOES NOT OPEN EYES OR FOLLOW COMMANDS. WAS ABLE 
TO OBTAIN A MIP OF -20.4 FROM PT'S SPONTANEOUS EFFORT WITHOUT FOLLOWING COACHING 
INSTRUCTIONS, VITAL CAPACITY OF 347mL OBTAINED IN THE SAME MANNER. PT SUCTIONED FOR LARGE 
THICK TAN/CLEAR SECRETIONS X3. GAG REFLEX NOTED, PT ABLE TO COUGH UP SECRETIONS INTO ET 
TUBE. WILL CALL DR. CAMEJO WITH PARAMETERS AND PT UPDATE, RN AWARE.

## 2020-07-02 VITALS — SYSTOLIC BLOOD PRESSURE: 121 MMHG | DIASTOLIC BLOOD PRESSURE: 60 MMHG

## 2020-07-02 VITALS — DIASTOLIC BLOOD PRESSURE: 72 MMHG | SYSTOLIC BLOOD PRESSURE: 124 MMHG

## 2020-07-02 VITALS — SYSTOLIC BLOOD PRESSURE: 136 MMHG | DIASTOLIC BLOOD PRESSURE: 55 MMHG

## 2020-07-02 VITALS — SYSTOLIC BLOOD PRESSURE: 142 MMHG | DIASTOLIC BLOOD PRESSURE: 65 MMHG

## 2020-07-02 VITALS — SYSTOLIC BLOOD PRESSURE: 122 MMHG | DIASTOLIC BLOOD PRESSURE: 60 MMHG

## 2020-07-02 VITALS — DIASTOLIC BLOOD PRESSURE: 61 MMHG | SYSTOLIC BLOOD PRESSURE: 132 MMHG

## 2020-07-02 VITALS — SYSTOLIC BLOOD PRESSURE: 137 MMHG | DIASTOLIC BLOOD PRESSURE: 73 MMHG

## 2020-07-02 VITALS — SYSTOLIC BLOOD PRESSURE: 127 MMHG | DIASTOLIC BLOOD PRESSURE: 64 MMHG

## 2020-07-02 VITALS — DIASTOLIC BLOOD PRESSURE: 66 MMHG | SYSTOLIC BLOOD PRESSURE: 129 MMHG

## 2020-07-02 VITALS — DIASTOLIC BLOOD PRESSURE: 72 MMHG | SYSTOLIC BLOOD PRESSURE: 140 MMHG

## 2020-07-02 VITALS — SYSTOLIC BLOOD PRESSURE: 133 MMHG | DIASTOLIC BLOOD PRESSURE: 63 MMHG

## 2020-07-02 VITALS — DIASTOLIC BLOOD PRESSURE: 54 MMHG | SYSTOLIC BLOOD PRESSURE: 121 MMHG

## 2020-07-02 VITALS — SYSTOLIC BLOOD PRESSURE: 153 MMHG | DIASTOLIC BLOOD PRESSURE: 81 MMHG

## 2020-07-02 VITALS — SYSTOLIC BLOOD PRESSURE: 132 MMHG | DIASTOLIC BLOOD PRESSURE: 66 MMHG

## 2020-07-02 VITALS — DIASTOLIC BLOOD PRESSURE: 55 MMHG | SYSTOLIC BLOOD PRESSURE: 136 MMHG

## 2020-07-02 VITALS — SYSTOLIC BLOOD PRESSURE: 166 MMHG | DIASTOLIC BLOOD PRESSURE: 88 MMHG

## 2020-07-02 VITALS — SYSTOLIC BLOOD PRESSURE: 115 MMHG | DIASTOLIC BLOOD PRESSURE: 45 MMHG

## 2020-07-02 VITALS — SYSTOLIC BLOOD PRESSURE: 123 MMHG | DIASTOLIC BLOOD PRESSURE: 74 MMHG

## 2020-07-02 VITALS — SYSTOLIC BLOOD PRESSURE: 129 MMHG | DIASTOLIC BLOOD PRESSURE: 61 MMHG

## 2020-07-02 VITALS — DIASTOLIC BLOOD PRESSURE: 47 MMHG | SYSTOLIC BLOOD PRESSURE: 121 MMHG

## 2020-07-02 VITALS — SYSTOLIC BLOOD PRESSURE: 126 MMHG | DIASTOLIC BLOOD PRESSURE: 67 MMHG

## 2020-07-02 VITALS — SYSTOLIC BLOOD PRESSURE: 131 MMHG | DIASTOLIC BLOOD PRESSURE: 57 MMHG

## 2020-07-02 VITALS — SYSTOLIC BLOOD PRESSURE: 109 MMHG | DIASTOLIC BLOOD PRESSURE: 64 MMHG

## 2020-07-02 VITALS — SYSTOLIC BLOOD PRESSURE: 122 MMHG | DIASTOLIC BLOOD PRESSURE: 55 MMHG

## 2020-07-02 VITALS — SYSTOLIC BLOOD PRESSURE: 128 MMHG | DIASTOLIC BLOOD PRESSURE: 59 MMHG

## 2020-07-02 VITALS — SYSTOLIC BLOOD PRESSURE: 100 MMHG | DIASTOLIC BLOOD PRESSURE: 51 MMHG

## 2020-07-02 VITALS — DIASTOLIC BLOOD PRESSURE: 68 MMHG | SYSTOLIC BLOOD PRESSURE: 127 MMHG

## 2020-07-02 VITALS — SYSTOLIC BLOOD PRESSURE: 126 MMHG | DIASTOLIC BLOOD PRESSURE: 63 MMHG

## 2020-07-02 VITALS — SYSTOLIC BLOOD PRESSURE: 128 MMHG | DIASTOLIC BLOOD PRESSURE: 68 MMHG

## 2020-07-02 VITALS — SYSTOLIC BLOOD PRESSURE: 118 MMHG | DIASTOLIC BLOOD PRESSURE: 64 MMHG

## 2020-07-02 VITALS — DIASTOLIC BLOOD PRESSURE: 63 MMHG | SYSTOLIC BLOOD PRESSURE: 126 MMHG

## 2020-07-02 VITALS — DIASTOLIC BLOOD PRESSURE: 76 MMHG | SYSTOLIC BLOOD PRESSURE: 138 MMHG

## 2020-07-02 VITALS — DIASTOLIC BLOOD PRESSURE: 66 MMHG | SYSTOLIC BLOOD PRESSURE: 132 MMHG

## 2020-07-02 VITALS — DIASTOLIC BLOOD PRESSURE: 63 MMHG | SYSTOLIC BLOOD PRESSURE: 131 MMHG

## 2020-07-02 VITALS — DIASTOLIC BLOOD PRESSURE: 45 MMHG | SYSTOLIC BLOOD PRESSURE: 115 MMHG

## 2020-07-02 VITALS — SYSTOLIC BLOOD PRESSURE: 105 MMHG | DIASTOLIC BLOOD PRESSURE: 45 MMHG

## 2020-07-02 VITALS — DIASTOLIC BLOOD PRESSURE: 53 MMHG | SYSTOLIC BLOOD PRESSURE: 119 MMHG

## 2020-07-02 VITALS — DIASTOLIC BLOOD PRESSURE: 51 MMHG | SYSTOLIC BLOOD PRESSURE: 100 MMHG

## 2020-07-02 VITALS — SYSTOLIC BLOOD PRESSURE: 119 MMHG | DIASTOLIC BLOOD PRESSURE: 53 MMHG

## 2020-07-02 VITALS — DIASTOLIC BLOOD PRESSURE: 64 MMHG | SYSTOLIC BLOOD PRESSURE: 127 MMHG

## 2020-07-02 VITALS — DIASTOLIC BLOOD PRESSURE: 65 MMHG | SYSTOLIC BLOOD PRESSURE: 142 MMHG

## 2020-07-02 VITALS — SYSTOLIC BLOOD PRESSURE: 140 MMHG | DIASTOLIC BLOOD PRESSURE: 72 MMHG

## 2020-07-02 VITALS — SYSTOLIC BLOOD PRESSURE: 134 MMHG | DIASTOLIC BLOOD PRESSURE: 70 MMHG

## 2020-07-02 LAB
ANION GAP SERPL CALCULATED.3IONS-SCNC: 4 MMOL/L (ref 5–15)
BUN SERPL-MCNC: 17 MG/DL (ref 7–18)
BUN/CREAT SERPL: 39.5
CALCIUM SERPL-MCNC: 8.3 MG/DL (ref 8.5–10.1)
CHLORIDE SERPL-SCNC: 101 MMOL/L (ref 98–107)
CO2 SERPL-SCNC: 34 MMOL/L (ref 21–32)
GLUCOSE SERPL-MCNC: 104 MG/DL (ref 74–106)
HCT VFR BLD AUTO: 28.7 % (ref 36–46)
HGB BLD-MCNC: 9.4 G/DL (ref 12.2–16.2)
MCH RBC QN AUTO: 26.2 PG (ref 28–32)
MCV RBC AUTO: 79.8 FL (ref 80–100)
NRBC BLD QL AUTO: 0 %
POTASSIUM SERPL-SCNC: 3.7 MMOL/L (ref 3.5–5.1)
SODIUM SERPL-SCNC: 139 MMOL/L (ref 136–145)

## 2020-07-02 RX ADMIN — FENTANYL CITRATE SCH MLS/HR: 50 INJECTION, SOLUTION INTRAMUSCULAR; INTRAVENOUS at 23:13

## 2020-07-02 RX ADMIN — PHENYLEPHRINE HYDROCHLORIDE SCH MLS/HR: 10 INJECTION INTRAVENOUS at 08:48

## 2020-07-02 RX ADMIN — VANCOMYCIN HYDROCHLORIDE SCH MLS/HR: 1 INJECTION, POWDER, LYOPHILIZED, FOR SOLUTION INTRAVENOUS at 20:22

## 2020-07-02 RX ADMIN — DEXTROSE AND SODIUM CHLORIDE SCH MLS/HR: 5; 450 INJECTION, SOLUTION INTRAVENOUS at 03:52

## 2020-07-02 RX ADMIN — MEROPENEM SCH MLS/HR: 1 INJECTION, POWDER, FOR SOLUTION INTRAVENOUS at 10:00

## 2020-07-02 RX ADMIN — PANTOPRAZOLE SODIUM SCH MG: 40 INJECTION, POWDER, FOR SOLUTION INTRAVENOUS at 09:53

## 2020-07-02 RX ADMIN — PHENYLEPHRINE HYDROCHLORIDE SCH MLS/HR: 10 INJECTION INTRAVENOUS at 00:28

## 2020-07-02 RX ADMIN — FUROSEMIDE SCH MG: 10 INJECTION, SOLUTION INTRAMUSCULAR; INTRAVENOUS at 21:45

## 2020-07-02 RX ADMIN — MIDAZOLAM SCH MLS/HR: 5 INJECTION, SOLUTION INTRAMUSCULAR; INTRAVENOUS at 14:47

## 2020-07-02 RX ADMIN — HUMAN INSULIN SCH UNITS: 100 INJECTION, SOLUTION SUBCUTANEOUS at 06:00

## 2020-07-02 RX ADMIN — MEROPENEM SCH MLS/HR: 1 INJECTION, POWDER, FOR SOLUTION INTRAVENOUS at 21:45

## 2020-07-02 RX ADMIN — HUMAN INSULIN SCH UNITS: 100 INJECTION, SOLUTION SUBCUTANEOUS at 23:52

## 2020-07-02 RX ADMIN — HUMAN INSULIN SCH UNITS: 100 INJECTION, SOLUTION SUBCUTANEOUS at 11:58

## 2020-07-02 RX ADMIN — Medication SCH STRIP: at 06:28

## 2020-07-02 RX ADMIN — VANCOMYCIN HYDROCHLORIDE SCH MLS/HR: 1 INJECTION, POWDER, LYOPHILIZED, FOR SOLUTION INTRAVENOUS at 08:00

## 2020-07-02 RX ADMIN — HUMAN INSULIN SCH UNITS: 100 INJECTION, SOLUTION SUBCUTANEOUS at 00:40

## 2020-07-02 RX ADMIN — PANTOPRAZOLE SODIUM SCH MG: 40 INJECTION, POWDER, FOR SOLUTION INTRAVENOUS at 21:45

## 2020-07-02 RX ADMIN — Medication SCH STRIP: at 23:52

## 2020-07-02 RX ADMIN — FENTANYL CITRATE SCH MLS/HR: 50 INJECTION, SOLUTION INTRAMUSCULAR; INTRAVENOUS at 09:26

## 2020-07-02 RX ADMIN — Medication SCH STRIP: at 00:40

## 2020-07-02 RX ADMIN — PHENYLEPHRINE HYDROCHLORIDE SCH MLS/HR: 10 INJECTION INTRAVENOUS at 17:08

## 2020-07-02 RX ADMIN — DEXTROSE AND SODIUM CHLORIDE SCH MLS/HR: 5; 450 INJECTION, SOLUTION INTRAVENOUS at 21:07

## 2020-07-02 RX ADMIN — Medication SCH STRIP: at 11:58

## 2020-07-02 RX ADMIN — DOPAMINE HYDROCHLORIDE IN DEXTROSE SCH MLS/HR: 1.6 INJECTION, SOLUTION INTRAVENOUS at 11:35

## 2020-07-02 RX ADMIN — FUROSEMIDE SCH MG: 10 INJECTION, SOLUTION INTRAMUSCULAR; INTRAVENOUS at 09:53

## 2020-07-02 RX ADMIN — NOREPINEPHRINE BITARTRATE SCH MLS/HR: 1 INJECTION, SOLUTION, CONCENTRATE INTRAVENOUS at 15:15

## 2020-07-02 NOTE — NUR
NEURO/ TEACHING

Polish SPEAKING RN ASSIST WITH INTERPRETING. PT OPENED EYES TO COMMAND SPOKEN IN Polish. 
PT TRACKING RN. PT ATTEMPTING TO SPEAK AROUND ETT. ORIENTED PT REGARDING HOSPITALIZATION AND 
PLAN TO CPAP IN AM.

## 2020-07-02 NOTE — NUR
RT NOTE

RECEIVED PT INTUBATED AND ON VENT ADQ 0236 ON STATED SETTINGS. VENT IS PLUGGED TO RED 
OUTLET. ALARMS ARE  ON AND AUDIBLE TO NURSING. AMBU BAG AT BEDSIDE AND CONNECTED TO O2 
SOURCE. 8.0 ETT SECURED WITH ANCHORFAST AT 22 

CM TO THE ORAL LEFT. BS ARE COARSE. PT WAS SUCTIONED FOR SMALL CLEAR/WHITE RETURN FROM ETT 
AND LARGE RETURN ORALLY. CONT AS ORDERED. POX 99,  PT TEMP 99.3

-------------------------------------------------------------------------------

Addendum: 07/02/20 at 1914 by Ann Marie Marques RT

-------------------------------------------------------------------------------

Amended: Links added.

## 2020-07-02 NOTE — NUR
RESPIRATORY

RT Anny at bedside changed ventilator mode to CPAP, patient tolerating well at this time 
sating 100% but does not fully wake up and follow commands.

## 2020-07-02 NOTE — NUR
RT NOTE

ETT MOVED TO THE ORAL CENTER

-------------------------------------------------------------------------------

Addendum: 07/02/20 at 2214 by Ann Marie Marques RT

-------------------------------------------------------------------------------

Amended: Links added.

## 2020-07-02 NOTE — NUR
Patient bathe/linen change

Patient given complete bath. Skin integrity assessed for any changes. Linens changed. 
Patient repositioned for comfort. NEW OPTIFOAM GENTLE SACRAL DRESSING PLACED TO INTACT SKIN 
OF SACRUM/COCCYX AS PREVENTATIVE.

## 2020-07-02 NOTE — NUR
RESPIRATORY 

RT Anny at bedside and placed patient on SIMV of 6, Tidal Volume of 550, FIO2 30% and peep 
of 5. Patient sating 100% tolerating well. Will continue to monitor patient closely.

## 2020-07-02 NOTE — NUR
Nutrition Followup Notes



Wt: 89.4 kg



Pt`s intubated on CPAP when rounded this am per RN. pt is currently NPO with PN support @ 42 
ml/hr proding 240 kcals and 42.5 gm proteins. pt with inadequate PN support



Est. Energy Needs ABW 62 [id number] kcal (23-25 kcal/kg BW), Est. Protein Needs:  62-74 
gms/day (1.0-1.2 gms/kg ABW r/t hypoalb).Will continue to monitor and reassess prn.



LABS: CO2 34 H, CA 8.3 L  ALB 1.4 L.



GI: Pt with no BM reported per RN doc.



BS: 12 high risk. Please refer to wound assessment report for full details.



PES: 1) Altered nutrition related lab values r.t current chronic medical condition aeb 
severe hypoalb hypocalcemia

Impaired swallowing r.t current medical condition aeb pt`s intubated sedated with order of 
NPO



Comments 

1) advance diet as medically feasible. 

2) consider PN support to meet > 75% of needs if pt continues to be npo. 

3) continue current plan of care

## 2020-07-02 NOTE — NUR
RT NOTE

ROUTINE VENT CHECK DONE. PT INTUBATED AND ON VENT ADQ 0236 ON STATED SETTINGS. VENT IS 
PLUGGED TO RED OUTLET. ALARMS ARE  ON AND AUDIBLE TO NURSING. AMBU BAG AT BEDSIDE AND 
CONNECTED TO O2 SOURCE. 8.0 ETT SECURED WITH ANCHORFAST AT 22 CM TO THE ORAL LEFT. CONT AS 
ORDERED. %,  PT TEMP 99.3

-------------------------------------------------------------------------------

Addendum: 07/02/20 at 2020 by Ann Marie Marques RT

-------------------------------------------------------------------------------

Amended: Links added.

## 2020-07-02 NOTE — NUR
OPENING NOTE: 



INTUBATED AND OFF SEDATION. OPENS EYES. MOVES ALL EXTREMITIES BUT VERY WEAK. INTERMITTENTLY 
FOLLOWS COMMANDS. ATTEMPTING TO SPEAK OVER ETT. HITTING BED WITH HANDS. NSR-SINUS TACH, HR 
90-110s. -150s. 8.0 ETT, 22 AT THE LIP. SpO2>95% ON CURRENT VENT SETTINGS. LS CTA, 
EVEN AND UNLABORED BREATHING. MODERATE THICK ETT SECRETIONS. DOES NOT ALLOW WRITER TO 
PERFORM ORAL CARE. ABD SOFT. HYPOACTIVE BS. UNKNOWN LBM. ZAYAS INTACT, DRAINING SKINNY URINE. 
SKIN GROSSLY INTACT. RIGHT FEMORAL AND RIGHT IJ TLC, CDI, AND PATENT WITH BLOOD RETURN. 
DENIES PAIN AT THIS TIME. 



REINFORCED POC. MAINTAINED PATIENT SAFETY: BED LOCKED AND IN THE LOWEST POSITION, FREQUENT 
VISUAL CHECKS, BED ALARM ON. SOFT MITTENS ON FOR SAFETY.

## 2020-07-02 NOTE — NUR
AGITATED - STARTED ON FENTANYL GTT



PATIENT TACHYCARDIC, TACHYPNIC, HYPERTENSIVE, ATTEMPTING TO SPEAK OVER THE ETT, BANGING ARMS 
AGAINST THE BED. UNABLE TO REDIRECT. SOFT MITTENS ON FOR SAFETY.

## 2020-07-02 NOTE — NUR
RT NOTE

ROUTINE VENT CHECK DONE. PT INTUBATED AND ON VENT ADQ 0236 ON STATED SETTINGS. VENT IS 
PLUGGED TO RED OUTLET. ALARMS ARE  ON AND AUDIBLE TO NURSING. AMBU BAG AT BEDSIDE AND 
CONNECTED TO O2 SOURCE. 8.0 ETT SECURED 

WITH ANCHORFAST AT 22 CM TO THE ORAL LEFT. BS ARE COARSE THROUGHOUT. PT WAS SUCTIONED FOR 
LARGE RETURN. CONT AS ORDERED. %, PT TEMP 99.3

-------------------------------------------------------------------------------

Addendum: 07/02/20 at 2207 by Ann Marie Marques RT

-------------------------------------------------------------------------------

Amended: Links added.

## 2020-07-03 VITALS — DIASTOLIC BLOOD PRESSURE: 71 MMHG | SYSTOLIC BLOOD PRESSURE: 122 MMHG

## 2020-07-03 VITALS — DIASTOLIC BLOOD PRESSURE: 53 MMHG | SYSTOLIC BLOOD PRESSURE: 92 MMHG

## 2020-07-03 VITALS — DIASTOLIC BLOOD PRESSURE: 67 MMHG | SYSTOLIC BLOOD PRESSURE: 147 MMHG

## 2020-07-03 VITALS — SYSTOLIC BLOOD PRESSURE: 142 MMHG | DIASTOLIC BLOOD PRESSURE: 67 MMHG

## 2020-07-03 VITALS — SYSTOLIC BLOOD PRESSURE: 109 MMHG | DIASTOLIC BLOOD PRESSURE: 53 MMHG

## 2020-07-03 VITALS — DIASTOLIC BLOOD PRESSURE: 72 MMHG | SYSTOLIC BLOOD PRESSURE: 142 MMHG

## 2020-07-03 VITALS — DIASTOLIC BLOOD PRESSURE: 73 MMHG | SYSTOLIC BLOOD PRESSURE: 141 MMHG

## 2020-07-03 VITALS — DIASTOLIC BLOOD PRESSURE: 89 MMHG | SYSTOLIC BLOOD PRESSURE: 135 MMHG

## 2020-07-03 VITALS — SYSTOLIC BLOOD PRESSURE: 128 MMHG | DIASTOLIC BLOOD PRESSURE: 71 MMHG

## 2020-07-03 VITALS — DIASTOLIC BLOOD PRESSURE: 72 MMHG | SYSTOLIC BLOOD PRESSURE: 135 MMHG

## 2020-07-03 VITALS — SYSTOLIC BLOOD PRESSURE: 124 MMHG | DIASTOLIC BLOOD PRESSURE: 57 MMHG

## 2020-07-03 VITALS — SYSTOLIC BLOOD PRESSURE: 166 MMHG | DIASTOLIC BLOOD PRESSURE: 100 MMHG

## 2020-07-03 VITALS — SYSTOLIC BLOOD PRESSURE: 147 MMHG | DIASTOLIC BLOOD PRESSURE: 70 MMHG

## 2020-07-03 VITALS — DIASTOLIC BLOOD PRESSURE: 69 MMHG | SYSTOLIC BLOOD PRESSURE: 129 MMHG

## 2020-07-03 VITALS — SYSTOLIC BLOOD PRESSURE: 128 MMHG | DIASTOLIC BLOOD PRESSURE: 72 MMHG

## 2020-07-03 VITALS — SYSTOLIC BLOOD PRESSURE: 130 MMHG | DIASTOLIC BLOOD PRESSURE: 88 MMHG

## 2020-07-03 VITALS — DIASTOLIC BLOOD PRESSURE: 64 MMHG | SYSTOLIC BLOOD PRESSURE: 143 MMHG

## 2020-07-03 VITALS — DIASTOLIC BLOOD PRESSURE: 56 MMHG | SYSTOLIC BLOOD PRESSURE: 135 MMHG

## 2020-07-03 VITALS — SYSTOLIC BLOOD PRESSURE: 106 MMHG | DIASTOLIC BLOOD PRESSURE: 56 MMHG

## 2020-07-03 VITALS — SYSTOLIC BLOOD PRESSURE: 128 MMHG | DIASTOLIC BLOOD PRESSURE: 55 MMHG

## 2020-07-03 VITALS — SYSTOLIC BLOOD PRESSURE: 121 MMHG | DIASTOLIC BLOOD PRESSURE: 79 MMHG

## 2020-07-03 VITALS — DIASTOLIC BLOOD PRESSURE: 76 MMHG | SYSTOLIC BLOOD PRESSURE: 172 MMHG

## 2020-07-03 VITALS — SYSTOLIC BLOOD PRESSURE: 155 MMHG | DIASTOLIC BLOOD PRESSURE: 80 MMHG

## 2020-07-03 VITALS — DIASTOLIC BLOOD PRESSURE: 75 MMHG | SYSTOLIC BLOOD PRESSURE: 154 MMHG

## 2020-07-03 VITALS — SYSTOLIC BLOOD PRESSURE: 127 MMHG | DIASTOLIC BLOOD PRESSURE: 67 MMHG

## 2020-07-03 VITALS — DIASTOLIC BLOOD PRESSURE: 64 MMHG | SYSTOLIC BLOOD PRESSURE: 125 MMHG

## 2020-07-03 VITALS — SYSTOLIC BLOOD PRESSURE: 136 MMHG | DIASTOLIC BLOOD PRESSURE: 73 MMHG

## 2020-07-03 VITALS — SYSTOLIC BLOOD PRESSURE: 141 MMHG | DIASTOLIC BLOOD PRESSURE: 81 MMHG

## 2020-07-03 VITALS — SYSTOLIC BLOOD PRESSURE: 121 MMHG | DIASTOLIC BLOOD PRESSURE: 67 MMHG

## 2020-07-03 VITALS — DIASTOLIC BLOOD PRESSURE: 62 MMHG | SYSTOLIC BLOOD PRESSURE: 131 MMHG

## 2020-07-03 VITALS — SYSTOLIC BLOOD PRESSURE: 127 MMHG | DIASTOLIC BLOOD PRESSURE: 53 MMHG

## 2020-07-03 VITALS — SYSTOLIC BLOOD PRESSURE: 133 MMHG | DIASTOLIC BLOOD PRESSURE: 77 MMHG

## 2020-07-03 VITALS — DIASTOLIC BLOOD PRESSURE: 55 MMHG | SYSTOLIC BLOOD PRESSURE: 104 MMHG

## 2020-07-03 VITALS — DIASTOLIC BLOOD PRESSURE: 56 MMHG | SYSTOLIC BLOOD PRESSURE: 114 MMHG

## 2020-07-03 VITALS — DIASTOLIC BLOOD PRESSURE: 57 MMHG | SYSTOLIC BLOOD PRESSURE: 118 MMHG

## 2020-07-03 VITALS — SYSTOLIC BLOOD PRESSURE: 110 MMHG | DIASTOLIC BLOOD PRESSURE: 52 MMHG

## 2020-07-03 VITALS — DIASTOLIC BLOOD PRESSURE: 61 MMHG | SYSTOLIC BLOOD PRESSURE: 95 MMHG

## 2020-07-03 VITALS — SYSTOLIC BLOOD PRESSURE: 139 MMHG | DIASTOLIC BLOOD PRESSURE: 60 MMHG

## 2020-07-03 VITALS — SYSTOLIC BLOOD PRESSURE: 139 MMHG | DIASTOLIC BLOOD PRESSURE: 67 MMHG

## 2020-07-03 VITALS — SYSTOLIC BLOOD PRESSURE: 138 MMHG | DIASTOLIC BLOOD PRESSURE: 69 MMHG

## 2020-07-03 VITALS — DIASTOLIC BLOOD PRESSURE: 63 MMHG | SYSTOLIC BLOOD PRESSURE: 107 MMHG

## 2020-07-03 VITALS — SYSTOLIC BLOOD PRESSURE: 163 MMHG | DIASTOLIC BLOOD PRESSURE: 79 MMHG

## 2020-07-03 VITALS — SYSTOLIC BLOOD PRESSURE: 147 MMHG | DIASTOLIC BLOOD PRESSURE: 85 MMHG

## 2020-07-03 VITALS — SYSTOLIC BLOOD PRESSURE: 150 MMHG | DIASTOLIC BLOOD PRESSURE: 72 MMHG

## 2020-07-03 VITALS — DIASTOLIC BLOOD PRESSURE: 66 MMHG | SYSTOLIC BLOOD PRESSURE: 123 MMHG

## 2020-07-03 VITALS — DIASTOLIC BLOOD PRESSURE: 67 MMHG | SYSTOLIC BLOOD PRESSURE: 121 MMHG

## 2020-07-03 VITALS — DIASTOLIC BLOOD PRESSURE: 86 MMHG | SYSTOLIC BLOOD PRESSURE: 147 MMHG

## 2020-07-03 VITALS — SYSTOLIC BLOOD PRESSURE: 111 MMHG | DIASTOLIC BLOOD PRESSURE: 71 MMHG

## 2020-07-03 VITALS — SYSTOLIC BLOOD PRESSURE: 152 MMHG | DIASTOLIC BLOOD PRESSURE: 67 MMHG

## 2020-07-03 VITALS — SYSTOLIC BLOOD PRESSURE: 127 MMHG | DIASTOLIC BLOOD PRESSURE: 82 MMHG

## 2020-07-03 VITALS — DIASTOLIC BLOOD PRESSURE: 75 MMHG | SYSTOLIC BLOOD PRESSURE: 141 MMHG

## 2020-07-03 VITALS — SYSTOLIC BLOOD PRESSURE: 149 MMHG | DIASTOLIC BLOOD PRESSURE: 71 MMHG

## 2020-07-03 VITALS — DIASTOLIC BLOOD PRESSURE: 72 MMHG | SYSTOLIC BLOOD PRESSURE: 99 MMHG

## 2020-07-03 VITALS — DIASTOLIC BLOOD PRESSURE: 56 MMHG | SYSTOLIC BLOOD PRESSURE: 113 MMHG

## 2020-07-03 VITALS — SYSTOLIC BLOOD PRESSURE: 115 MMHG | DIASTOLIC BLOOD PRESSURE: 49 MMHG

## 2020-07-03 VITALS — DIASTOLIC BLOOD PRESSURE: 76 MMHG | SYSTOLIC BLOOD PRESSURE: 119 MMHG

## 2020-07-03 VITALS — DIASTOLIC BLOOD PRESSURE: 78 MMHG | SYSTOLIC BLOOD PRESSURE: 138 MMHG

## 2020-07-03 VITALS — DIASTOLIC BLOOD PRESSURE: 51 MMHG | SYSTOLIC BLOOD PRESSURE: 93 MMHG

## 2020-07-03 VITALS — SYSTOLIC BLOOD PRESSURE: 122 MMHG | DIASTOLIC BLOOD PRESSURE: 71 MMHG

## 2020-07-03 VITALS — SYSTOLIC BLOOD PRESSURE: 125 MMHG | DIASTOLIC BLOOD PRESSURE: 67 MMHG

## 2020-07-03 LAB
ALBUMIN SERPL-MCNC: 1.9 G/DL (ref 3.4–5)
ALP SERPL-CCNC: 77 U/L (ref 45–117)
ALT SERPL-CCNC: 28 U/L (ref 13–56)
ANION GAP SERPL CALCULATED.3IONS-SCNC: 0 MMOL/L (ref 5–15)
BILIRUB SERPL-MCNC: 0.9 MG/DL (ref 0.2–1)
BUN SERPL-MCNC: 18 MG/DL (ref 7–18)
BUN/CREAT SERPL: 38.3
CALCIUM SERPL-MCNC: 8.5 MG/DL (ref 8.5–10.1)
CHLORIDE SERPL-SCNC: 99 MMOL/L (ref 98–107)
CO2 SERPL-SCNC: 40 MMOL/L (ref 21–32)
GLUCOSE SERPL-MCNC: 102 MG/DL (ref 74–106)
HCT VFR BLD AUTO: 27.8 % (ref 36–46)
HGB BLD-MCNC: 9.3 G/DL (ref 12.2–16.2)
MAGNESIUM SERPL-MCNC: 1.8 MG/DL (ref 1.6–2.6)
MCH RBC QN AUTO: 26.5 PG (ref 28–32)
MCV RBC AUTO: 79.4 FL (ref 80–100)
NRBC BLD QL AUTO: 0 %
POTASSIUM SERPL-SCNC: 3.1 MMOL/L (ref 3.5–5.1)
PROT SERPL-MCNC: 5.7 G/DL (ref 6.4–8.2)
SODIUM SERPL-SCNC: 139 MMOL/L (ref 136–145)

## 2020-07-03 PROCEDURE — 5A09357 ASSISTANCE WITH RESPIRATORY VENTILATION, LESS THAN 24 CONSECUTIVE HOURS, CONTINUOUS POSITIVE AIRWAY PRESSURE: ICD-10-PCS | Performed by: EMERGENCY MEDICINE

## 2020-07-03 RX ADMIN — HUMAN INSULIN SCH UNITS: 100 INJECTION, SOLUTION SUBCUTANEOUS at 06:00

## 2020-07-03 RX ADMIN — MEROPENEM SCH MLS/HR: 1 INJECTION, POWDER, FOR SOLUTION INTRAVENOUS at 23:00

## 2020-07-03 RX ADMIN — ALBUTEROL SULFATE SCH MG: 2.5 SOLUTION RESPIRATORY (INHALATION) at 22:20

## 2020-07-03 RX ADMIN — PHENYLEPHRINE HYDROCHLORIDE SCH MLS/HR: 10 INJECTION INTRAVENOUS at 09:48

## 2020-07-03 RX ADMIN — PHENYLEPHRINE HYDROCHLORIDE SCH MLS/HR: 10 INJECTION INTRAVENOUS at 01:28

## 2020-07-03 RX ADMIN — PANTOPRAZOLE SODIUM SCH MG: 40 INJECTION, POWDER, FOR SOLUTION INTRAVENOUS at 23:00

## 2020-07-03 RX ADMIN — IPRATROPIUM BROMIDE SCH MG: 0.5 SOLUTION RESPIRATORY (INHALATION) at 22:20

## 2020-07-03 RX ADMIN — POTASSIUM CHLORIDE SCH MLS/HR: 200 INJECTION, SOLUTION INTRAVENOUS at 01:44

## 2020-07-03 RX ADMIN — MIDAZOLAM SCH MLS/HR: 5 INJECTION, SOLUTION INTRAMUSCULAR; INTRAVENOUS at 14:47

## 2020-07-03 RX ADMIN — NOREPINEPHRINE BITARTRATE SCH MLS/HR: 1 INJECTION, SOLUTION, CONCENTRATE INTRAVENOUS at 15:15

## 2020-07-03 RX ADMIN — HUMAN INSULIN SCH UNITS: 100 INJECTION, SOLUTION SUBCUTANEOUS at 18:00

## 2020-07-03 RX ADMIN — PANTOPRAZOLE SODIUM SCH MG: 40 INJECTION, POWDER, FOR SOLUTION INTRAVENOUS at 11:04

## 2020-07-03 RX ADMIN — Medication SCH STRIP: at 18:12

## 2020-07-03 RX ADMIN — FUROSEMIDE SCH MG: 10 INJECTION, SOLUTION INTRAMUSCULAR; INTRAVENOUS at 23:00

## 2020-07-03 RX ADMIN — HUMAN INSULIN SCH UNITS: 100 INJECTION, SOLUTION SUBCUTANEOUS at 12:57

## 2020-07-03 RX ADMIN — DOPAMINE HYDROCHLORIDE IN DEXTROSE SCH MLS/HR: 1.6 INJECTION, SOLUTION INTRAVENOUS at 20:15

## 2020-07-03 RX ADMIN — POTASSIUM CHLORIDE SCH MLS/HR: 200 INJECTION, SOLUTION INTRAVENOUS at 03:03

## 2020-07-03 RX ADMIN — ALBUTEROL SULFATE SCH MG: 2.5 SOLUTION RESPIRATORY (INHALATION) at 18:15

## 2020-07-03 RX ADMIN — VANCOMYCIN HYDROCHLORIDE SCH MLS/HR: 1 INJECTION, POWDER, LYOPHILIZED, FOR SOLUTION INTRAVENOUS at 08:58

## 2020-07-03 RX ADMIN — PHENYLEPHRINE HYDROCHLORIDE SCH MLS/HR: 10 INJECTION INTRAVENOUS at 18:08

## 2020-07-03 RX ADMIN — DOPAMINE HYDROCHLORIDE IN DEXTROSE SCH MLS/HR: 1.6 INJECTION, SOLUTION INTRAVENOUS at 03:17

## 2020-07-03 RX ADMIN — MEROPENEM SCH MLS/HR: 1 INJECTION, POWDER, FOR SOLUTION INTRAVENOUS at 11:04

## 2020-07-03 RX ADMIN — SODIUM CHLORIDE NR MLS/HR: 234 INJECTION INTRAMUSCULAR; INTRAVENOUS; SUBCUTANEOUS at 20:09

## 2020-07-03 RX ADMIN — IPRATROPIUM BROMIDE SCH MG: 0.5 SOLUTION RESPIRATORY (INHALATION) at 18:14

## 2020-07-03 RX ADMIN — Medication SCH STRIP: at 06:10

## 2020-07-03 RX ADMIN — FUROSEMIDE SCH MG: 10 INJECTION, SOLUTION INTRAMUSCULAR; INTRAVENOUS at 11:04

## 2020-07-03 RX ADMIN — VANCOMYCIN HYDROCHLORIDE SCH MLS/HR: 1 INJECTION, POWDER, LYOPHILIZED, FOR SOLUTION INTRAVENOUS at 20:09

## 2020-07-03 RX ADMIN — Medication SCH STRIP: at 12:57

## 2020-07-03 NOTE — NUR
RT NOTE

ROUTINE VENT CHECK DONE. PT INTUBATED AND ON VENT ADQ 0236 ON STATED SETTINGS. VENT IS 
PLUGGED TO RED OUTLET. ALARMS ARE  ON AND AUDIBLE TO NURSING. AMBU BAG AT BEDSIDE AND 
CONNECTED TO O2 SOURCE. 8.0 ETT SECURED 

WITH ANCHORFAST AT 22 CM TO THE ORAL CENTER. CONT AS ORDERED. %, PT TEMP 99.5 ON 
COOLING MEASURES

-------------------------------------------------------------------------------

Addendum: 07/03/20 at 0410 by Ann Marie Marques RT

-------------------------------------------------------------------------------

Amended: Links added.

## 2020-07-03 NOTE — NUR
OPEN

ASSUMED CARE OF FEMALE PT S/P EXTUBATION TODAY. PT ON BIPAP MASK 14/5 40%. PT WITH AUDIBLE 
WHEEZING AND TIGHTNESS TO LUNG LOBATO. WILL NOTIFY R.T. PT IN NO APPARENT DISTRESS AT THIS 
TIME. PT Macedonian SPEAKING ONLY. ABLE TO FOLLOW SIMPLE COMMANDS AT THIS TIME. ABLE TO MOVE 
ALL EXTREMITIES. PT FATIGUED.  SINUS RHYTHM ON CARDIAC MONITOR. D5 0.45 NS INFUSING AT 30 
ML/HR. PT WITH R. IJ TLC WITH NON INTACT SUTURES.  TLC TO R. FEMORAL. ALL PORTS PATENT. 
DRESSING CDI. ZAYAS TO GRAVITY DRAINING CLEAR LIGHT SKINNY URINE. SHAWN SCD'S IN PLACE. NO SKIN 
BREAKDOWN OBSERVED. PT WITH MALFORMED FINGERS AND THUMBS. HX OF ARTHRITIS. GENERALIZED EDEMA 
TO SHAWN UPPER AND LOWER EXTREMITIES. BED IN LOWEST LOCKED POSITION. PILLOWS USED TO OFFLOAD 
BONY PROMINENCES AND SHAWN HEELS. HOB ELEVATED 30 DEGREES. ORAL CARE PROVIDED. PT IN FULL VIEW 
OF RN STATION. WILL CONTINUE TO MONITOR.

## 2020-07-03 NOTE — NUR
Provider/Hospitalist at bedside





Dr. TABATHA Andrews made rounds and saw pt. and updated MD on pt.'s status, orders were made.

## 2020-07-03 NOTE — NUR
Provider/Hospitalist at bedside





Dr. Aguilar made rounds and saw pt. and updated MD on pt.'s status, MD made some orders.

## 2020-07-03 NOTE — NUR
MD PAGE





no callback from Dr. Aguilar, another page was made to MD, and paged Dr. TABATHA Andrews for 
orders, awaiting callback.

## 2020-07-03 NOTE — NUR
RT NOTE

ROUTINE VENT CHECK DONE. PT INTUBATED AND ON VENT ADQ 0236 ON STATED SETTINGS. VENT IS 
PLUGGED TO RED OUTLET. ALARMS ARE  ON AND AUDIBLE TO NURSING. AMBU BAG AT BEDSIDE AND 
CONNECTED TO O2 SOURCE. 8.0 ETT SECURED 

WITH ANCHORFAST AT 22 CM TO THE ORAL CENTER. HME AND INLINE SUCTION CHANGED WITHOUT 
INCIDENT. PT WAS SUCTIONED FOR SMALL RETURN. CONT AS ORDERED. POX 99%, PT TEMP 99.5 ON 
COOLING MEASURES

-------------------------------------------------------------------------------

Addendum: 07/03/20 at 0222 by Ann Marie Marques RT

-------------------------------------------------------------------------------

Amended: Links added.

## 2020-07-03 NOTE — NUR
Respiratory note:

AT BEDSIDE FOR BIPAP CHECK, PT IN NO RESPIRATORY DISTRESS. RN REQUESTED PRN MED NEB TX BE 
GIVEN DUE TO WHEEZES/RHONCHI. TX GIVEN IN LINE ON BIPAP. TOLERATING WELL NO ADVERSE 
REACTIONS NOTED. WILL CONTINUE TO MONITOR.

## 2020-07-03 NOTE — NUR
MD PAGED





Dr. TABATHA Andrews paged and called back and updated MD. on pt.'s condition, phone orders were 
given.

## 2020-07-03 NOTE — NUR
OVERRIDE MEDS: ALBUTEROL 2.5MG AND ATROVENT 0.5MG GIVEN INLINE DUE TO PT  WHEEZING T/O. 
WAITING FOR DR. GUILLERMO TO CALL BACK FOR FURTHER ORDERS. PT ON BIPAP SETTINGS OF 14/5 BUR 
12, 40% FIO2.

## 2020-07-03 NOTE — NUR
ASSESSMENT





Pt. resting, arousable to verbal stimuli then gets restless/agitated with physical 
assessment, pt. on vent-orally intubated, IVFs, Clinimix and Fentanyl gtt. via her right 
femoral TLC-site benign, right IJ TLC also noted with catheter out by approx. 2-3cm. with 
dressing holding the catheter in place-CVP readings seen up on the monitor, chen catheter 
intact, SCDs on, no signs of pain/distress seen, will monitor pt.

## 2020-07-03 NOTE — NUR
EXTUBATED PT AT APPROXIMATELY 1328 PER DR. GUILLERMO'S ORDERS. RN URIEL AT BEDSIDE. PLACED 
PT ON 8L CA AT 30% FIO2. NO STRIDOR NOTED. , RR 22, SPO2 96 WITH COARSE BS. NO 
DISTRESS NOTED. WILL CONTINUE TO MONITOR PT.

## 2020-07-03 NOTE — NUR
PLACED PT ON BIPAP ON SETTINGS 12/5 BUR 12, 40% FIO2 DUE TO INCREASED WOB. WAITING ON MD GUILLERMO FOR FURTHER ORDERS.

## 2020-07-04 VITALS — DIASTOLIC BLOOD PRESSURE: 52 MMHG | SYSTOLIC BLOOD PRESSURE: 108 MMHG

## 2020-07-04 VITALS — SYSTOLIC BLOOD PRESSURE: 109 MMHG | DIASTOLIC BLOOD PRESSURE: 52 MMHG

## 2020-07-04 VITALS — DIASTOLIC BLOOD PRESSURE: 63 MMHG | SYSTOLIC BLOOD PRESSURE: 119 MMHG

## 2020-07-04 VITALS — SYSTOLIC BLOOD PRESSURE: 112 MMHG | DIASTOLIC BLOOD PRESSURE: 59 MMHG

## 2020-07-04 VITALS — DIASTOLIC BLOOD PRESSURE: 54 MMHG | SYSTOLIC BLOOD PRESSURE: 109 MMHG

## 2020-07-04 VITALS — DIASTOLIC BLOOD PRESSURE: 68 MMHG | SYSTOLIC BLOOD PRESSURE: 121 MMHG

## 2020-07-04 VITALS — DIASTOLIC BLOOD PRESSURE: 53 MMHG | SYSTOLIC BLOOD PRESSURE: 113 MMHG

## 2020-07-04 VITALS — SYSTOLIC BLOOD PRESSURE: 112 MMHG | DIASTOLIC BLOOD PRESSURE: 57 MMHG

## 2020-07-04 VITALS — DIASTOLIC BLOOD PRESSURE: 45 MMHG | SYSTOLIC BLOOD PRESSURE: 109 MMHG

## 2020-07-04 VITALS — DIASTOLIC BLOOD PRESSURE: 51 MMHG | SYSTOLIC BLOOD PRESSURE: 117 MMHG

## 2020-07-04 VITALS — SYSTOLIC BLOOD PRESSURE: 83 MMHG | DIASTOLIC BLOOD PRESSURE: 59 MMHG

## 2020-07-04 VITALS — SYSTOLIC BLOOD PRESSURE: 97 MMHG | DIASTOLIC BLOOD PRESSURE: 54 MMHG

## 2020-07-04 VITALS — DIASTOLIC BLOOD PRESSURE: 53 MMHG | SYSTOLIC BLOOD PRESSURE: 102 MMHG

## 2020-07-04 VITALS — DIASTOLIC BLOOD PRESSURE: 55 MMHG | SYSTOLIC BLOOD PRESSURE: 103 MMHG

## 2020-07-04 VITALS — SYSTOLIC BLOOD PRESSURE: 120 MMHG | DIASTOLIC BLOOD PRESSURE: 64 MMHG

## 2020-07-04 VITALS — SYSTOLIC BLOOD PRESSURE: 113 MMHG | DIASTOLIC BLOOD PRESSURE: 58 MMHG

## 2020-07-04 VITALS — SYSTOLIC BLOOD PRESSURE: 89 MMHG | DIASTOLIC BLOOD PRESSURE: 57 MMHG

## 2020-07-04 VITALS — SYSTOLIC BLOOD PRESSURE: 99 MMHG | DIASTOLIC BLOOD PRESSURE: 52 MMHG

## 2020-07-04 VITALS — DIASTOLIC BLOOD PRESSURE: 50 MMHG | SYSTOLIC BLOOD PRESSURE: 97 MMHG

## 2020-07-04 VITALS — DIASTOLIC BLOOD PRESSURE: 49 MMHG | SYSTOLIC BLOOD PRESSURE: 88 MMHG

## 2020-07-04 LAB
ALBUMIN SERPL-MCNC: 2 G/DL (ref 3.4–5)
ALP SERPL-CCNC: 73 U/L (ref 45–117)
ALT SERPL-CCNC: 25 U/L (ref 13–56)
ANION GAP SERPL CALCULATED.3IONS-SCNC: 4 MMOL/L (ref 5–15)
BILIRUB SERPL-MCNC: 0.8 MG/DL (ref 0.2–1)
BUN SERPL-MCNC: 27 MG/DL (ref 7–18)
BUN/CREAT SERPL: 45
CALCIUM SERPL-MCNC: 8.5 MG/DL (ref 8.5–10.1)
CHLORIDE SERPL-SCNC: 99 MMOL/L (ref 98–107)
CO2 SERPL-SCNC: 35 MMOL/L (ref 21–32)
GLUCOSE SERPL-MCNC: 154 MG/DL (ref 74–106)
MAGNESIUM SERPL-MCNC: 2.1 MG/DL (ref 1.6–2.6)
POTASSIUM SERPL-SCNC: 3.5 MMOL/L (ref 3.5–5.1)
PROT SERPL-MCNC: 6 G/DL (ref 6.4–8.2)
SODIUM SERPL-SCNC: 138 MMOL/L (ref 136–145)

## 2020-07-04 RX ADMIN — ALBUTEROL SULFATE SCH MG: 2.5 SOLUTION RESPIRATORY (INHALATION) at 06:23

## 2020-07-04 RX ADMIN — HYDROCODONE BITARTRATE AND ACETAMINOPHEN PRN TAB: 5; 325 TABLET ORAL at 12:13

## 2020-07-04 RX ADMIN — PHENYLEPHRINE HYDROCHLORIDE SCH MLS/HR: 10 INJECTION INTRAVENOUS at 10:43

## 2020-07-04 RX ADMIN — MEROPENEM SCH MLS/HR: 1 INJECTION, POWDER, FOR SOLUTION INTRAVENOUS at 23:17

## 2020-07-04 RX ADMIN — Medication SCH STRIP: at 01:00

## 2020-07-04 RX ADMIN — PANTOPRAZOLE SODIUM SCH MG: 40 INJECTION, POWDER, FOR SOLUTION INTRAVENOUS at 10:42

## 2020-07-04 RX ADMIN — HUMAN INSULIN SCH UNITS: 100 INJECTION, SOLUTION SUBCUTANEOUS at 18:00

## 2020-07-04 RX ADMIN — Medication SCH STRIP: at 12:12

## 2020-07-04 RX ADMIN — VANCOMYCIN HYDROCHLORIDE SCH MLS/HR: 1 INJECTION, POWDER, LYOPHILIZED, FOR SOLUTION INTRAVENOUS at 08:24

## 2020-07-04 RX ADMIN — POTASSIUM CHLORIDE SCH MLS/HR: 200 INJECTION, SOLUTION INTRAVENOUS at 10:42

## 2020-07-04 RX ADMIN — VANCOMYCIN HYDROCHLORIDE SCH MLS/HR: 1 INJECTION, POWDER, LYOPHILIZED, FOR SOLUTION INTRAVENOUS at 21:10

## 2020-07-04 RX ADMIN — HUMAN INSULIN SCH UNITS: 100 INJECTION, SOLUTION SUBCUTANEOUS at 06:00

## 2020-07-04 RX ADMIN — PANTOPRAZOLE SODIUM SCH MG: 40 INJECTION, POWDER, FOR SOLUTION INTRAVENOUS at 23:16

## 2020-07-04 RX ADMIN — IPRATROPIUM BROMIDE SCH MG: 0.5 SOLUTION RESPIRATORY (INHALATION) at 19:37

## 2020-07-04 RX ADMIN — IPRATROPIUM BROMIDE SCH MG: 0.5 SOLUTION RESPIRATORY (INHALATION) at 13:36

## 2020-07-04 RX ADMIN — SODIUM CHLORIDE NR MLS/HR: 234 INJECTION INTRAMUSCULAR; INTRAVENOUS; SUBCUTANEOUS at 19:49

## 2020-07-04 RX ADMIN — IPRATROPIUM BROMIDE SCH MG: 0.5 SOLUTION RESPIRATORY (INHALATION) at 02:13

## 2020-07-04 RX ADMIN — ALBUTEROL SULFATE SCH MG: 2.5 SOLUTION RESPIRATORY (INHALATION) at 22:47

## 2020-07-04 RX ADMIN — POTASSIUM CHLORIDE SCH MLS/HR: 200 INJECTION, SOLUTION INTRAVENOUS at 12:55

## 2020-07-04 RX ADMIN — HUMAN INSULIN SCH UNITS: 100 INJECTION, SOLUTION SUBCUTANEOUS at 00:00

## 2020-07-04 RX ADMIN — METHYLPREDNISOLONE SODIUM SUCCINATE SCH MG: 40 INJECTION, POWDER, FOR SOLUTION INTRAMUSCULAR; INTRAVENOUS at 23:17

## 2020-07-04 RX ADMIN — ALBUTEROL SULFATE SCH MG: 2.5 SOLUTION RESPIRATORY (INHALATION) at 02:13

## 2020-07-04 RX ADMIN — METHYLPREDNISOLONE SODIUM SUCCINATE SCH MG: 40 INJECTION, POWDER, FOR SOLUTION INTRAMUSCULAR; INTRAVENOUS at 13:41

## 2020-07-04 RX ADMIN — IPRATROPIUM BROMIDE SCH MG: 0.5 SOLUTION RESPIRATORY (INHALATION) at 09:59

## 2020-07-04 RX ADMIN — HYDROCODONE BITARTRATE AND ACETAMINOPHEN PRN TAB: 5; 325 TABLET ORAL at 21:11

## 2020-07-04 RX ADMIN — Medication SCH STRIP: at 19:18

## 2020-07-04 RX ADMIN — HUMAN INSULIN SCH UNITS: 100 INJECTION, SOLUTION SUBCUTANEOUS at 12:00

## 2020-07-04 RX ADMIN — HUMAN INSULIN SCH UNITS: 100 INJECTION, SOLUTION SUBCUTANEOUS at 19:22

## 2020-07-04 RX ADMIN — FENTANYL CITRATE SCH MLS/HR: 50 INJECTION, SOLUTION INTRAMUSCULAR; INTRAVENOUS at 09:25

## 2020-07-04 RX ADMIN — DEXTROSE AND SODIUM CHLORIDE SCH MLS/HR: 5; 450 INJECTION, SOLUTION INTRAVENOUS at 10:43

## 2020-07-04 RX ADMIN — ALBUTEROL SULFATE SCH MG: 2.5 SOLUTION RESPIRATORY (INHALATION) at 19:37

## 2020-07-04 RX ADMIN — NOREPINEPHRINE BITARTRATE SCH MLS/HR: 1 INJECTION, SOLUTION, CONCENTRATE INTRAVENOUS at 15:02

## 2020-07-04 RX ADMIN — FUROSEMIDE SCH MG: 10 INJECTION, SOLUTION INTRAMUSCULAR; INTRAVENOUS at 23:17

## 2020-07-04 RX ADMIN — MEROPENEM SCH MLS/HR: 1 INJECTION, POWDER, FOR SOLUTION INTRAVENOUS at 10:40

## 2020-07-04 RX ADMIN — DOPAMINE HYDROCHLORIDE IN DEXTROSE SCH MLS/HR: 1.6 INJECTION, SOLUTION INTRAVENOUS at 12:35

## 2020-07-04 RX ADMIN — ALBUTEROL SULFATE SCH MG: 2.5 SOLUTION RESPIRATORY (INHALATION) at 09:59

## 2020-07-04 RX ADMIN — FUROSEMIDE SCH MG: 10 INJECTION, SOLUTION INTRAMUSCULAR; INTRAVENOUS at 10:41

## 2020-07-04 RX ADMIN — ALBUTEROL SULFATE SCH MG: 2.5 SOLUTION RESPIRATORY (INHALATION) at 13:35

## 2020-07-04 RX ADMIN — IPRATROPIUM BROMIDE SCH MG: 0.5 SOLUTION RESPIRATORY (INHALATION) at 06:23

## 2020-07-04 RX ADMIN — PHENYLEPHRINE HYDROCHLORIDE SCH MLS/HR: 10 INJECTION INTRAVENOUS at 02:28

## 2020-07-04 RX ADMIN — IPRATROPIUM BROMIDE SCH MG: 0.5 SOLUTION RESPIRATORY (INHALATION) at 22:48

## 2020-07-04 RX ADMIN — METHYLPREDNISOLONE SODIUM SUCCINATE SCH MG: 40 INJECTION, POWDER, FOR SOLUTION INTRAMUSCULAR; INTRAVENOUS at 06:00

## 2020-07-04 RX ADMIN — Medication SCH STRIP: at 06:00

## 2020-07-04 RX ADMIN — MIDAZOLAM SCH MLS/HR: 5 INJECTION, SOLUTION INTRAMUSCULAR; INTRAVENOUS at 14:45

## 2020-07-04 NOTE — NUR
MD Andrews in to see patient. Orders received: 1) Transfer to Telemetry, 2) Norco 5/325 mg 0.5 
tablets q 6 hrs PRN moderate pain.

## 2020-07-04 NOTE — NUR
Patient refused regular insulin per sliding scale for blood glucose of 140 mg/dl. Patient 
states she is not diabetic and does not need insulin. I attempted to educate the patient 
regarding the importance of strict blood glucose control during hospitalization and illness 
but patient still refuses insulin.

## 2020-07-04 NOTE — NUR
MD Paredes notified per pharmacy request if patient should continue Clinimix infusion today. 
Per MD Paredes, continue one more day. Also requested dietary order. Patient able to tolerate 
ice chips. Order received: Clear liquid diet.

## 2020-07-04 NOTE — NUR
Assisted patient with eating her lunch. Patient can barely raise her arms to lift spoon or 
straw to mouth. 75% of meal consumed.

## 2020-07-04 NOTE — NUR
Placing order for Potassium chloride 40 mEq IV once per MD Andrews's communication order from 
7/1/20 to order said medication at said dose if patient's potassium level is below 3.6 mEq. 
Patient's potassium level this morning is 3.5 mEq. Order will be placed.

## 2020-07-04 NOTE — NUR
Patient refused insulin administration stating she does not normally have increased blood 
sugars. Patient educated on risk and benefits, patient verbalized understanding. Patient 
refused. 

-------------------------------------------------------------------------------

Addendum: 07/05/20 at 0743 by JULIET MONTOYA RN RN

-------------------------------------------------------------------------------

Wrong date and time entered: date of refusal 7/5/20 time 0000.

## 2020-07-04 NOTE — NUR
NEURO/TEACHING

PT MOSTLY [place] SPEAKING. WITH USE OF [place] SPEAKING RN.  PT ABLE TO COMMUNICATE. ABLE 
TO FOLLOW COMMANDS. EDUCATED PT REGARDING BIPAP MASK. REORIENTED PT TO HOSPITALIZATION. PT 
VERBALIZED UNDERSTANDING. CALL BELL IN REACH.

## 2020-07-04 NOTE — NUR
Opening Shift Note

Assumed care of patient, awake and alert.  No S/S of distress/SOB or pain.  Patient safety 
measures in place bed in lowest position, side rails up x2, call light within reach, and bed 
alarm on. Instructed on POC and to call for assist PRN, will continue to monitor for changes 
Q1hr and PRN.

## 2020-07-04 NOTE — NUR
Called pharmacy patient Clinimix unavailable to administer, current bag patient is running 
is half full. Pharmacy said continue administrating the bag that is hanging. Pharmacy will 
reschedule new administration time.

## 2020-07-04 NOTE — NUR
Closing Shift Report

gave report to SANTOS Hamilton. Patient shows no signs of distress at this time.

## 2020-07-04 NOTE — NUR
Patient transferred to room 223a using ACLS protocol. Patient transported on 1 l/min NC. All 
belongings taken to room with patient. CNA at bedside upon arrival. Patient alert and 
oriented x 4 with no signs of distress or SOB upon transfer.

## 2020-07-04 NOTE — NUR
Opening Shift Note

Assumed care of patient, awake and alert x 3. Spoke to patient via Malagasy RN  
SANTOS Hamilton. Patient currently on BiPAP mask with 30% FiO2. No S/S of distress/SOB or pain. 
Bed is in lowest position and locked. Call light within reach. Board updated in Malagasy. Bed 
alarm on. Instructed on POC and to call for assist PRN, will continue to monitor for changes 
Q1hr and PRN.

## 2020-07-04 NOTE — NUR
Patient arrived to floor. Patient shows no signs of distress at this time. Will continue to 
monitor.

## 2020-07-04 NOTE — NUR
CENTRAL LINE REMOVAL

R. IJ TLC WITH NON INTACT SUTURES LAYING PART WAY OUT. PT WITH TLC TO R. GROIN INFUSING ALL 
MEDICATIONS. R. IJ TLC REMOVED. NO BLEEDING. STERILE 4X4 AND NON OCCLUSIVE DRESSING PLACED 
TO SITE.

## 2020-07-04 NOTE — NUR
PT TAKEN OFF BIPAP. PT ON 2L/MIN VIA NC, 99% O2 SATS, HR 90 BPM, RR22 BPM, BS ARE 
BIBASILARLY DIMINISHED TO AUSCULTATION, SKIN IS WARM AND DRY TO THE TOUCH. NO SOB OR ANY 
OTHER ACUTE DISTRESS NOTICED.  PT IS ASKING FOR "BREAKFAST".  PT TOLERATING WELL. WILL 
CONTINUE TO MONITOR PT.

## 2020-07-05 VITALS — DIASTOLIC BLOOD PRESSURE: 49 MMHG | SYSTOLIC BLOOD PRESSURE: 101 MMHG

## 2020-07-05 VITALS — DIASTOLIC BLOOD PRESSURE: 49 MMHG | SYSTOLIC BLOOD PRESSURE: 109 MMHG

## 2020-07-05 VITALS — SYSTOLIC BLOOD PRESSURE: 101 MMHG | DIASTOLIC BLOOD PRESSURE: 43 MMHG

## 2020-07-05 VITALS — DIASTOLIC BLOOD PRESSURE: 49 MMHG | SYSTOLIC BLOOD PRESSURE: 106 MMHG

## 2020-07-05 VITALS — SYSTOLIC BLOOD PRESSURE: 104 MMHG | DIASTOLIC BLOOD PRESSURE: 47 MMHG

## 2020-07-05 LAB
ALBUMIN SERPL-MCNC: 2.1 G/DL (ref 3.4–5)
ALP SERPL-CCNC: 68 U/L (ref 45–117)
ALT SERPL-CCNC: 25 U/L (ref 13–56)
ANION GAP SERPL CALCULATED.3IONS-SCNC: 4 MMOL/L (ref 5–15)
BILIRUB SERPL-MCNC: 0.7 MG/DL (ref 0.2–1)
BUN SERPL-MCNC: 35 MG/DL (ref 7–18)
BUN/CREAT SERPL: 59.3
CALCIUM SERPL-MCNC: 8.3 MG/DL (ref 8.5–10.1)
CHLORIDE SERPL-SCNC: 104 MMOL/L (ref 98–107)
CO2 SERPL-SCNC: 34 MMOL/L (ref 21–32)
GLUCOSE SERPL-MCNC: 140 MG/DL (ref 74–106)
MAGNESIUM SERPL-MCNC: 2.3 MG/DL (ref 1.6–2.6)
POTASSIUM SERPL-SCNC: 3.3 MMOL/L (ref 3.5–5.1)
PROT SERPL-MCNC: 6 G/DL (ref 6.4–8.2)
SODIUM SERPL-SCNC: 142 MMOL/L (ref 136–145)
TRIGL SERPL-MCNC: 93 MG/DL (ref ?–150)

## 2020-07-05 RX ADMIN — IPRATROPIUM BROMIDE SCH MG: 0.5 SOLUTION RESPIRATORY (INHALATION) at 02:45

## 2020-07-05 RX ADMIN — IPRATROPIUM BROMIDE SCH MG: 0.5 SOLUTION RESPIRATORY (INHALATION) at 22:27

## 2020-07-05 RX ADMIN — MEROPENEM SCH MLS/HR: 1 INJECTION, POWDER, FOR SOLUTION INTRAVENOUS at 22:53

## 2020-07-05 RX ADMIN — Medication SCH STRIP: at 17:38

## 2020-07-05 RX ADMIN — MEROPENEM SCH MLS/HR: 1 INJECTION, POWDER, FOR SOLUTION INTRAVENOUS at 12:35

## 2020-07-05 RX ADMIN — ALBUTEROL SULFATE SCH MG: 2.5 SOLUTION RESPIRATORY (INHALATION) at 13:46

## 2020-07-05 RX ADMIN — METHYLPREDNISOLONE SODIUM SUCCINATE SCH MG: 40 INJECTION, POWDER, FOR SOLUTION INTRAMUSCULAR; INTRAVENOUS at 06:50

## 2020-07-05 RX ADMIN — IPRATROPIUM BROMIDE SCH MG: 0.5 SOLUTION RESPIRATORY (INHALATION) at 13:46

## 2020-07-05 RX ADMIN — HUMAN INSULIN SCH UNITS: 100 INJECTION, SOLUTION SUBCUTANEOUS at 12:00

## 2020-07-05 RX ADMIN — HUMAN INSULIN SCH UNITS: 100 INJECTION, SOLUTION SUBCUTANEOUS at 00:49

## 2020-07-05 RX ADMIN — Medication SCH STRIP: at 00:49

## 2020-07-05 RX ADMIN — IPRATROPIUM BROMIDE SCH MG: 0.5 SOLUTION RESPIRATORY (INHALATION) at 06:16

## 2020-07-05 RX ADMIN — ALBUTEROL SULFATE SCH MG: 2.5 SOLUTION RESPIRATORY (INHALATION) at 06:15

## 2020-07-05 RX ADMIN — IPRATROPIUM BROMIDE SCH MG: 0.5 SOLUTION RESPIRATORY (INHALATION) at 10:20

## 2020-07-05 RX ADMIN — HUMAN INSULIN SCH UNITS: 100 INJECTION, SOLUTION SUBCUTANEOUS at 06:00

## 2020-07-05 RX ADMIN — Medication SCH STRIP: at 06:51

## 2020-07-05 RX ADMIN — FUROSEMIDE SCH MG: 10 INJECTION, SOLUTION INTRAMUSCULAR; INTRAVENOUS at 10:00

## 2020-07-05 RX ADMIN — FUROSEMIDE SCH MG: 10 INJECTION, SOLUTION INTRAMUSCULAR; INTRAVENOUS at 22:30

## 2020-07-05 RX ADMIN — ALBUTEROL SULFATE SCH MG: 2.5 SOLUTION RESPIRATORY (INHALATION) at 02:45

## 2020-07-05 RX ADMIN — ALBUTEROL SULFATE SCH MG: 2.5 SOLUTION RESPIRATORY (INHALATION) at 22:27

## 2020-07-05 RX ADMIN — HYDROCODONE BITARTRATE AND ACETAMINOPHEN PRN TAB: 5; 325 TABLET ORAL at 18:42

## 2020-07-05 RX ADMIN — PANTOPRAZOLE SODIUM SCH MG: 40 INJECTION, POWDER, FOR SOLUTION INTRAVENOUS at 10:00

## 2020-07-05 RX ADMIN — ALBUTEROL SULFATE SCH MG: 2.5 SOLUTION RESPIRATORY (INHALATION) at 18:47

## 2020-07-05 RX ADMIN — HYDROCODONE BITARTRATE AND ACETAMINOPHEN PRN TAB: 5; 325 TABLET ORAL at 11:56

## 2020-07-05 RX ADMIN — SUCRALFATE SCH GM: 1 SUSPENSION ORAL at 22:30

## 2020-07-05 RX ADMIN — ALBUTEROL SULFATE SCH MG: 2.5 SOLUTION RESPIRATORY (INHALATION) at 10:19

## 2020-07-05 RX ADMIN — PANTOPRAZOLE SODIUM SCH MG: 40 INJECTION, POWDER, FOR SOLUTION INTRAVENOUS at 22:30

## 2020-07-05 RX ADMIN — HUMAN INSULIN SCH UNITS: 100 INJECTION, SOLUTION SUBCUTANEOUS at 17:38

## 2020-07-05 RX ADMIN — VANCOMYCIN HYDROCHLORIDE SCH MLS/HR: 1 INJECTION, POWDER, LYOPHILIZED, FOR SOLUTION INTRAVENOUS at 19:53

## 2020-07-05 RX ADMIN — SUCRALFATE SCH GM: 1 SUSPENSION ORAL at 17:38

## 2020-07-05 RX ADMIN — Medication SCH STRIP: at 12:19

## 2020-07-05 RX ADMIN — IPRATROPIUM BROMIDE SCH MG: 0.5 SOLUTION RESPIRATORY (INHALATION) at 18:47

## 2020-07-05 RX ADMIN — VANCOMYCIN HYDROCHLORIDE SCH MLS/HR: 1 INJECTION, POWDER, LYOPHILIZED, FOR SOLUTION INTRAVENOUS at 08:00

## 2020-07-05 NOTE — NUR
Patient refused insulin administration stating she does not normally have increased blood 
sugars. Patient educated on risk and benefits, patient verbalized understanding. Patient 
refused.

## 2020-07-05 NOTE — NUR
Opening Shift Note

Received report on the patient. Awake lying in bed. Patient shows no signs of distress at 
this time. Tried to discuss the plan of care with the patient, but there was a language 
barrier. Bed in lowest position, side rails up x2, and the call light is within reach.

## 2020-07-05 NOTE — NUR
Chemistry called patient has critical lab value V[place] Trough 31.8, pharmacy notified. 
Medication held.

## 2020-07-05 NOTE — NUR
Opening Shift Note

Assumed care of patient, awake and alert.  No S/S of distress/SOB or pain. Safety measures 
in place bed in lowest position, side rails up x2, call light within reach, and fall alarm 
on.  Instructed on POC and to call for assist PRN, will continue to monitor for changes Q1hr 
and PRN.

## 2020-07-05 NOTE — NUR
Nutrition Followup Notes



Wt: 80.9 kg



Pt`s extubated, transferred out of ICU. Pt is currently NPO with Clinimix running @ 52 
ml/hr. Pt with no distress per RN doc. Will continue to monitor PO status, skin status, 
pertinent labs and weight trends. Will f/u in 2-3 days.



Est. Energy Needs ABW 62 [id number] kcal (23-25 kcal/kg BW), 

Est. Protein Needs:  62-74 gms/day (1.0-1.2 gms/kg ABW r/t hypoalb).

Will continue to monitor and reassess prn.



LABS: Gluc 140 H, CA 8.3 L  ALB 2.9 L.



GI: Pt with no BM reported today per RN doc.



BS: 14 mod risk. Please refer to wound assessment report for full details.



PES: 1) Altered nutrition related lab values r.t current chronic medical condition aeb 
severe hypoalb hypocalcemia

Impaired swallowing r.t current medical condition aeb pt`s intubated sedated with order of 
NPO



Comments 

1) advance diet as medically feasible. 

2) consider PN support to meet > 75% of needs if pt continues to be npo. 

3) continue current plan of care

## 2020-07-05 NOTE — NUR
RT NOTE

PT WAS SEEN BY RT FOR HHN TX. PT TOLERATES WELL VIA MASK. NO ADVERSE REACTION NOTED. NEW HHN 
SET UP PROVIDED. CONT AS ORDERED

-------------------------------------------------------------------------------

Addendum: 07/05/20 at 2230 by Ann Marie Marques RT

-------------------------------------------------------------------------------

Amended: Links added.

## 2020-07-05 NOTE — NUR
RT NOTE

PT WAS SEEN BY RT FOR HHN TX. PT TOLERATES WELL VIA MASK. NO ADVERSE REACTION NOTED. CONT AS 
ORDERED

-------------------------------------------------------------------------------

Addendum: 07/05/20 at 1851 by Ann Marie Marques RT

-------------------------------------------------------------------------------

Amended: Links added.

## 2020-07-06 VITALS — SYSTOLIC BLOOD PRESSURE: 109 MMHG | DIASTOLIC BLOOD PRESSURE: 49 MMHG

## 2020-07-06 VITALS — SYSTOLIC BLOOD PRESSURE: 105 MMHG | DIASTOLIC BLOOD PRESSURE: 52 MMHG

## 2020-07-06 VITALS — SYSTOLIC BLOOD PRESSURE: 125 MMHG | DIASTOLIC BLOOD PRESSURE: 53 MMHG

## 2020-07-06 VITALS — SYSTOLIC BLOOD PRESSURE: 150 MMHG | DIASTOLIC BLOOD PRESSURE: 78 MMHG

## 2020-07-06 VITALS — SYSTOLIC BLOOD PRESSURE: 139 MMHG | DIASTOLIC BLOOD PRESSURE: 65 MMHG

## 2020-07-06 VITALS — DIASTOLIC BLOOD PRESSURE: 61 MMHG | SYSTOLIC BLOOD PRESSURE: 126 MMHG

## 2020-07-06 LAB
ANION GAP SERPL CALCULATED.3IONS-SCNC: 2 MMOL/L (ref 5–15)
BUN SERPL-MCNC: 35 MG/DL (ref 7–18)
BUN/CREAT SERPL: 54.7
CALCIUM SERPL-MCNC: 8.2 MG/DL (ref 8.5–10.1)
CHLORIDE SERPL-SCNC: 106 MMOL/L (ref 98–107)
CO2 SERPL-SCNC: 34 MMOL/L (ref 21–32)
GLUCOSE SERPL-MCNC: 82 MG/DL (ref 74–106)
HCT VFR BLD AUTO: 28.7 % (ref 36–46)
HGB BLD-MCNC: 9.2 G/DL (ref 12.2–16.2)
MCH RBC QN AUTO: 25.8 PG (ref 28–32)
MCV RBC AUTO: 80.3 FL (ref 80–100)
NRBC BLD QL AUTO: 0.1 %
POTASSIUM SERPL-SCNC: 3.3 MMOL/L (ref 3.5–5.1)
SODIUM SERPL-SCNC: 142 MMOL/L (ref 136–145)

## 2020-07-06 RX ADMIN — HUMAN INSULIN SCH UNITS: 100 INJECTION, SOLUTION SUBCUTANEOUS at 11:47

## 2020-07-06 RX ADMIN — IPRATROPIUM BROMIDE SCH MG: 0.5 SOLUTION RESPIRATORY (INHALATION) at 10:12

## 2020-07-06 RX ADMIN — HUMAN INSULIN SCH UNITS: 100 INJECTION, SOLUTION SUBCUTANEOUS at 00:00

## 2020-07-06 RX ADMIN — IPRATROPIUM BROMIDE SCH MG: 0.5 SOLUTION RESPIRATORY (INHALATION) at 22:25

## 2020-07-06 RX ADMIN — SUCRALFATE SCH GM: 1 SUSPENSION ORAL at 11:30

## 2020-07-06 RX ADMIN — IPRATROPIUM BROMIDE SCH MG: 0.5 SOLUTION RESPIRATORY (INHALATION) at 02:25

## 2020-07-06 RX ADMIN — PANTOPRAZOLE SODIUM SCH MG: 40 INJECTION, POWDER, FOR SOLUTION INTRAVENOUS at 10:00

## 2020-07-06 RX ADMIN — MEROPENEM SCH MLS/HR: 1 INJECTION, POWDER, FOR SOLUTION INTRAVENOUS at 10:00

## 2020-07-06 RX ADMIN — Medication SCH STRIP: at 05:46

## 2020-07-06 RX ADMIN — FUROSEMIDE SCH MG: 10 INJECTION, SOLUTION INTRAMUSCULAR; INTRAVENOUS at 10:00

## 2020-07-06 RX ADMIN — MEROPENEM SCH MLS/HR: 1 INJECTION, POWDER, FOR SOLUTION INTRAVENOUS at 23:05

## 2020-07-06 RX ADMIN — HYDROCODONE BITARTRATE AND ACETAMINOPHEN PRN TAB: 5; 325 TABLET ORAL at 05:47

## 2020-07-06 RX ADMIN — HUMAN INSULIN SCH UNITS: 100 INJECTION, SOLUTION SUBCUTANEOUS at 17:57

## 2020-07-06 RX ADMIN — ALBUTEROL SULFATE SCH MG: 2.5 SOLUTION RESPIRATORY (INHALATION) at 19:15

## 2020-07-06 RX ADMIN — ALBUTEROL SULFATE SCH MG: 2.5 SOLUTION RESPIRATORY (INHALATION) at 22:25

## 2020-07-06 RX ADMIN — HYDROCODONE BITARTRATE AND ACETAMINOPHEN PRN TAB: 5; 325 TABLET ORAL at 18:38

## 2020-07-06 RX ADMIN — SUCRALFATE SCH GM: 1 SUSPENSION ORAL at 06:52

## 2020-07-06 RX ADMIN — IPRATROPIUM BROMIDE SCH MG: 0.5 SOLUTION RESPIRATORY (INHALATION) at 19:15

## 2020-07-06 RX ADMIN — ALBUTEROL SULFATE SCH MG: 2.5 SOLUTION RESPIRATORY (INHALATION) at 10:12

## 2020-07-06 RX ADMIN — ALBUTEROL SULFATE SCH MG: 2.5 SOLUTION RESPIRATORY (INHALATION) at 14:26

## 2020-07-06 RX ADMIN — Medication SCH STRIP: at 17:57

## 2020-07-06 RX ADMIN — HUMAN INSULIN SCH UNITS: 100 INJECTION, SOLUTION SUBCUTANEOUS at 05:58

## 2020-07-06 RX ADMIN — SUCRALFATE SCH GM: 1 SUSPENSION ORAL at 17:00

## 2020-07-06 RX ADMIN — Medication SCH STRIP: at 00:16

## 2020-07-06 RX ADMIN — FUROSEMIDE SCH MG: 10 INJECTION, SOLUTION INTRAMUSCULAR; INTRAVENOUS at 22:35

## 2020-07-06 RX ADMIN — ALBUTEROL SULFATE SCH MG: 2.5 SOLUTION RESPIRATORY (INHALATION) at 02:25

## 2020-07-06 RX ADMIN — PANTOPRAZOLE SODIUM SCH MG: 40 INJECTION, POWDER, FOR SOLUTION INTRAVENOUS at 22:36

## 2020-07-06 RX ADMIN — Medication SCH STRIP: at 11:48

## 2020-07-06 RX ADMIN — SUCRALFATE SCH GM: 1 SUSPENSION ORAL at 22:00

## 2020-07-06 RX ADMIN — ALBUTEROL SULFATE SCH MG: 2.5 SOLUTION RESPIRATORY (INHALATION) at 06:32

## 2020-07-06 RX ADMIN — IPRATROPIUM BROMIDE SCH MG: 0.5 SOLUTION RESPIRATORY (INHALATION) at 06:32

## 2020-07-06 RX ADMIN — IPRATROPIUM BROMIDE SCH MG: 0.5 SOLUTION RESPIRATORY (INHALATION) at 14:25

## 2020-07-06 NOTE — NUR
RT NOTE

PT WAS SEEN BY RT FOR HHN TX. PT IS SLEEPING BUT EASILY AWAKENED. NO SWOB OR DISTRESS NOTED. 
PT REFUSED HHN TX AT THIS TIME TO SLEEP. CONT AS ORDERED

## 2020-07-07 VITALS — SYSTOLIC BLOOD PRESSURE: 113 MMHG | DIASTOLIC BLOOD PRESSURE: 69 MMHG

## 2020-07-07 VITALS — DIASTOLIC BLOOD PRESSURE: 70 MMHG | SYSTOLIC BLOOD PRESSURE: 134 MMHG

## 2020-07-07 VITALS — SYSTOLIC BLOOD PRESSURE: 134 MMHG | DIASTOLIC BLOOD PRESSURE: 67 MMHG

## 2020-07-07 VITALS — SYSTOLIC BLOOD PRESSURE: 126 MMHG | DIASTOLIC BLOOD PRESSURE: 69 MMHG

## 2020-07-07 LAB
ANION GAP SERPL CALCULATED.3IONS-SCNC: 6 MMOL/L (ref 5–15)
BUN SERPL-MCNC: 31 MG/DL (ref 7–18)
BUN/CREAT SERPL: 47
CALCIUM SERPL-MCNC: 7.9 MG/DL (ref 8.5–10.1)
CHLORIDE SERPL-SCNC: 102 MMOL/L (ref 98–107)
CO2 SERPL-SCNC: 32 MMOL/L (ref 21–32)
GLUCOSE SERPL-MCNC: 90 MG/DL (ref 74–106)
HCT VFR BLD AUTO: 30.1 % (ref 36–46)
HGB BLD-MCNC: 9.5 G/DL (ref 12.2–16.2)
MCH RBC QN AUTO: 26.2 PG (ref 28–32)
MCV RBC AUTO: 83.1 FL (ref 80–100)
NRBC BLD QL AUTO: 0.1 %
POTASSIUM SERPL-SCNC: 3.4 MMOL/L (ref 3.5–5.1)
SODIUM SERPL-SCNC: 140 MMOL/L (ref 136–145)

## 2020-07-07 RX ADMIN — IPRATROPIUM BROMIDE SCH MG: 0.5 SOLUTION RESPIRATORY (INHALATION) at 14:13

## 2020-07-07 RX ADMIN — HUMAN INSULIN SCH UNITS: 100 INJECTION, SOLUTION SUBCUTANEOUS at 12:00

## 2020-07-07 RX ADMIN — MEROPENEM SCH MLS/HR: 1 INJECTION, POWDER, FOR SOLUTION INTRAVENOUS at 11:11

## 2020-07-07 RX ADMIN — IPRATROPIUM BROMIDE SCH MG: 0.5 SOLUTION RESPIRATORY (INHALATION) at 10:56

## 2020-07-07 RX ADMIN — ALBUTEROL SULFATE SCH MG: 2.5 SOLUTION RESPIRATORY (INHALATION) at 07:07

## 2020-07-07 RX ADMIN — Medication SCH STRIP: at 17:34

## 2020-07-07 RX ADMIN — HUMAN INSULIN SCH UNITS: 100 INJECTION, SOLUTION SUBCUTANEOUS at 17:33

## 2020-07-07 RX ADMIN — IPRATROPIUM BROMIDE SCH MG: 0.5 SOLUTION RESPIRATORY (INHALATION) at 07:07

## 2020-07-07 RX ADMIN — Medication SCH STRIP: at 06:00

## 2020-07-07 RX ADMIN — Medication SCH STRIP: at 12:00

## 2020-07-07 RX ADMIN — Medication SCH STRIP: at 00:00

## 2020-07-07 RX ADMIN — HUMAN INSULIN SCH UNITS: 100 INJECTION, SOLUTION SUBCUTANEOUS at 06:00

## 2020-07-07 RX ADMIN — HUMAN INSULIN SCH UNITS: 100 INJECTION, SOLUTION SUBCUTANEOUS at 00:00

## 2020-07-07 RX ADMIN — ALBUTEROL SULFATE SCH MG: 2.5 SOLUTION RESPIRATORY (INHALATION) at 14:13

## 2020-07-07 RX ADMIN — ALBUTEROL SULFATE SCH MG: 2.5 SOLUTION RESPIRATORY (INHALATION) at 10:56

## 2020-07-07 RX ADMIN — PANTOPRAZOLE SODIUM SCH MG: 40 INJECTION, POWDER, FOR SOLUTION INTRAVENOUS at 10:16

## 2020-07-07 RX ADMIN — HYDROCODONE BITARTRATE AND ACETAMINOPHEN PRN TAB: 5; 325 TABLET ORAL at 03:36

## 2020-07-07 RX ADMIN — SUCRALFATE SCH GM: 1 SUSPENSION ORAL at 17:00

## 2020-07-07 RX ADMIN — SUCRALFATE SCH GM: 1 SUSPENSION ORAL at 07:00

## 2020-07-07 RX ADMIN — ALBUTEROL SULFATE SCH MG: 2.5 SOLUTION RESPIRATORY (INHALATION) at 02:30

## 2020-07-07 RX ADMIN — FUROSEMIDE SCH MG: 10 INJECTION, SOLUTION INTRAMUSCULAR; INTRAVENOUS at 10:15

## 2020-07-07 RX ADMIN — SUCRALFATE SCH GM: 1 SUSPENSION ORAL at 11:22

## 2020-07-07 RX ADMIN — IPRATROPIUM BROMIDE SCH MG: 0.5 SOLUTION RESPIRATORY (INHALATION) at 02:30

## 2020-07-07 NOTE — NUR
Nutrition Followup Notes



Wt: 79.8 kg



Pt was sleeping at time of rounds.  pt diet advanced to full liq on  and pt appetite is 
good aeb pt with adequate po intake of 85% avg per RN note since diet advanced.  



Est. Energy Needs ABW 62 [id number] kcal (23-25 kcal/kg BW), 

Est. Protein Needs:  62-74 gms/day (1.0-1.2 gms/kg ABW r/t hypoalb).

Will continue to monitor and reassess prn.



LABS: BUN 31H, Ca 7.9L, Alb 2.1L 



GI: Pt with no BM reported today per RN doc.



BS: 14 mod risk. Please refer to wound assessment report for full details.



PES: 1) Altered nutrition related lab values r.t current chronic medical condition aeb 
severe hypoalb hypocalcemia

Impaired swallowing r.t current medical condition aeb pt`s intubated sedated with order of 
NPO



Comments 

1) advance diet as medically feasible

2) consider PN support to meet > 75% of needs if pt continues to be npo. 

3) continue current plan of care

## 2020-07-07 NOTE — NUR
UNABLE TO SIGN D/C INFORMATION, TRANSFER AND BELONGING LIST, D/C INFORMATION AND EDUCATION 
PROVIDED, D/C ENVELOPE HANDED TO THE TRANSPORTATION PERSONAL, D/C TELE, D/C ZAYAS CATH 
VOIDED X1 POST D/C, D/C CENTRAL LINE FROM RT. GROIN, TOLERATED WELL, NOT IN DISTRESS, DENIED 
PAIN, VS T=98.2 RR=18 SAT=94% P=68 GI=395/72, D/C TO [facility], TOOL ALL 
BELONGINGS AND LEFT NOTHING BEHIND.

## 2020-07-07 NOTE — NUR
D/C Planning 



Per  consult for SNF placement. Faxed clinical information to [facility]. Per 
Adilene with [facility] Ph:( [phone]) patient has been accepted to room 
202 bed 3 accepting Md, Dr. TABATHA Andrews. Transportation has been arranged with [facility] Ph:([phone]) via gurney with oxygen,  time 18:00. Informed SANTOS Robins.

## 2020-07-07 NOTE — NUR
OUT OF BED TO THE CHAIR FOR TWO HOURS, TOLERATING WELL, TOLERATED 100% OF FULL LIQUID DIET 
WELL, NOT IN DISTRESS SITTING ON THE CHAIR, PENDING D/C, WILL CONTINUE MONITORING.

## 2020-07-07 NOTE — NUR
RECEIVED PATIENT ALERT AND ORIENTED X4, Chinese SPEAKING ONLY, NOT IN DISTRESS, CLEAR LS IN 
BILATERAL UPPER AND DIMINISHED IN LOWER LUNG SOUNDS, RR=18 SAT=93%, DEEP BREATHING AND 
COUGHING WAS ENCOURAGED, DEMONSTRATED UNDERSTANDING, DENIED SOB AND CHEST PAIN AT THIS 
MOMENT, PACING R=90 ON TELE MONITOR, ABDOMEN SOFT WITH ACTIVE BS IN ALL FOUR QUADRANTS, LAST 
BM ON 7/2/20 AS REPORTED, ZAYAS CATH IN PLACE AND PATENT, DRAINING CLEAR YELLOW URINE, 
SACRUM COVERED WITH PROTECTIVE DRY AND INTACT OPTIFOAM DRESSING, RT, GROIN TRIPLE LUMEN 
CENTRAL LINE IN PLACE AND PATENT, COVERED WITH DRY AND INTACT DRESSING, SKIN INTACT WARM TO 
TOUCH, RESTING ON BED, HEAD OF BED ELEVATED, BED ON LOW POSITION, RAILS UP X2, CALL LIGHT ON 
REACH, PENDING PT, WILL CONTINUE MONITORING.

## 2020-07-07 NOTE — NUR
CONTACT WAS UNABLE TO REACH  [phone] [phone], NO VOICE MAIL TO LIVE A MESSAGE, REPORT WAS 
GIVEN TO THE RECEIVING NURSE IN [facility] [phone] ROOM 202 BED #3, 
INFORMATION WAS REPEATED AND VERIFIED, VERBALIZED UNDERSTANDING, WILL CONTINUE MONITORING.

## 2021-09-26 ENCOUNTER — HOSPITAL ENCOUNTER (INPATIENT)
Dept: HOSPITAL 15 - ER | Age: 81
LOS: 4 days | Discharge: HOME | DRG: 291 | End: 2021-09-30
Attending: SPECIALIST | Admitting: SPECIALIST
Payer: MEDICARE

## 2021-09-26 VITALS — SYSTOLIC BLOOD PRESSURE: 142 MMHG | DIASTOLIC BLOOD PRESSURE: 74 MMHG

## 2021-09-26 VITALS — BODY MASS INDEX: 30.02 KG/M2 | WEIGHT: 163.14 LBS | HEIGHT: 62 IN

## 2021-09-26 DIAGNOSIS — I50.31: ICD-10-CM

## 2021-09-26 DIAGNOSIS — J06.9: ICD-10-CM

## 2021-09-26 DIAGNOSIS — Z88.0: ICD-10-CM

## 2021-09-26 DIAGNOSIS — J44.1: ICD-10-CM

## 2021-09-26 DIAGNOSIS — Z88.2: ICD-10-CM

## 2021-09-26 DIAGNOSIS — K21.9: ICD-10-CM

## 2021-09-26 DIAGNOSIS — I11.0: Primary | ICD-10-CM

## 2021-09-26 DIAGNOSIS — E11.9: ICD-10-CM

## 2021-09-26 DIAGNOSIS — Z20.822: ICD-10-CM

## 2021-09-26 LAB
ALBUMIN SERPL-MCNC: 2.9 G/DL (ref 3.4–5)
ALP SERPL-CCNC: 93 U/L (ref 45–117)
ALT SERPL-CCNC: 17 U/L (ref 13–56)
ANION GAP SERPL CALCULATED.3IONS-SCNC: 8 MMOL/L (ref 5–15)
BILIRUB SERPL-MCNC: 0.2 MG/DL (ref 0.2–1)
BUN SERPL-MCNC: 17 MG/DL (ref 7–18)
BUN/CREAT SERPL: 21.3
CALCIUM SERPL-MCNC: 8.5 MG/DL (ref 8.5–10.1)
CHLORIDE SERPL-SCNC: 108 MMOL/L (ref 98–107)
CO2 SERPL-SCNC: 22 MMOL/L (ref 21–32)
GLUCOSE SERPL-MCNC: 135 MG/DL (ref 74–106)
HCT VFR BLD AUTO: 33.5 % (ref 36–46)
HGB BLD-MCNC: 10.8 G/DL (ref 12.2–16.2)
MCH RBC QN AUTO: 23.9 PG (ref 28–32)
MCV RBC AUTO: 73.9 FL (ref 80–100)
NRBC BLD QL AUTO: 0.2 %
POTASSIUM SERPL-SCNC: 4.2 MMOL/L (ref 3.5–5.1)
PROT SERPL-MCNC: 6.6 G/DL (ref 6.4–8.2)
SODIUM SERPL-SCNC: 138 MMOL/L (ref 136–145)

## 2021-09-26 PROCEDURE — 80053 COMPREHEN METABOLIC PANEL: CPT

## 2021-09-26 PROCEDURE — 71045 X-RAY EXAM CHEST 1 VIEW: CPT

## 2021-09-26 PROCEDURE — 87081 CULTURE SCREEN ONLY: CPT

## 2021-09-26 PROCEDURE — 85025 COMPLETE CBC W/AUTO DIFF WBC: CPT

## 2021-09-26 PROCEDURE — 84484 ASSAY OF TROPONIN QUANT: CPT

## 2021-09-26 PROCEDURE — 94640 AIRWAY INHALATION TREATMENT: CPT

## 2021-09-26 PROCEDURE — 36415 COLL VENOUS BLD VENIPUNCTURE: CPT

## 2021-09-26 PROCEDURE — 87426 SARSCOV CORONAVIRUS AG IA: CPT

## 2021-09-26 PROCEDURE — 82565 ASSAY OF CREATININE: CPT

## 2021-09-26 PROCEDURE — 83880 ASSAY OF NATRIURETIC PEPTIDE: CPT

## 2021-09-26 RX ADMIN — HYDROCODONE BITARTRATE AND ACETAMINOPHEN PRN TAB: 5; 325 TABLET ORAL at 20:06

## 2021-09-26 RX ADMIN — ALBUTEROL SULFATE SCH MG: 2.5 SOLUTION RESPIRATORY (INHALATION) at 19:09

## 2021-09-26 RX ADMIN — PANTOPRAZOLE SODIUM SCH MG: 40 INJECTION, POWDER, FOR SOLUTION INTRAVENOUS at 23:28

## 2021-09-26 RX ADMIN — ALBUTEROL SULFATE SCH MG: 2.5 SOLUTION RESPIRATORY (INHALATION) at 22:18

## 2021-09-26 RX ADMIN — ATENOLOL SCH MG: 25 TABLET ORAL at 23:28

## 2021-09-27 VITALS — DIASTOLIC BLOOD PRESSURE: 53 MMHG | SYSTOLIC BLOOD PRESSURE: 119 MMHG

## 2021-09-27 VITALS — DIASTOLIC BLOOD PRESSURE: 56 MMHG | SYSTOLIC BLOOD PRESSURE: 142 MMHG

## 2021-09-27 VITALS — DIASTOLIC BLOOD PRESSURE: 46 MMHG | SYSTOLIC BLOOD PRESSURE: 99 MMHG

## 2021-09-27 VITALS — SYSTOLIC BLOOD PRESSURE: 112 MMHG | DIASTOLIC BLOOD PRESSURE: 62 MMHG

## 2021-09-27 VITALS — SYSTOLIC BLOOD PRESSURE: 130 MMHG | DIASTOLIC BLOOD PRESSURE: 68 MMHG

## 2021-09-27 VITALS — DIASTOLIC BLOOD PRESSURE: 58 MMHG | SYSTOLIC BLOOD PRESSURE: 101 MMHG

## 2021-09-27 PROCEDURE — 05HB33Z INSERTION OF INFUSION DEVICE INTO RIGHT BASILIC VEIN, PERCUTANEOUS APPROACH: ICD-10-PCS | Performed by: SPECIALIST

## 2021-09-27 PROCEDURE — B54MZZA ULTRASONOGRAPHY OF RIGHT UPPER EXTREMITY VEINS, GUIDANCE: ICD-10-PCS | Performed by: SPECIALIST

## 2021-09-27 RX ADMIN — ALBUTEROL SULFATE SCH MG: 2.5 SOLUTION RESPIRATORY (INHALATION) at 18:30

## 2021-09-27 RX ADMIN — ALBUTEROL SULFATE SCH MG: 2.5 SOLUTION RESPIRATORY (INHALATION) at 22:00

## 2021-09-27 RX ADMIN — ALBUTEROL SULFATE SCH MG: 2.5 SOLUTION RESPIRATORY (INHALATION) at 10:28

## 2021-09-27 RX ADMIN — ATENOLOL SCH MG: 25 TABLET ORAL at 20:42

## 2021-09-27 RX ADMIN — HYDROCODONE BITARTRATE AND ACETAMINOPHEN PRN TAB: 5; 325 TABLET ORAL at 03:26

## 2021-09-27 RX ADMIN — ENOXAPARIN SODIUM SCH MG: 40 INJECTION SUBCUTANEOUS at 09:13

## 2021-09-27 RX ADMIN — ALBUTEROL SULFATE SCH MG: 2.5 SOLUTION RESPIRATORY (INHALATION) at 07:20

## 2021-09-27 RX ADMIN — ALBUTEROL SULFATE SCH MG: 2.5 SOLUTION RESPIRATORY (INHALATION) at 13:34

## 2021-09-27 RX ADMIN — PANTOPRAZOLE SODIUM SCH MG: 40 INJECTION, POWDER, FOR SOLUTION INTRAVENOUS at 09:13

## 2021-09-27 RX ADMIN — ALBUTEROL SULFATE SCH MG: 2.5 SOLUTION RESPIRATORY (INHALATION) at 02:28

## 2021-09-27 RX ADMIN — PANTOPRAZOLE SODIUM SCH MG: 40 INJECTION, POWDER, FOR SOLUTION INTRAVENOUS at 20:42

## 2021-09-27 RX ADMIN — METHYLPREDNISOLONE SODIUM SUCCINATE SCH MG: 40 INJECTION, POWDER, FOR SOLUTION INTRAMUSCULAR; INTRAVENOUS at 09:13

## 2021-09-27 RX ADMIN — ALBUTEROL SULFATE SCH MG: 2.5 SOLUTION RESPIRATORY (INHALATION) at 22:09

## 2021-09-27 RX ADMIN — HYDROCODONE BITARTRATE AND ACETAMINOPHEN PRN TAB: 5; 325 TABLET ORAL at 15:22

## 2021-09-27 RX ADMIN — PROMETHAZINE HYDROCHLORIDE AND CODEINE PHOSPHATE PRN ML: 6.25; 1 SOLUTION ORAL at 11:30

## 2021-09-27 RX ADMIN — HYDROCODONE BITARTRATE AND ACETAMINOPHEN PRN TAB: 5; 325 TABLET ORAL at 20:42

## 2021-09-27 RX ADMIN — HYDROCODONE BITARTRATE AND ACETAMINOPHEN PRN TAB: 5; 325 TABLET ORAL at 11:30

## 2021-09-27 RX ADMIN — AZITHROMYCIN DIHYDRATE SCH MLS/HR: 500 INJECTION, POWDER, LYOPHILIZED, FOR SOLUTION INTRAVENOUS at 09:50

## 2021-09-27 RX ADMIN — ATENOLOL SCH MG: 25 TABLET ORAL at 09:51

## 2021-09-28 VITALS — DIASTOLIC BLOOD PRESSURE: 71 MMHG | SYSTOLIC BLOOD PRESSURE: 153 MMHG

## 2021-09-28 VITALS — DIASTOLIC BLOOD PRESSURE: 49 MMHG | SYSTOLIC BLOOD PRESSURE: 100 MMHG

## 2021-09-28 VITALS — SYSTOLIC BLOOD PRESSURE: 103 MMHG | DIASTOLIC BLOOD PRESSURE: 48 MMHG

## 2021-09-28 VITALS — SYSTOLIC BLOOD PRESSURE: 136 MMHG | DIASTOLIC BLOOD PRESSURE: 73 MMHG

## 2021-09-28 VITALS — DIASTOLIC BLOOD PRESSURE: 58 MMHG | SYSTOLIC BLOOD PRESSURE: 95 MMHG

## 2021-09-28 RX ADMIN — HYDROCODONE BITARTRATE AND ACETAMINOPHEN PRN TAB: 5; 325 TABLET ORAL at 06:31

## 2021-09-28 RX ADMIN — PROMETHAZINE HYDROCHLORIDE AND CODEINE PHOSPHATE PRN ML: 6.25; 1 SOLUTION ORAL at 20:52

## 2021-09-28 RX ADMIN — AZITHROMYCIN DIHYDRATE SCH MLS/HR: 500 INJECTION, POWDER, LYOPHILIZED, FOR SOLUTION INTRAVENOUS at 11:08

## 2021-09-28 RX ADMIN — PANTOPRAZOLE SODIUM SCH MG: 40 INJECTION, POWDER, FOR SOLUTION INTRAVENOUS at 11:08

## 2021-09-28 RX ADMIN — ALBUTEROL SULFATE SCH MG: 2.5 SOLUTION RESPIRATORY (INHALATION) at 06:26

## 2021-09-28 RX ADMIN — ALBUTEROL SULFATE SCH MG: 2.5 SOLUTION RESPIRATORY (INHALATION) at 10:01

## 2021-09-28 RX ADMIN — ENOXAPARIN SODIUM SCH MG: 40 INJECTION SUBCUTANEOUS at 11:09

## 2021-09-28 RX ADMIN — HYDROCODONE BITARTRATE AND ACETAMINOPHEN PRN TAB: 5; 325 TABLET ORAL at 20:51

## 2021-09-28 RX ADMIN — PROMETHAZINE HYDROCHLORIDE AND CODEINE PHOSPHATE PRN ML: 6.25; 1 SOLUTION ORAL at 11:09

## 2021-09-28 RX ADMIN — PROMETHAZINE HYDROCHLORIDE AND CODEINE PHOSPHATE PRN ML: 6.25; 1 SOLUTION ORAL at 00:30

## 2021-09-28 RX ADMIN — ATENOLOL SCH MG: 25 TABLET ORAL at 20:50

## 2021-09-28 RX ADMIN — ALBUTEROL SULFATE SCH MG: 2.5 SOLUTION RESPIRATORY (INHALATION) at 22:00

## 2021-09-28 RX ADMIN — ATENOLOL SCH MG: 25 TABLET ORAL at 11:08

## 2021-09-28 RX ADMIN — METHYLPREDNISOLONE SODIUM SUCCINATE SCH MG: 40 INJECTION, POWDER, FOR SOLUTION INTRAMUSCULAR; INTRAVENOUS at 11:08

## 2021-09-28 RX ADMIN — ALBUTEROL SULFATE SCH MG: 2.5 SOLUTION RESPIRATORY (INHALATION) at 18:57

## 2021-09-28 RX ADMIN — ALBUTEROL SULFATE SCH MG: 2.5 SOLUTION RESPIRATORY (INHALATION) at 23:57

## 2021-09-28 RX ADMIN — ALBUTEROL SULFATE SCH MG: 2.5 SOLUTION RESPIRATORY (INHALATION) at 13:35

## 2021-09-28 RX ADMIN — PANTOPRAZOLE SODIUM SCH MG: 40 INJECTION, POWDER, FOR SOLUTION INTRAVENOUS at 20:49

## 2021-09-28 RX ADMIN — PROMETHAZINE HYDROCHLORIDE AND CODEINE PHOSPHATE PRN ML: 6.25; 1 SOLUTION ORAL at 05:38

## 2021-09-29 VITALS — SYSTOLIC BLOOD PRESSURE: 117 MMHG | DIASTOLIC BLOOD PRESSURE: 62 MMHG

## 2021-09-29 VITALS — DIASTOLIC BLOOD PRESSURE: 77 MMHG | SYSTOLIC BLOOD PRESSURE: 89 MMHG

## 2021-09-29 VITALS — DIASTOLIC BLOOD PRESSURE: 73 MMHG | SYSTOLIC BLOOD PRESSURE: 116 MMHG

## 2021-09-29 VITALS — DIASTOLIC BLOOD PRESSURE: 77 MMHG | SYSTOLIC BLOOD PRESSURE: 132 MMHG

## 2021-09-29 VITALS — SYSTOLIC BLOOD PRESSURE: 116 MMHG | DIASTOLIC BLOOD PRESSURE: 73 MMHG

## 2021-09-29 VITALS — SYSTOLIC BLOOD PRESSURE: 110 MMHG | DIASTOLIC BLOOD PRESSURE: 63 MMHG

## 2021-09-29 LAB
HCT VFR BLD AUTO: 30.3 % (ref 36–46)
HGB BLD-MCNC: 9.6 G/DL (ref 12.2–16.2)
MCH RBC QN AUTO: 23.4 PG (ref 28–32)
MCV RBC AUTO: 73.8 FL (ref 80–100)
NRBC BLD QL AUTO: 0.1 %

## 2021-09-29 RX ADMIN — ATENOLOL SCH MG: 25 TABLET ORAL at 09:52

## 2021-09-29 RX ADMIN — HYDROCODONE BITARTRATE AND ACETAMINOPHEN PRN TAB: 5; 325 TABLET ORAL at 10:14

## 2021-09-29 RX ADMIN — HYDROCODONE BITARTRATE AND ACETAMINOPHEN PRN TAB: 5; 325 TABLET ORAL at 05:54

## 2021-09-29 RX ADMIN — ATENOLOL SCH MG: 25 TABLET ORAL at 19:55

## 2021-09-29 RX ADMIN — AZITHROMYCIN DIHYDRATE SCH MLS/HR: 500 INJECTION, POWDER, LYOPHILIZED, FOR SOLUTION INTRAVENOUS at 10:00

## 2021-09-29 RX ADMIN — ALBUTEROL SULFATE SCH MG: 2.5 SOLUTION RESPIRATORY (INHALATION) at 06:00

## 2021-09-29 RX ADMIN — PROMETHAZINE HYDROCHLORIDE AND CODEINE PHOSPHATE PRN ML: 6.25; 1 SOLUTION ORAL at 01:34

## 2021-09-29 RX ADMIN — PROMETHAZINE HYDROCHLORIDE AND CODEINE PHOSPHATE PRN ML: 6.25; 1 SOLUTION ORAL at 19:50

## 2021-09-29 RX ADMIN — METHYLPREDNISOLONE SODIUM SUCCINATE SCH MG: 40 INJECTION, POWDER, FOR SOLUTION INTRAMUSCULAR; INTRAVENOUS at 09:52

## 2021-09-29 RX ADMIN — ALBUTEROL SULFATE SCH MG: 2.5 SOLUTION RESPIRATORY (INHALATION) at 23:23

## 2021-09-29 RX ADMIN — HYDROCODONE BITARTRATE AND ACETAMINOPHEN PRN TAB: 5; 325 TABLET ORAL at 19:49

## 2021-09-29 RX ADMIN — ALBUTEROL SULFATE SCH MG: 2.5 SOLUTION RESPIRATORY (INHALATION) at 19:23

## 2021-09-29 RX ADMIN — PROMETHAZINE HYDROCHLORIDE AND CODEINE PHOSPHATE PRN ML: 6.25; 1 SOLUTION ORAL at 10:15

## 2021-09-29 RX ADMIN — PROMETHAZINE HYDROCHLORIDE AND CODEINE PHOSPHATE PRN ML: 6.25; 1 SOLUTION ORAL at 05:54

## 2021-09-29 RX ADMIN — PANTOPRAZOLE SODIUM SCH MG: 40 INJECTION, POWDER, FOR SOLUTION INTRAVENOUS at 09:52

## 2021-09-29 RX ADMIN — ENOXAPARIN SODIUM SCH MG: 40 INJECTION SUBCUTANEOUS at 09:53

## 2021-09-29 RX ADMIN — ALBUTEROL SULFATE SCH MG: 2.5 SOLUTION RESPIRATORY (INHALATION) at 10:00

## 2021-09-30 VITALS — SYSTOLIC BLOOD PRESSURE: 113 MMHG | DIASTOLIC BLOOD PRESSURE: 61 MMHG

## 2021-09-30 VITALS — SYSTOLIC BLOOD PRESSURE: 130 MMHG | DIASTOLIC BLOOD PRESSURE: 57 MMHG

## 2021-09-30 VITALS — DIASTOLIC BLOOD PRESSURE: 76 MMHG | SYSTOLIC BLOOD PRESSURE: 143 MMHG

## 2021-09-30 RX ADMIN — ATENOLOL SCH MG: 25 TABLET ORAL at 09:30

## 2021-09-30 RX ADMIN — METHYLPREDNISOLONE SODIUM SUCCINATE SCH MG: 40 INJECTION, POWDER, FOR SOLUTION INTRAMUSCULAR; INTRAVENOUS at 09:31

## 2021-09-30 RX ADMIN — ENOXAPARIN SODIUM SCH MG: 40 INJECTION SUBCUTANEOUS at 09:30

## 2021-09-30 RX ADMIN — ALBUTEROL SULFATE SCH MG: 2.5 SOLUTION RESPIRATORY (INHALATION) at 10:00

## 2021-09-30 RX ADMIN — ALBUTEROL SULFATE SCH MG: 2.5 SOLUTION RESPIRATORY (INHALATION) at 02:00

## 2021-09-30 RX ADMIN — HYDROCODONE BITARTRATE AND ACETAMINOPHEN PRN TAB: 5; 325 TABLET ORAL at 08:56

## 2021-09-30 RX ADMIN — PROMETHAZINE HYDROCHLORIDE AND CODEINE PHOSPHATE PRN ML: 6.25; 1 SOLUTION ORAL at 00:11

## 2021-09-30 RX ADMIN — ALBUTEROL SULFATE SCH MG: 2.5 SOLUTION RESPIRATORY (INHALATION) at 07:18

## 2021-09-30 RX ADMIN — PROMETHAZINE HYDROCHLORIDE AND CODEINE PHOSPHATE PRN ML: 6.25; 1 SOLUTION ORAL at 04:30

## 2021-12-22 ENCOUNTER — HOSPITAL ENCOUNTER (EMERGENCY)
Dept: HOSPITAL 15 - ER | Age: 81
Discharge: HOME | End: 2021-12-22
Payer: MEDICARE

## 2021-12-22 VITALS — WEIGHT: 170 LBS | BODY MASS INDEX: 29.02 KG/M2 | HEIGHT: 64 IN

## 2021-12-22 VITALS — DIASTOLIC BLOOD PRESSURE: 67 MMHG | SYSTOLIC BLOOD PRESSURE: 131 MMHG

## 2021-12-22 DIAGNOSIS — E11.9: ICD-10-CM

## 2021-12-22 DIAGNOSIS — Z90.710: ICD-10-CM

## 2021-12-22 DIAGNOSIS — J45.909: ICD-10-CM

## 2021-12-22 DIAGNOSIS — K21.9: ICD-10-CM

## 2021-12-22 DIAGNOSIS — I11.0: ICD-10-CM

## 2021-12-22 DIAGNOSIS — I50.9: ICD-10-CM

## 2021-12-22 DIAGNOSIS — Z88.0: ICD-10-CM

## 2021-12-22 DIAGNOSIS — M19.90: Primary | ICD-10-CM

## 2021-12-22 DIAGNOSIS — Z88.2: ICD-10-CM

## 2021-12-22 LAB
ALBUMIN SERPL-MCNC: 2.9 G/DL (ref 3.4–5)
ALP SERPL-CCNC: 90 U/L (ref 45–117)
ALT SERPL-CCNC: 23 U/L (ref 13–56)
ANION GAP SERPL CALCULATED.3IONS-SCNC: 6 MMOL/L (ref 5–15)
BILIRUB SERPL-MCNC: 0.3 MG/DL (ref 0.2–1)
BUN SERPL-MCNC: 24 MG/DL (ref 7–18)
BUN/CREAT SERPL: 28.6
CALCIUM SERPL-MCNC: 8.2 MG/DL (ref 8.5–10.1)
CHLORIDE SERPL-SCNC: 114 MMOL/L (ref 98–107)
CO2 SERPL-SCNC: 19 MMOL/L (ref 21–32)
GLUCOSE SERPL-MCNC: 119 MG/DL (ref 74–106)
HCT VFR BLD AUTO: 35.4 % (ref 36–46)
HGB BLD-MCNC: 11.1 G/DL (ref 12.2–16.2)
MCH RBC QN AUTO: 22.8 PG (ref 28–32)
MCV RBC AUTO: 72.5 FL (ref 80–100)
NRBC BLD QL AUTO: 0 %
POTASSIUM SERPL-SCNC: 4.6 MMOL/L (ref 3.5–5.1)
PROT SERPL-MCNC: 6.4 G/DL (ref 6.4–8.2)
SODIUM SERPL-SCNC: 139 MMOL/L (ref 136–145)

## 2021-12-22 PROCEDURE — 85025 COMPLETE CBC W/AUTO DIFF WBC: CPT

## 2021-12-22 PROCEDURE — 99285 EMERGENCY DEPT VISIT HI MDM: CPT

## 2021-12-22 PROCEDURE — 93005 ELECTROCARDIOGRAM TRACING: CPT

## 2021-12-22 PROCEDURE — 36415 COLL VENOUS BLD VENIPUNCTURE: CPT

## 2021-12-22 PROCEDURE — 81001 URINALYSIS AUTO W/SCOPE: CPT

## 2021-12-22 PROCEDURE — 80053 COMPREHEN METABOLIC PANEL: CPT

## 2021-12-22 PROCEDURE — 84484 ASSAY OF TROPONIN QUANT: CPT

## 2021-12-22 PROCEDURE — 83880 ASSAY OF NATRIURETIC PEPTIDE: CPT

## 2021-12-22 PROCEDURE — 96372 THER/PROPH/DIAG INJ SC/IM: CPT

## 2021-12-22 PROCEDURE — 71045 X-RAY EXAM CHEST 1 VIEW: CPT

## 2021-12-22 RX ADMIN — MORPHINE SULFATE ONE MG: 2 INJECTION, SOLUTION INTRAMUSCULAR; INTRAVENOUS at 13:24

## 2022-01-26 ENCOUNTER — HOSPITAL ENCOUNTER (EMERGENCY)
Dept: HOSPITAL 15 - ER | Age: 82
Discharge: HOME | End: 2022-01-26
Payer: MEDICARE

## 2022-01-26 VITALS — WEIGHT: 140 LBS | BODY MASS INDEX: 24.8 KG/M2 | HEIGHT: 63 IN

## 2022-01-26 VITALS — DIASTOLIC BLOOD PRESSURE: 84 MMHG | SYSTOLIC BLOOD PRESSURE: 123 MMHG

## 2022-01-26 DIAGNOSIS — I50.9: ICD-10-CM

## 2022-01-26 DIAGNOSIS — I11.0: ICD-10-CM

## 2022-01-26 DIAGNOSIS — K21.9: ICD-10-CM

## 2022-01-26 DIAGNOSIS — J06.9: Primary | ICD-10-CM

## 2022-01-26 DIAGNOSIS — R51.9: ICD-10-CM

## 2022-01-26 DIAGNOSIS — Z90.710: ICD-10-CM

## 2022-01-26 DIAGNOSIS — Z88.0: ICD-10-CM

## 2022-01-26 DIAGNOSIS — Z88.2: ICD-10-CM

## 2022-01-26 DIAGNOSIS — J45.909: ICD-10-CM

## 2022-01-26 LAB
ALBUMIN SERPL-MCNC: 2.8 G/DL (ref 3.4–5)
ALP SERPL-CCNC: 71 U/L (ref 45–117)
ALT SERPL-CCNC: 10 U/L (ref 13–56)
ANION GAP SERPL CALCULATED.3IONS-SCNC: 7 MMOL/L (ref 5–15)
BILIRUB SERPL-MCNC: 0.2 MG/DL (ref 0.2–1)
BUN SERPL-MCNC: 17 MG/DL (ref 7–18)
BUN/CREAT SERPL: 24.3
CALCIUM SERPL-MCNC: 8.4 MG/DL (ref 8.5–10.1)
CHLORIDE SERPL-SCNC: 109 MMOL/L (ref 98–107)
CO2 SERPL-SCNC: 22 MMOL/L (ref 21–32)
GLUCOSE SERPL-MCNC: 107 MG/DL (ref 74–106)
HCT VFR BLD AUTO: 30.2 % (ref 36–46)
HGB BLD-MCNC: 9.6 G/DL (ref 12.2–16.2)
MCH RBC QN AUTO: 22 PG (ref 28–32)
MCV RBC AUTO: 69.3 FL (ref 80–100)
NRBC BLD QL AUTO: 0.1 %
POTASSIUM SERPL-SCNC: 4.4 MMOL/L (ref 3.5–5.1)
PROT SERPL-MCNC: 6.5 G/DL (ref 6.4–8.2)
SODIUM SERPL-SCNC: 138 MMOL/L (ref 136–145)

## 2022-01-26 PROCEDURE — 80053 COMPREHEN METABOLIC PANEL: CPT

## 2022-01-26 PROCEDURE — 85025 COMPLETE CBC W/AUTO DIFF WBC: CPT

## 2022-01-26 PROCEDURE — 36415 COLL VENOUS BLD VENIPUNCTURE: CPT

## 2022-01-26 PROCEDURE — 71045 X-RAY EXAM CHEST 1 VIEW: CPT

## 2022-02-23 ENCOUNTER — HOSPITAL ENCOUNTER (EMERGENCY)
Dept: HOSPITAL 15 - ER | Age: 82
LOS: 1 days | Discharge: HOME | End: 2022-02-24
Payer: MEDICARE

## 2022-02-23 VITALS — HEIGHT: 63 IN | BODY MASS INDEX: 30.12 KG/M2 | WEIGHT: 170 LBS

## 2022-02-23 DIAGNOSIS — R05.9: ICD-10-CM

## 2022-02-23 DIAGNOSIS — Z88.2: ICD-10-CM

## 2022-02-23 DIAGNOSIS — R07.2: Primary | ICD-10-CM

## 2022-02-23 DIAGNOSIS — R11.0: ICD-10-CM

## 2022-02-23 DIAGNOSIS — I50.9: ICD-10-CM

## 2022-02-23 DIAGNOSIS — E11.9: ICD-10-CM

## 2022-02-23 DIAGNOSIS — Z20.822: ICD-10-CM

## 2022-02-23 DIAGNOSIS — Z88.0: ICD-10-CM

## 2022-02-23 DIAGNOSIS — Z95.0: ICD-10-CM

## 2022-02-23 DIAGNOSIS — Z79.2: ICD-10-CM

## 2022-02-23 DIAGNOSIS — I11.0: ICD-10-CM

## 2022-02-23 DIAGNOSIS — Z79.899: ICD-10-CM

## 2022-02-23 DIAGNOSIS — K21.9: ICD-10-CM

## 2022-02-23 DIAGNOSIS — J45.909: ICD-10-CM

## 2022-02-23 DIAGNOSIS — Z90.710: ICD-10-CM

## 2022-02-23 LAB
ALBUMIN SERPL-MCNC: 3.1 G/DL (ref 3.4–5)
ALP SERPL-CCNC: 78 U/L (ref 45–117)
ALT SERPL-CCNC: 9 U/L (ref 13–56)
ANION GAP SERPL CALCULATED.3IONS-SCNC: 7 MMOL/L (ref 5–15)
BILIRUB SERPL-MCNC: 0.6 MG/DL (ref 0.2–1)
BUN SERPL-MCNC: 14 MG/DL (ref 7–18)
BUN/CREAT SERPL: 16.7
CALCIUM SERPL-MCNC: 8.6 MG/DL (ref 8.5–10.1)
CHLORIDE SERPL-SCNC: 103 MMOL/L (ref 98–107)
CO2 SERPL-SCNC: 18 MMOL/L (ref 21–32)
GLUCOSE SERPL-MCNC: 116 MG/DL (ref 74–106)
HCT VFR BLD AUTO: 30.5 % (ref 36–46)
HGB BLD-MCNC: 9.8 G/DL (ref 12.2–16.2)
LACTATE PLASV-SCNC: 2.7 MMOL/L (ref 0.4–2)
MCH RBC QN AUTO: 22 PG (ref 28–32)
MCV RBC AUTO: 68.2 FL (ref 80–100)
NRBC BLD QL AUTO: 0 %
POTASSIUM SERPL-SCNC: 4.1 MMOL/L (ref 3.5–5.1)
PROT SERPL-MCNC: 7.2 G/DL (ref 6.4–8.2)
SODIUM SERPL-SCNC: 128 MMOL/L (ref 136–145)

## 2022-02-23 PROCEDURE — 87426 SARSCOV CORONAVIRUS AG IA: CPT

## 2022-02-23 PROCEDURE — 80053 COMPREHEN METABOLIC PANEL: CPT

## 2022-02-23 PROCEDURE — 93005 ELECTROCARDIOGRAM TRACING: CPT

## 2022-02-23 PROCEDURE — 83605 ASSAY OF LACTIC ACID: CPT

## 2022-02-23 PROCEDURE — 71045 X-RAY EXAM CHEST 1 VIEW: CPT

## 2022-02-23 PROCEDURE — 36415 COLL VENOUS BLD VENIPUNCTURE: CPT

## 2022-02-23 PROCEDURE — 87040 BLOOD CULTURE FOR BACTERIA: CPT

## 2022-02-23 PROCEDURE — 84484 ASSAY OF TROPONIN QUANT: CPT

## 2022-02-23 PROCEDURE — 81001 URINALYSIS AUTO W/SCOPE: CPT

## 2022-02-23 PROCEDURE — 96361 HYDRATE IV INFUSION ADD-ON: CPT

## 2022-02-23 PROCEDURE — 99285 EMERGENCY DEPT VISIT HI MDM: CPT

## 2022-02-23 PROCEDURE — 96365 THER/PROPH/DIAG IV INF INIT: CPT

## 2022-02-23 PROCEDURE — 85025 COMPLETE CBC W/AUTO DIFF WBC: CPT

## 2022-02-24 VITALS — SYSTOLIC BLOOD PRESSURE: 118 MMHG | DIASTOLIC BLOOD PRESSURE: 76 MMHG

## 2024-12-04 ENCOUNTER — HOSPITAL ENCOUNTER (INPATIENT)
Dept: HOSPITAL 15 - ER | Age: 84
LOS: 4 days | Discharge: NURSING, REHAB OR LONG-TERM CARE | DRG: 193 | End: 2024-12-08
Attending: FAMILY MEDICINE | Admitting: NURSE PRACTITIONER
Payer: MEDICARE

## 2024-12-04 VITALS — OXYGEN SATURATION: 97 % | HEART RATE: 99 BPM | RESPIRATION RATE: 18 BRPM

## 2024-12-04 VITALS — BODY MASS INDEX: 30.12 KG/M2 | WEIGHT: 153.44 LBS | HEIGHT: 60 IN

## 2024-12-04 DIAGNOSIS — J96.01: ICD-10-CM

## 2024-12-04 DIAGNOSIS — Z88.0: ICD-10-CM

## 2024-12-04 DIAGNOSIS — I11.0: ICD-10-CM

## 2024-12-04 DIAGNOSIS — Z79.899: ICD-10-CM

## 2024-12-04 DIAGNOSIS — K21.9: ICD-10-CM

## 2024-12-04 DIAGNOSIS — B34.9: ICD-10-CM

## 2024-12-04 DIAGNOSIS — J84.10: ICD-10-CM

## 2024-12-04 DIAGNOSIS — J10.1: Primary | ICD-10-CM

## 2024-12-04 DIAGNOSIS — J45.909: ICD-10-CM

## 2024-12-04 DIAGNOSIS — Q79.1: ICD-10-CM

## 2024-12-04 DIAGNOSIS — Z88.2: ICD-10-CM

## 2024-12-04 DIAGNOSIS — E11.9: ICD-10-CM

## 2024-12-04 DIAGNOSIS — Z90.710: ICD-10-CM

## 2024-12-04 DIAGNOSIS — Z95.0: ICD-10-CM

## 2024-12-04 DIAGNOSIS — I50.33: ICD-10-CM

## 2024-12-04 DIAGNOSIS — Z20.822: ICD-10-CM

## 2024-12-04 LAB
ALBUMIN SERPL-MCNC: 3.6 G/DL (ref 3.2–4.8)
ALP SERPL-CCNC: 95 U/L (ref 46–116)
ALT SERPL-CCNC: 10 U/L (ref 7–40)
ANION GAP SERPL CALCULATED.3IONS-SCNC: 13 MMOL/L (ref 5–15)
BILIRUB SERPL-MCNC: 0.3 MG/DL (ref 0.2–1)
BUN SERPL-MCNC: 17 MG/DL (ref 9–23)
BUN/CREAT SERPL: 23.3 (ref 10–20)
CALCIUM SERPL-MCNC: 9.4 MG/DL (ref 8.7–10.4)
CHLORIDE SERPL-SCNC: 107 MMOL/L (ref 98–107)
CO2 SERPL-SCNC: 20 MMOL/L (ref 20–31)
GLUCOSE SERPL-MCNC: 100 MG/DL (ref 74–106)
HCT VFR BLD AUTO: 31.4 % (ref 36–46)
HGB BLD-MCNC: 9.8 G/DL (ref 12.2–16.2)
MAGNESIUM SERPL-MCNC: 2.1 MG/DL (ref 1.6–2.6)
MCH RBC QN AUTO: 21.7 PG (ref 28–32)
MCV RBC AUTO: 69.7 FL (ref 80–100)
NRBC BLD QL AUTO: 0.1 %
POTASSIUM SERPL-SCNC: 4.4 MMOL/L (ref 3.5–5.1)
PROT SERPL-MCNC: 6.2 G/DL (ref 5.7–8.2)
SARS-COV+SARS-COV-2 AG RESP QL IA.RAPID: NEGATIVE
SODIUM SERPL-SCNC: 140 MMOL/L (ref 136–145)

## 2024-12-04 PROCEDURE — 80053 COMPREHEN METABOLIC PANEL: CPT

## 2024-12-04 PROCEDURE — 87426 SARSCOV CORONAVIRUS AG IA: CPT

## 2024-12-04 PROCEDURE — 87804 INFLUENZA ASSAY W/OPTIC: CPT

## 2024-12-04 PROCEDURE — 71046 X-RAY EXAM CHEST 2 VIEWS: CPT

## 2024-12-04 PROCEDURE — 93005 ELECTROCARDIOGRAM TRACING: CPT

## 2024-12-04 PROCEDURE — 83735 ASSAY OF MAGNESIUM: CPT

## 2024-12-04 PROCEDURE — 36415 COLL VENOUS BLD VENIPUNCTURE: CPT

## 2024-12-04 PROCEDURE — 85025 COMPLETE CBC W/AUTO DIFF WBC: CPT

## 2024-12-04 PROCEDURE — 84484 ASSAY OF TROPONIN QUANT: CPT

## 2024-12-04 PROCEDURE — 97163 PT EVAL HIGH COMPLEX 45 MIN: CPT

## 2024-12-04 PROCEDURE — 80048 BASIC METABOLIC PNL TOTAL CA: CPT

## 2024-12-04 RX ADMIN — KETOROLAC TROMETHAMINE ONE MG: 30 INJECTION, SOLUTION INTRAMUSCULAR; INTRAVENOUS at 16:30

## 2024-12-04 RX ADMIN — SODIUM CHLORIDE ONE MLS/HR: 0.9 INJECTION, SOLUTION INTRAVENOUS at 16:30

## 2024-12-04 NOTE — ED.PDOC
SOB-HPI


HPI Comments


83 year old female BIBA presents to the ED with chief complaint of flu-like 

illness. EMS reports patient was at her neurologist's office when she begun to 

experience fatigue with associated green, productive cough and body aches. 

Patient relays that she has been experiencing these symptoms for about a week 

and requests pain medication to help with her body aches. Patient denies any 

SOB, chest pain, N/V/D, dizziness, fever, or chills.


Chief Complaint:  Body Pain


Time Seen by MD:  16:17


Primary Care Provider:  GENTRY


Reviewed notes:  Nurses Notes, Paramedic Notes, Medications, Allergies


Information Source:  Patient, Emergency Med Personnel


Mode of Arrival:  EMS


Severity:  Moderate


Timing:  Days


Duration:  Since onset


Context:  At Rest


PE Risk Factors:  None


History of:  CHF


Prehospital treatment:  None


Modifying Factors:  Nothing


Associated Signs and Symptoms:  Cough, Other (Body aches, fatigue)


If cough with SOB:  Productive, Green





Past Medical History


PAST MEDICAL HISTORY:  Arthritis, Asthma, CHF, DM, GERD, HTN


Surgical History:  , Hysterectomy, Pacemaker


GYN History:  No Pertinent GYN History





Family History


Family History:  Unknown





Social History


Smoker:  Non-Smoker


Alcohol:  Denies ETOH Use


Drugs:  Denies Drug Use


Lives In:  Home





Constitutional:  reports: fatigue, others (Body aches); denies: chills, 

diaphoresis, fever, malaise, sweats, weakness


EENTM:  denies: blurred vision, double vision, ear bleeding, ear discharge, ear 

drainage, ear pain, ear ringing, eye pain, eye redness, hearing loss, mouth 

pain, mouth swelling, nasal discharge, nose bleeding, nose congestion, nose 

pain, photophobia, tearing, throat pain, throat swelling, voice changes, others


Respiratory:  reports: cough; denies: hemoptysis, orthopnea, SOB at rest, 

shortness of breath, SOB with excertion, stridor, wheezing, others


Cardiovascular:  denies: chest pain, dizzy spells, diaphoresis, Dyspnea on 

exertion, edema, irregular heart beat, left arm pain, lightheadedness, 

palpitations, PND, syncope, others


Gastrointestinal:  denies: abdomen distended, abdominal pain, blood streaked 

bowels, constipated, diarrhea, dysphagia, difficulty swallowing, hematemesis, 

melena, nausea, poor appetite, poor fluid intake, rectal bleeding, rectal pain, 

vomiting, others


Genitourinary:  denies: abnormal vagina bleeding, burning, dyspareunia, dysuria,

flank pain, frequency, hematuria, incontinence, pain, pregnant, vagina 

discharge, urgency, others


Neurological:  denies: dizziness, fainting, headache, left sided numbness, left 

sided weakness, numbness, paresthesia, pre-existing deficit, right sided 

numbness, right sided weakness, seizure, speech problems, tingling, tremors, 

weakness, others


Musculoskeletal:  denies: back pain, gout, joint pain, joint swelling, muscle 

pain, muscle stiffness, neck pain, others


Integumetry:  denies: bruises, change in color, change in hair/nails, dryness, 

laceration, lesions, lumps, rash, wounds, others


Allergic/Immunocompromised:  denies: Difficulty Healing, Frequent Infections, 

Hives, Itching, others


Hematologic/Lymphatic:  denies: anemia, blood clots, easy bleeding, easy 

bruising, swollen glands, others


Endocrine:  denies: excessive hunger, excessive sweating, excessive thirst, 

excessive urination, flushing, intolerance to cold, intolerance to heat, 

unexplained weight gain, unexplained weight loss, others


Psychiatric:  denies: anxiety, bipolar disorder, depression, hopeless, panic 

disorder, schizophrenia, sleepless, suicidal, others


All Other Systems:  Reviewed and Negative





Physical Exam


General Appearance:  Moderate Distress, Normal


HEENT:  Normal ENT Inspection, PERRL/EOMI


Neck:  Full Range of Motion, Non-Tender, Normal, Normal Inspection


Respiratory:  Chest Non-Tender, Crackles, Expiration, Inspiration, No Accessory 

Muscle Use, No Respiratory Distress, Rhonchi


Cardiovascular:  No Edema, No JVD, No Murmur, No Gallop, Normal Peripheral 

Pulses, Regular Rate/Rhythm


Breast Exam:  Deferred


Gastrointestinal:  No Organomegaly, Non Tender, No Pulsatile Mass, Normal Bowel 

Sounds, Soft


Genitalia:  Deferred


Pelvic:  Deferred


Rectal:  Deferred


Extremities:  No calf tenderness, Normal capillary refill, Normal inspection, 

Normal range of motion, Non-tender, No pedal edema


Musculoskeletal :  


   Location:  Bilateral


   Apperance:  Normal, Other (Generalized body ache)


Neurologic:  Alert, CNs II-XII nml as Tested, Depressed Affect, No Motor 

Deficits, No Sensory Deficits


Cerebellar Function:  Normal


Reflexes:  NOT DONE


Skin:  Dry, Normal Color, Warm


Peripheral Pulses:  1+ carotid (R), 1+ carotid (L)


Lymphatic:  No Adenopathy





EKG


EKG :  


   Pulse Rate (adult):  89


   Axis:  Normal


   Cardiac Rhythm:  NSR


   Block:  LBBB





Was a procedure done?


Was a procedure done?:  No





Differential Dx


Differential Diagnosis:  Bronchitis, CHF, Hypertension, Hyperventilation, 

Hyponatremia, Pneumonia, URI, Other (Flu syndrome)





X-Ray, Labs, Meds, VS





                                   Vital Signs








  Date Time  Temp Pulse Resp B/P (MAP) Pulse Ox O2 Delivery O2 Flow Rate FiO2


 


24 16:39  89      


 


24 13:37  89      








                                       Lab








Test


 24


20:45 24


17:24 Range/Units


 


 


Influenza Type A Antigen Negative   Negative  


 


Influenza Type B Antigen Positive   Negative  


 


SARS-CoV-2 Antigen (Rapid) Negative   NEGATIVE  


 


White Blood Count


 


 4.5 


 4.4-10.8


10^3/uL


 


Red Blood Count


 


 4.50 


 4.0-5.20


10^6/uL


 


Hemoglobin  9.8 L 12.2-16.2  g/dL


 


Hematocrit  31.4 L 36.0-46.0  %


 


Mean Corpuscular Volume  69.7 L 80.0-100.0  fL


 


Mean Corpuscular Hemoglobin  21.7 L 28.0-32.0  pg


 


Mean Corpuscular Hemoglobin


Concent 


 31.1 L


 32.0-36.0  g/dL





 


Red Cell Distribution Width  20.7 H 11.8-14.3  %


 


Platelet Count


 


 267 


 140-450


10^3/uL


 


Mean Platelet Volume  8.9  6.9-10.8  fL


 


Neutrophils (%) (Auto)  65.0  37.0-80.0  %


 


Lymphocytes (%) (Auto)  21.7  10.0-50.0  %


 


Monocytes (%) (Auto)  9.8  0.0-12.0  %


 


Eosinophils (%) (Auto)  2.9  0.0-7.0  %


 


Basophils (%) (Auto)  0.6  0.0-2.0  %


 


Neutrophils # (Auto)


 


 2.9 


 1.6-8.6  10


^3/uL


 


Lymphocytes # (Auto)


 


 1.0 


 0.4-5.4  10


^3/uL


 


Monocytes # (Auto)  0.4  0-1.3  10 ^3/uL


 


Eosinophils # (Auto)  0.1  0-0.8  10 ^3/uL


 


Basophils # (Auto)  0  0-0.2  10 ^3/uL


 


Nucleated Red Blood Cells  0.1   %


 


Sodium Level  140  136-145  mmol/L


 


Potassium Level  4.4  3.5-5.1  mmol/L


 


Chloride Level  107    mmol/L


 


Carbon Dioxide Level  20  20-31  mmol/L


 


Anion Gap  13  5-15  


 


Blood Urea Nitrogen  17  9-23  mg/dL


 


Creatinine


 


 0.73 


 0.550-1.02


mg/dL


 


Glomerular Filtration Rate


Calc 


 82 


 >90  mL/min





 


BUN/Creatinine Ratio  23.3 H 10.0-20.0  


 


Serum Glucose  100    mg/dL


 


Calcium Level  9.4  8.7-10.4  mg/dL


 


Magnesium Level  2.1  1.6-2.6  mg/dL


 


Total Bilirubin  0.3  0.2-1.0  mg/dL


 


Aspartate Amino Transferase


(AST) 


 21 


 13-40  U/L





 


Alanine Aminotransferase (ALT)  10  7-40  U/L


 


Alkaline Phosphatase  95    U/L


 


Troponin I High Sensitivity  7  </=34  ng/L


 


Total Protein  6.2  5.7-8.2  g/dL


 


Albumin  3.6  3.2-4.8  g/dL





CHEST XR: FINDINGS: 





Lines and tubes: There is a left-sided dual lead pacemaker in place





Chest: 





The heart size and pulmonary vasculature is within normal limits. Calcified 

plaque projects over the aortic arch





No pleural effusion, pneumothorax, or consolidation. Linear perihilar and 

bibasilar opacities again noted





The osseous structures are grossly intact. Multilevel thoracic spondylosis there

is eventration of the right hemidiaphragm





IMPRESSION: 





Unchanged linear perihilar and bibasilar opacities which could reflect fibrosis 

/ scarring


X-Ray, Labs, Meds, VS Comment


Course in the emergency department eventful patient came in complaining of body 

ache cough congestion and flu-like syndrome blood sugar at 120


The chest x-ray shows lung fibrosis with a eventration of the right 

hemidiaphragm 


EKG shows normal sinus rhythm at 89 with left bundle-branch block 


Laboratory pending Dr. Membreno follow


Time of 1ST Reevaluation:  17:17


Reevaluation 1ST:  Unchanged


Time of 2ND Reevaluation:  18:00


Reevaluation 2ND:  Unchanged


Patient Education/Counseling:  Diagnosis, Treatment, Prognosis


Family Education/Counseling:  Diagnosis, Treatment, Prognosis, No Family Present





Departure 1


Departure


Time of Disposition:  18:02


Impression:  


   Primary Impression:  


   Flu syndrome


   Additional Impressions:  


   Body aches


   Lung fibrosis


   Hemidiaphragmatic eventration


   Influenza


Disposition:  30 STILL A PATIENT


Condition:  Fair





Additional Instructions:  


ED DISCHARGE INSTRUCTIONS


 


Instructions: 


Please read all instructions provided in this packet carefully. 





Although you have been discharged from the Emergency Department, this does not 

mean that you have a "clean bill of health".  Is possible that you are in the 

process of developing a serious illness. This is why you must return to the ED 

without fail if any new or worsening symptoms (especially if your symptoms 

include chest pain, trouble breathing, abdominal pain, fever, headache, 

confusion, trouble seeing, or trouble walking)





It is also very important that you see a primary care doctor within the next 3-5

days to follow up.





If you are unable to get an appointment, return to the ED for re-evaluation. 








Influenza (Flu): Care Instructions








Influenza (flu) is an infection in the lungs and breathing passages. It is 

caused by the influenza virus. There are different strains, or types, of the flu

virus from year to year. Unlike the common cold, the flu comes on suddenly and 

the symptoms can be more severe. These symptoms include a cough, congestion, 

fever, chills, fatigue, aches, and pains. These symptoms may last for a few 

weeks. Although the flu can make you feel very sick, it usually doesn't cause 

serious health problems.





Home treatment is usually all you need for flu symptoms. But your doctor may 

prescribe antiviral medicine to prevent other health problems, such as 

pneumonia, from developing. The risk of other health problems from the flu is 

highest for young children (under 2), older adults (over 65), pregnant women, 

and people with long-term health conditions.





Follow-up care is a key part of your treatment and safety. Be sure to make and 

go to all appointments, and call your doctor if you are having problems. It's 

also a good idea to know your test results and keep a list of the medicines you 

take.





How can you care for yourself at home?


Get plenty of rest.


Drink plenty of fluids. If you have to limit fluids because of a health problem,

talk with your doctor before you increase the amount of fluids you drink.


Take an over-the-counter pain medicine if needed, such as acetaminophen 

(Tylenol), ibuprofen (Advil, Motrin), or naproxen (Aleve), to relieve fever, 

headache, and muscle aches. Be safe with medicines. Read and follow all 

instructions on the label.


No one younger than 20 should take aspirin. It has been linked to Reye syndrome,

a serious illness.


Take any prescribed medicine exactly as directed.


Do not smoke. Smoking can make the flu worse. If you need help quitting, talk to

your doctor about stop-smoking programs and medicines. These can increase your 

chances of quitting for good.


If the skin around your nose and lips becomes sore, put some petroleum jelly 

(such as Vaseline) on the area.


To ease coughing:


Suck on cough drops or plain, hard candy.


Try an over-the-counter cough or cold medicine. Read and follow all instructions

on the label.


Raise your head at night with an extra pillow. This may help you rest if 

coughing keeps you awake.


To avoid spreading the flu


Wash your hands regularly, and keep your hands away from your face.


Stay home from school, work, and other public places until you are feeling 

better and your fever has been gone for at least 24 hours. The fever needs to 

have gone away on its own without the help of medicine.


Ask people living with you to talk to their doctors about preventing the flu. 

They may get antiviral medicine to keep from getting the flu from you.


To prevent the flu in the future, get the flu vaccine every fall. Encourage 

people living with you to get the vaccine.


Cover your mouth when you cough or sneeze. If you can, cough or sneeze into the 

bend of your elbow, not your hands.


When should you call for help?


 


Call 911 anytime you think you may need emergency care. For example, call if:





You have severe trouble breathing.


You have a seizure.


Call your doctor now or seek immediate medical care if:





You have trouble breathing.


You have a fever with a stiff neck or a severe headache.


You have pain or pressure in your chest or belly.


You have a fever or cough that returns after getting better.


You feel very sleepy, dizzy, or confused.


You are not urinating.


You have severe muscle pain.


You have severe weakness, or you are unsteady.


You have medical conditions that are getting worse.


Watch closely for changes in your health, and be sure to contact your doctor if:





You do not get better as expected.


You are having a problem with your medicine.





Critical Care Note


Critical Care Time?:  No





Stability


Stability form required:  No





Heart Score


Heart Score:  








Heart Score Response (Comments) Value


 


History Slightly Suspicious 0


 


EKG Normal 0


 


Age >65 2


 


Risk Factors                            1 or 2 risk factors 1


 


Troponin N/A 0


 


Total  3














I personally scribed for JEANIE LEAL MD ([email or web address]) on 24 at 16:21.  

Electronically submitted by Emily Reyes ([email or web address]).


I personally scribed for JEANIE LEAL MD ([email or web address]) on 24 at 17:33.  

Electronically submitted by Emily Reyes ([email or web address]).





JEANIE LEAL MD               Dec 4, 2024 16:21


NAOMIE MEMBRENO MD             Dec 4, 2024 23:35

## 2024-12-04 NOTE — [facility]
EXAM: XY CHEST TWO VIEWS ROUTINE



CLINICAL HISTORY: cp coigh



TECHNIQUE: Frontal and lateral views of the chest



WID: 



COMPARISON: 02/23/2022



FINDINGS: 



Lines and tubes: There is a left-sided dual lead pacemaker in place



Chest: 



The heart size and pulmonary vasculature is within normal limits. Calcified plaque projects over the 
aortic arch



No pleural effusion, pneumothorax, or consolidation. Linear perihilar and bibasilar opacities again n
oted



The osseous structures are grossly intact. Multilevel thoracic spondylosis there is eventration of th
e right hemidiaphragm



IMPRESSION: 



Unchanged linear perihilar and bibasilar opacities which could reflect fibrosis / scarring



Electronically Signed by: Pretty Amin at 12/04/2024 17:21:58 PM

## 2024-12-04 NOTE — ECG
[facility]

                                       

Test Date:    2024               Test Time:    13:34:12

Pat Name:     DAVID MARS        Department:   ED

Patient ID:   [id number]           Room:         1009-ER

Gender:       F                        Technician:   MIKKI

:          1940               Requested By: EMERGENCY EMERGENCY

Order Number: [id number]        Reading MD:   Kennedy Domingo

                                 Measurements

Intervals                              Axis          

Rate:         89                       P:            59

MI:           160                      QRS:          71

QRSD:         119                      T:            -84

QT:           407                                    

QTc:          496                                    

                           Interpretive Statements

Sinus rhythm

Atrial premature complex

Incomplete left bundle branch block

Electronically Signed On 2024 12:04:49 PST by Kennedy Domingo



Please click the below link to view image of tracing.

## 2024-12-05 VITALS — HEART RATE: 87 BPM | RESPIRATION RATE: 16 BRPM | OXYGEN SATURATION: 96 %

## 2024-12-05 VITALS
OXYGEN SATURATION: 97 % | HEART RATE: 99 BPM | SYSTOLIC BLOOD PRESSURE: 106 MMHG | TEMPERATURE: 98.6 F | RESPIRATION RATE: 18 BRPM | DIASTOLIC BLOOD PRESSURE: 58 MMHG

## 2024-12-05 VITALS — HEART RATE: 91 BPM | RESPIRATION RATE: 20 BRPM | OXYGEN SATURATION: 94 %

## 2024-12-05 VITALS — OXYGEN SATURATION: 96 %

## 2024-12-05 LAB
ANION GAP SERPL CALCULATED.3IONS-SCNC: 9 MMOL/L (ref 5–15)
BUN SERPL-MCNC: 13 MG/DL (ref 9–23)
BUN/CREAT SERPL: 17.8 (ref 10–20)
CALCIUM SERPL-MCNC: 9.4 MG/DL (ref 8.7–10.4)
CHLORIDE SERPL-SCNC: 108 MMOL/L (ref 98–107)
CO2 SERPL-SCNC: 23 MMOL/L (ref 20–31)
GLUCOSE SERPL-MCNC: 105 MG/DL (ref 74–106)
POTASSIUM SERPL-SCNC: 4.2 MMOL/L (ref 3.5–5.1)
SODIUM SERPL-SCNC: 140 MMOL/L (ref 136–145)

## 2024-12-05 RX ADMIN — OSELTAMIVIR PHOSPHATE ONE MG: 75 CAPSULE ORAL at 01:30

## 2024-12-05 RX ADMIN — LORAZEPAM ONE MG: 2 INJECTION, SOLUTION INTRAMUSCULAR; INTRAVENOUS at 07:38

## 2024-12-05 RX ADMIN — PANTOPRAZOLE SODIUM SCH MG: 40 TABLET, DELAYED RELEASE ORAL at 06:22

## 2024-12-05 RX ADMIN — VALSARTAN SCH MG: 80 TABLET ORAL at 10:44

## 2024-12-05 RX ADMIN — ACETAMINOPHEN PRN MG: 325 TABLET ORAL at 06:22

## 2024-12-05 RX ADMIN — ENOXAPARIN SODIUM SCH MG: 40 INJECTION SUBCUTANEOUS at 10:47

## 2024-12-05 RX ADMIN — OSELTAMIVIR PHOSPHATE SCH MG: 30 CAPSULE ORAL at 10:51

## 2024-12-05 NOTE — DVHINCON2
Date of service:  Dec 4, 2024


Referring Physician


Torres





Reason for Consultation


Medical management





History of Present Illness


This is an 83 year old female with a PMH of Arthritis, Asthma, CHF, DM, GERD, 

HTN who presents to the ED with a complaint of flu-like illness x1 week. EMS 

reports patient was at her neurologist's office for a follow-up appointment when

she begun to experience fatigue with associated productive cough with green 

phlegm and body aches. Patient is requesting pain medication to help with her 

body aches. HGB 9.8, HCT 31.4, . Influenza A is negative. COVID is 

negative. Influenza B returned positive. Chest x-ray showed unchanged linear 

perihilar and bibasilar opacities which could reflect fibrosis / scarring. 

Patient was admitted to the hospital. I am asked to consult on this patient.





Family History:  


Cardiomegaly


  G8 SISTER


Tumor


  G8 FATHER





Allergies:  


Coded Allergies:  


     Penicillins (Verified  Allergy, Unknown, 9/26/21)


     Sulfa Antibiotics (Verified  Allergy, Unknown, 9/26/21)


Home Meds


Active Scripts


Albuterol Sulfate (Albuterol Sulfate Hfa) 108 Mcg/Act Aer, 108 MCG IN Q6HPRN 

PRN, #1 AER


   Prov:SANDRA SMALLS MD         2/24/22


Levofloxacin (Levaquin) 500 Mg Tab, 500 MG PO BID, #14 TAB


   Prov:SANDRA SMALLS MD         2/24/22


Prednisone (Prednisone) 20 Mg Tab, 20 MG PO BID for 4 Days, #8 MG


   Prov:WILLARD JACOBO MD         1/26/22


Albuterol Sulfate (VENTOLIN MDI) 90 Mcg Ih, 90 MCG IN BID for 7 Days, #1 INH


   Prov:WILLARD JACOBO MD         1/26/22


Azithromycin (Azithromycin) 1 Gm Pow, 1 PACK PO ONCE, #1 PACK


   Prov:WILLARD JACOBO MD         1/26/22


Current Medications





                               Current Medications








 Medications


  (Trade)  Dose


 Ordered  Sig/Joe


 Route


 PRN Reason  Start Time


 Stop Time Status Last Admin


 


 Oseltamivir


 Phosphate


  (Tamiflu 75MG


 Capsule)  75 mg  Q12HR


 PO


   12/5/24 10:00


 12/5/24 09:20 DC  





 


 Valsartan


  (Diovan)  160 mg  DAILY


 PO


   12/5/24 10:00


     





 


 Pantoprazole


 Sodium


  (Protonix Tablet)  40 mg  DAILY@0600


 PO


   12/5/24 06:00


    12/5/24 06:22





 


 Albuterol


  (Ventolin Medneb)  2.5 mg  Q6HPRN  PRN


 NEB


 SHORTNESS OF BREATH  12/5/24 00:00


     





 


 Albuterol


  (Ventolin Medneb)  2.5 mg  Q6HPRN  PRN


 NEB


 SHORTNESS OF BREATH  12/5/24 00:00


 12/5/24 00:27 DC  





 


 Ondansetron HCl


  (Zofran)  4 mg  Q4HP  PRN


 IV


 NAUSEA / VOMITING  12/5/24 00:00


     





 


 Enoxaparin Sodium


  (Lovenox)  40 mg  DAILY


 SC


   12/5/24 10:00


     





 


 Acetaminophen


  (Tylenol Tablet)  650 mg  Q6HP  PRN


 PO


 PAIN SCALE 1-3  OR TEMP>100.4  12/5/24 00:00


    12/5/24 06:22





 


 Oseltamivir


 Phosphate


  (Tamiflu 30MG


 Capsule)  30 mg  Q12HR


 PO


   12/5/24 10:00


 12/9/24 22:01  12/5/24 10:51














Review of Systems


Constitutional:  reports: fatigue, others (Body aches); denies: chills, 

diaphoresis, fever, malaise, sweats, weakness


EENTM:  denies: blurred vision, double vision, ear bleeding, ear discharge, ear 

drainage, ear pain, ear ringing, eye pain, eye redness, hearing loss, mouth 

pain, mouth swelling, nasal discharge, nose bleeding, nose congestion, nose 

pain, photophobia, tearing, throat pain, throat swelling, voice changes, others


Respiratory:  reports: cough; denies: hemoptysis, orthopnea, SOB at rest, 

shortness of breath, SOB with excertion, stridor, wheezing, others


Cardiovascular:  denies: chest pain, dizzy spells, diaphoresis, Dyspnea on 

exertion, edema, irregular heart beat, left arm pain, lightheadedness, 

palpitations, PND, syncope, others


Gastrointestinal:  denies: abdomen distended, abdominal pain, blood streaked 

bowels, constipated, diarrhea, dysphagia, difficulty swallowing, hematemesis, 

melena, nausea, poor appetite, poor fluid intake, rectal bleeding, rectal pain, 

vomiting, others


Genitourinary:  denies: abnormal vagina bleeding, burning, dyspareunia, dysuria,

flank pain, frequency, hematuria, incontinence, pain, pregnant, vagina 

discharge, urgency, others


Neurological:  denies: dizziness, fainting, headache, left sided numbness, left 

sided weakness, numbness, paresthesia, pre-existing deficit, right sided 

numbness, right sided weakness, seizure, speech problems, tingling, tremors, 

weakness, others


Musculoskeletal:  denies: back pain, gout, joint pain, joint swelling, muscle 

pain, muscle stiffness, neck pain, others


Integumetry:  denies: bruises, change in color, change in hair/nails, dryness, 

laceration, lesions, lumps, rash, wounds, others


Allergic/Immunocompromised:  denies: Difficulty Healing, Frequent Infections, 

Hives, Itching, others


Hematologic/Lymphatic:  denies: anemia, blood clots, easy bleeding, easy 

bruising, swollen glands, others


Endocrine:  denies: excessive hunger, excessive sweating, excessive thirst, 

excessive urination, flushing, intolerance to cold, intolerance to heat, 

unexplained weight gain, unexplained weight loss, others


Psychiatric:  denies: anxiety, bipolar disorder, depression, hopeless, panic 

disorder, schizophrenia, sleepless, suicidal, others


All Other Systems:  Reviewed and Negative





Vital Signs





                                   Vital Signs








  Date Time  Temp Pulse Resp B/P (MAP) Pulse Ox O2 Delivery O2 Flow Rate FiO2


 


12/5/24 10:45  89 16 96/54 (68) 96   


 


12/5/24 09:00      Room Air 0.0 


 


12/5/24 09:00        21


 


12/5/24 08:00 98.3       





 98.3       








Physical Exam


GENERAL: Awake, alert, oriented.


LUNGS: Clear.


CARDIOVASCULAR: Heart sounds are good.


ABDOMEN: Soft.





Labs/Diagnostic Data





                                      Labs








Test


 12/5/24


07:23 12/4/24


20:45 12/4/24


17:24 Range/Units


 


 


Sodium Level 140    136-145  mmol/L


 


Potassium Level 4.2    3.5-5.1  mmol/L


 


Chloride Level 108 H     mmol/L


 


Carbon Dioxide Level 23    20-31  mmol/L


 


Anion Gap 9    5-15  


 


Blood Urea Nitrogen 13    9-23  mg/dL


 


Creatinine


 0.73 


 


 


 0.550-1.02


mg/dL


 


Glomerular Filtration Rate


Calc 82 


 


 


 >90  mL/min





 


BUN/Creatinine Ratio 17.8    10.0-20.0  


 


Serum Glucose 105      mg/dL


 


Calcium Level 9.4    8.7-10.4  mg/dL


 


Influenza Type A Antigen  Negative   Negative  


 


Influenza Type B Antigen  Positive   Negative  


 


SARS-CoV-2 Antigen (Rapid)  Negative   NEGATIVE  


 


White Blood Count


 


 


 4.5 


 4.4-10.8


10^3/uL


 


Red Blood Count


 


 


 4.50 


 4.0-5.20


10^6/uL


 


Hemoglobin   9.8 L 12.2-16.2  g/dL


 


Hematocrit   31.4 L 36.0-46.0  %


 


Mean Corpuscular Volume   69.7 L 80.0-100.0  fL


 


Mean Corpuscular Hemoglobin   21.7 L 28.0-32.0  pg


 


Mean Corpuscular Hemoglobin


Concent 


 


 31.1 L


 32.0-36.0  g/dL





 


Red Cell Distribution Width   20.7 H 11.8-14.3  %


 


Platelet Count


 


 


 267 


 140-450


10^3/uL


 


Mean Platelet Volume   8.9  6.9-10.8  fL


 


Neutrophils (%) (Auto)   65.0  37.0-80.0  %


 


Lymphocytes (%) (Auto)   21.7  10.0-50.0  %


 


Monocytes (%) (Auto)   9.8  0.0-12.0  %


 


Eosinophils (%) (Auto)   2.9  0.0-7.0  %


 


Basophils (%) (Auto)   0.6  0.0-2.0  %


 


Neutrophils # (Auto)


 


 


 2.9 


 1.6-8.6  10


^3/uL


 


Lymphocytes # (Auto)


 


 


 1.0 


 0.4-5.4  10


^3/uL


 


Monocytes # (Auto)   0.4  0-1.3  10 ^3/uL


 


Eosinophils # (Auto)   0.1  0-0.8  10 ^3/uL


 


Basophils # (Auto)   0  0-0.2  10 ^3/uL


 


Nucleated Red Blood Cells   0.1   %


 


Magnesium Level   2.1  1.6-2.6  mg/dL


 


Total Bilirubin   0.3  0.2-1.0  mg/dL


 


Aspartate Amino Transferase


(AST) 


 


 21 


 13-40  U/L





 


Alanine Aminotransferase (ALT)   10  7-40  U/L


 


Alkaline Phosphatase   95    U/L


 


Troponin I High Sensitivity   7  </=34  ng/L


 


Total Protein   6.2  5.7-8.2  g/dL


 


Albumin   3.6  3.2-4.8  g/dL











Assessment


Viral syndrome.


Influenza.


Pulmonary fibrosis.


Plan/Recommendation


I agree with your ongoing assessment and care of plan. 


DVT and GI prophylactics. 


Tamiflu.  


Additional plan as per the hospital course. 


A total of 45 minutes was spent reviewing the patient record, examining the 

patient, making a diagnostic and therapeutic plan, discussing this plan with m

edical personnel, following up on diagnostic studies and following the patient 

for clinical stability excluding any and all procedures. At least 50% of this 

time was spent in direct, face-to-face contact.


Plan discussed with:  Patient











ANUJA RINALDI MD        Dec 5, 2024 12:32

## 2024-12-05 NOTE — DVHPN2
Progress Note - Dictate


Date Seen:  Dec 5, 2024


Medical Necessity Reason


Pt with a Central, PICC or Fol:  No


Subjective


Patient was seen and evaluated in follow up. Patient is complaining of cough. 

Patient receiving Tamiflu for treatment of Influenza B. Patient is afebrile.


vital signs





                                   Vital Sign








  Date Time  Temp Pulse Resp B/P (MAP) Pulse Ox O2 Delivery O2 Flow Rate FiO2


 


12/5/24 15:02  94 16 116/78 (91) 97   


 


12/5/24 09:00      Room Air 0.0 


 


12/5/24 09:00        21


 


12/5/24 08:00 98.3       





 98.3       








medications





                               Current Medications








 Medications  Dose


 Ordered  Sig/Joe


 Route  Start Time


 Stop Time Status Last Admin


Dose Admin


 


 Valsartan  160 mg  DAILY


 PO  12/5/24 10:00


     





 


 Pantoprazole


 Sodium  40 mg  DAILY@0600


 PO  12/5/24 06:00


    12/5/24 06:22


40 MG


 


 Albuterol  2.5 mg  Q6HPRN  PRN


 NEB  12/5/24 00:00


     





 


 Ondansetron HCl  4 mg  Q4HP  PRN


 IV  12/5/24 00:00


     





 


 Enoxaparin Sodium  40 mg  DAILY


 SC  12/5/24 10:00


     





 


 Acetaminophen  650 mg  Q6HP  PRN


 PO  12/5/24 00:00


    12/5/24 06:22


650 MG


 


 Oseltamivir


 Phosphate  30 mg  Q12HR


 PO  12/5/24 10:00


 12/9/24 22:01  12/5/24 10:51


30 MG








objective


GENERAL: Awake, alert, oriented.


LUNGS: Clear.


CARDIOVASCULAR: Heart sounds are good.


ABDOMEN: Soft.


laboratory and microbiology


                                Laboratory Tests


12/5/24 07:23








12/4/24 17:24

















Test


 12/5/24


07:23 Range/Units


 


 


Serum Glucose 105    mg/dL








Problem List


Acute Hypoxic respiratory failure.


Influenza type B.


Pulmonary fibrosis


Hypertension.


Acute on chronic congestive heart failure.


History of asthma.


Presence of pacemaker.


Assessment/Plan


Continued all current supportive medical care. 


DVT and GI prophylactics. 


Tamiflu.  


Additional plan as per the hospital course.


Plan discussed with:  Patient











ANUJA RINALDI MD        Dec 5, 2024 15:54

## 2024-12-05 NOTE — DVHHP2
History of Present Illness


Reason for Visit:  Flu-like symptoms


History of Present Illness


83-year-old female presents for evaluation of flu-like symptoms.  Patient 

presents with a two day history of flu-like symptoms.  She reports fatigue with 

associated body aches and a nonproductive cough.  Reports occasional chills.  No

fever.  Denies cardiac or respiratory complaints.


Past Medical History


CHF, asthma,  hypertension, carried


Past Surgical History


Hysterectomy, pacemaker and 


Family History


Noncontributory


Smoke:  No


ALCOHOL:  none


Drugs:  None





Review of Systems


Review of Systems


Review of systems are currently negative otherwise addressed in HPI.


Allergies:  


Coded Allergies:  


     Penicillins (Verified  Allergy, Unknown, 21)


     Sulfa Antibiotics (Verified  Allergy, Unknown, 21)


Medications





                               Current Medications








 Medications  Dose


 Ordered  Sig/Joe


 Route  Start Time


 Stop Time Status Last Admin


Dose Admin


 


 Oseltamivir


 Phosphate  75 mg  Q12HR


 PO  24 10:00


 12/10/24 09:59 UNV  





 


 Valsartan  160 mg  DAILY


 PO  24 10:00


     





 


 Pantoprazole


 Sodium  40 mg  DAILY@0600


 PO  24 06:00


     





 


 Albuterol  2.5 mg  Q6HPRN  PRN


 NEB  24 00:00


     





 


 Ondansetron HCl  4 mg  Q4HP  PRN


 IV  24 00:00


     





 


 Enoxaparin Sodium  40 mg  DAILY


 SC  24 10:00


   UNV  





 


 Acetaminophen  650 mg  Q6HP  PRN


 PO  24 00:00


     














Exam


Vital Signs





Vital Signs








  Date Time  Temp Pulse Resp B/P (MAP) Pulse Ox O2 Delivery O2 Flow Rate FiO2


 


24 00:30 98.6 99 18 106/58 97   21





 98.6       


 


24 23:31      Room Air* 0 








Exam


Gen:  83-year-old female in no apparent distress.


Skin: Warm, dry, normal color and texture, no rash.


HEENT: Normocephalic atraumatic, mucous membranes moist and pink.


Neck: Cervical and supraclavicular nodes normal without enlargement, trachea is 

midline, thyroid gland is normal without masses.


Pulmonary: Clear to auscultation and percussion bilaterally.


Cardiac: Regular rate and rhythm.  No murmur


Abdomen: Soft, nontender, nondistended, bowel sounds present all 4 quadrants, no

guarding, no rigidity, no organomegaly.


Extremities: No cyanosis, clubbing, no edema


Neuro: Cranial nerves II through XII grossly intact, normal affect and speech, 

no focal motor deficits.





Labs/Xrays





ORDERING PHYSICIAN: JEANIE LEAL MD


PROCEDURE(s): CXR2 - CHEST TWO VIEWS ROUTINE


REASON: ovidio boyle


ORDER NUMBER(s): [id number], ACCESSION NUMBER(s): [id number]








EXAM: XY CHEST TWO VIEWS ROUTINE





CLINICAL HISTORY: [facility]





TECHNIQUE: Frontal and lateral views of the chest





WID: 





COMPARISON: 2022





FINDINGS: 





Lines and tubes: There is a left-sided dual lead pacemaker in place





Chest: 





The heart size and pulmonary vasculature is within normal limits. Calcified 

plaque projects over the aortic arch





No pleural effusion, pneumothorax, or consolidation. Linear perihilar and 

bibasilar opacities again noted





The osseous structures are grossly intact. Multilevel thoracic spondylosis there

is eventration of the right hemidiaphragm





IMPRESSION: 





Unchanged linear perihilar and bibasilar opacities which could reflect fibrosis 

/ scarring





Electronically Signed by: Pretty Amin at 2024 17:21:58 PM











                                      Labs








Test


 24


20:45 24


17:24 Range/Units


 


 


Influenza Type A Antigen Negative   Negative  


 


Influenza Type B Antigen Positive   Negative  


 


SARS-CoV-2 Antigen (Rapid) Negative   NEGATIVE  


 


White Blood Count


 


 4.5 


 4.4-10.8


10^3/uL


 


Red Blood Count


 


 4.50 


 4.0-5.20


10^6/uL


 


Hemoglobin  9.8 L 12.2-16.2  g/dL


 


Hematocrit  31.4 L 36.0-46.0  %


 


Mean Corpuscular Volume  69.7 L 80.0-100.0  fL


 


Mean Corpuscular Hemoglobin  21.7 L 28.0-32.0  pg


 


Mean Corpuscular Hemoglobin


Concent 


 31.1 L


 32.0-36.0  g/dL





 


Red Cell Distribution Width  20.7 H 11.8-14.3  %


 


Platelet Count


 


 267 


 140-450


10^3/uL


 


Mean Platelet Volume  8.9  6.9-10.8  fL


 


Neutrophils (%) (Auto)  65.0  37.0-80.0  %


 


Lymphocytes (%) (Auto)  21.7  10.0-50.0  %


 


Monocytes (%) (Auto)  9.8  0.0-12.0  %


 


Eosinophils (%) (Auto)  2.9  0.0-7.0  %


 


Basophils (%) (Auto)  0.6  0.0-2.0  %


 


Neutrophils # (Auto)


 


 2.9 


 1.6-8.6  10


^3/uL


 


Lymphocytes # (Auto)


 


 1.0 


 0.4-5.4  10


^3/uL


 


Monocytes # (Auto)  0.4  0-1.3  10 ^3/uL


 


Eosinophils # (Auto)  0.1  0-0.8  10 ^3/uL


 


Basophils # (Auto)  0  0-0.2  10 ^3/uL


 


Nucleated Red Blood Cells  0.1   %


 


Sodium Level  140  136-145  mmol/L


 


Potassium Level  4.4  3.5-5.1  mmol/L


 


Chloride Level  107    mmol/L


 


Carbon Dioxide Level  20  20-31  mmol/L


 


Anion Gap  13  5-15  


 


Blood Urea Nitrogen  17  9-23  mg/dL


 


Creatinine


 


 0.73 


 0.550-1.02


mg/dL


 


Glomerular Filtration Rate


Calc 


 82 


 >90  mL/min





 


BUN/Creatinine Ratio  23.3 H 10.0-20.0  


 


Serum Glucose  100    mg/dL


 


Calcium Level  9.4  8.7-10.4  mg/dL


 


Magnesium Level  2.1  1.6-2.6  mg/dL


 


Total Bilirubin  0.3  0.2-1.0  mg/dL


 


Aspartate Amino Transferase


(AST) 


 21 


 13-40  U/L





 


Alanine Aminotransferase (ALT)  10  7-40  U/L


 


Alkaline Phosphatase  95    U/L


 


Troponin I High Sensitivity  7  </=34  ng/L


 


Total Protein  6.2  5.7-8.2  g/dL


 


Albumin  3.6  3.2-4.8  g/dL











Assessment/Plan


Assessment/Plan


Assessment 


Viral syndrome 


Influenza 


Pulmonary fibrosis 





Plan


Admit the patient to [facility] to the hospitalist


[facility] 


Tamiflu 


Resume home medications


Continue treatment orders.


Plan discussed with:  Patient


My Orders





                          Orders - SUZANNA LIMA








Procedure Category Date Status





  Time 


 


Admit ADMIT 24 Transmitted





  23:48 


 


Oseltamivir 75mg PHA 24 Pending





Capsule (Tamiflu 75mg  10:00 


 


Valsartan (Diovan) PHA 24 In Process





  10:00 


 


Pantoprazole Tablet PHA 24 In Process





 (Protonix Tablet)  06:00 


 


Albuterol Medneb PHA 24 In Process





 (Ventolin Medneb)  00:00 


 


Basic Metabolic Panel LAB 24 Logged





  04:00 


 


Ondansetron Hcl PHA 24 In Process





 (Zofran)  00:00 


 


Enoxaparin Sodium PHA 24 Pending





 (Lovenox)  10:00 


 


Cardiac DIET 24 Transmitted





Diet-2gna,Lofat,Lochol  Breakfast 


 


Condition: Stable LEENA 24 In Process





  23:56 


 


Acetaminophen Tablet PHA 24 In Process





 (Tylenol Tablet)  00:00 


 


Bedrest With Bathroom LEENA 24 In Process





Privileg  23:56 











Date of Service:  Dec 4, 2024


Billing Provider:  SUZANNA LIMA


Common Visit Codes:  99223-INITIAL  INP/OBS CARE (HIGH)











SUZANNA LIMA            Dec 5, 2024 04:15

## 2024-12-05 NOTE — DVHPN2
Subjective


Shortness of breath


Reviewed:  Care Plan, H&P, Labs, Medications, Previous Orders, Radiology


Changes from previous H/P or p:  No Changes





Objective


Vitals





Vital Signs








  Date Time  Temp Pulse Resp B/P (MAP) Pulse Ox O2 Delivery O2 Flow Rate FiO2


 


12/5/24 10:45  89 16 96/54 (68) 96   


 


12/5/24 09:00      Room Air 0.0 


 


12/5/24 09:00        21


 


12/5/24 08:00 98.3       





 98.3       








Medications





                               Current Medications








 Medications  Dose


 Ordered  Sig/Joe


 Route  Start Time


 Stop Time Status Last Admin


Dose Admin


 


 Valsartan  160 mg  DAILY


 PO  12/5/24 10:00


     





 


 Pantoprazole


 Sodium  40 mg  DAILY@0600


 PO  12/5/24 06:00


    12/5/24 06:22


40 MG


 


 Albuterol  2.5 mg  Q6HPRN  PRN


 NEB  12/5/24 00:00


     





 


 Ondansetron HCl  4 mg  Q4HP  PRN


 IV  12/5/24 00:00


     





 


 Enoxaparin Sodium  40 mg  DAILY


 SC  12/5/24 10:00


     





 


 Acetaminophen  650 mg  Q6HP  PRN


 PO  12/5/24 00:00


    12/5/24 06:22


650 MG


 


 Oseltamivir


 Phosphate  30 mg  Q12HR


 PO  12/5/24 10:00


 12/9/24 22:01  12/5/24 10:51


30 MG











Laboratory Results


Laboratory Tests


12/4/24 17:24








12/5/24 07:23











Chemistry








Test


 12/4/24


17:24 12/5/24


07:23


 


Albumin


 3.6 g/dL


(3.2-4.8) 





 


Calcium Level


 9.4 mg/dL


(8.7-10.4) 9.4 mg/dL


(8.7-10.4)


 


Magnesium Level


 2.1 mg/dL


(1.6-2.6) 





 


Total Protein


 6.2 g/dL


(5.7-8.2) 











LFT








Test


 12/4/24


17:24


 


Alanine Aminotransferase (ALT) 10 U/L (7-40)  


 


Alkaline Phosphatase


 95 U/L


()


 


Aspartate Amino Transferase


(AST) 21 U/L (13-40)





 


Total Bilirubin


 0.3 mg/dL


(0.2-1.0)








Labs and/or images reviewed:  Labs reviewed by me, Image(s) reviewed by me





Assessment/Plan


Assessment/Plan


Acute Hypoxic respiratory failure:  Oxygen by nasal cannula


Acute influenza type B:  Tamiflu


Pulmonary fibrosis


Hypertension


Acute on chronic congestive heart failure : Lasix


History of asthma


Presence of pacemaker


Time spent 55 minutes 


Patient is full code


Advanced care planning time 20 minutes


Plan discussed with:  Patient





Date of Service:  Dec 5, 2024


Billing Provider:  APOORVA GARDNER MD


Common Visit Codes:  78586-[id number] INP/OBS CARE(HIGH)











APOORVA GARDNER MD                 Dec 5, 2024 12:34

## 2024-12-06 VITALS
HEART RATE: 85 BPM | RESPIRATION RATE: 18 BRPM | TEMPERATURE: 97.9 F | OXYGEN SATURATION: 95 % | DIASTOLIC BLOOD PRESSURE: 70 MMHG | SYSTOLIC BLOOD PRESSURE: 124 MMHG

## 2024-12-06 VITALS
TEMPERATURE: 98 F | DIASTOLIC BLOOD PRESSURE: 46 MMHG | OXYGEN SATURATION: 95 % | RESPIRATION RATE: 18 BRPM | HEART RATE: 78 BPM | SYSTOLIC BLOOD PRESSURE: 106 MMHG

## 2024-12-06 VITALS — OXYGEN SATURATION: 97 %

## 2024-12-06 VITALS
RESPIRATION RATE: 22 BRPM | SYSTOLIC BLOOD PRESSURE: 117 MMHG | TEMPERATURE: 97.8 F | DIASTOLIC BLOOD PRESSURE: 51 MMHG | HEART RATE: 72 BPM | OXYGEN SATURATION: 97 %

## 2024-12-06 VITALS
HEART RATE: 90 BPM | DIASTOLIC BLOOD PRESSURE: 48 MMHG | SYSTOLIC BLOOD PRESSURE: 102 MMHG | OXYGEN SATURATION: 99 % | RESPIRATION RATE: 22 BRPM | TEMPERATURE: 97.8 F

## 2024-12-06 VITALS — HEART RATE: 53 BPM | OXYGEN SATURATION: 91 % | RESPIRATION RATE: 20 BRPM

## 2024-12-06 VITALS — OXYGEN SATURATION: 97 % | HEART RATE: 80 BPM | RESPIRATION RATE: 18 BRPM

## 2024-12-06 VITALS — OXYGEN SATURATION: 95 %

## 2024-12-06 VITALS
OXYGEN SATURATION: 97 % | DIASTOLIC BLOOD PRESSURE: 60 MMHG | RESPIRATION RATE: 22 BRPM | TEMPERATURE: 97.4 F | SYSTOLIC BLOOD PRESSURE: 114 MMHG | HEART RATE: 63 BPM

## 2024-12-06 RX ADMIN — ONDANSETRON HYDROCHLORIDE PRN MG: 2 INJECTION, SOLUTION INTRAMUSCULAR; INTRAVENOUS at 17:24

## 2024-12-06 RX ADMIN — HYDROCODONE BITARTRATE AND ACETAMINOPHEN PRN TAB: 5; 325 TABLET ORAL at 13:46

## 2024-12-06 NOTE — DVHPN2
Subjective


Shortness of breath


Reviewed:  Care Plan, H&P, Labs, Medications, Previous Orders, Radiology


Changes from previous H/P or p:  No Changes





Objective


Vitals





Vital Signs








  Date Time  Temp Pulse Resp B/P (MAP) Pulse Ox O2 Delivery O2 Flow Rate FiO2


 


12/6/24 09:22    102/48    


 


12/6/24 08:53 97.8 90 22  99   





 97.8       


 


12/6/24 08:01      Room Air* 0 21








Medications





                               Current Medications








 Medications  Dose


 Ordered  Sig/Joe


 Route  Start Time


 Stop Time Status Last Admin


Dose Admin


 


 Valsartan  160 mg  DAILY


 PO  12/5/24 10:00


     





 


 Pantoprazole


 Sodium  40 mg  DAILY@0600


 PO  12/5/24 06:00


    12/6/24 06:48


40 MG


 


 Albuterol  2.5 mg  Q6HPRN  PRN


 NEB  12/5/24 00:00


     





 


 Ondansetron HCl  4 mg  Q4HP  PRN


 IV  12/5/24 00:00


     





 


 Enoxaparin Sodium  40 mg  DAILY


 SC  12/5/24 10:00


     





 


 Acetaminophen  650 mg  Q6HP  PRN


 PO  12/5/24 00:00


    12/5/24 06:22


650 MG


 


 Oseltamivir


 Phosphate  30 mg  Q12HR


 PO  12/5/24 10:00


 12/9/24 22:01  12/5/24 23:04


30 MG











Laboratory Results


Laboratory Tests


12/4/24 17:24








12/5/24 07:23








Labs and/or images reviewed:  Labs reviewed by me, Image(s) reviewed by me





Assessment/Plan


Assessment/Plan


Acute Hypoxic respiratory failure:  Oxygen by nasal cannula


Acute influenza type B:  Tamiflu


Pulmonary fibrosis


Hypertension


Acute on chronic congestive heart failure : Lasix, cardiology consult by Dr. Andrews appreciated


History of asthma


Presence of pacemaker


Time spent 55 minutes 


Patient is full code


Advanced care planning time 20 minutes


Patient came from [place] [facility]


Plan discussed with:  Patient





Date of Service:  Dec 6, 2024


Billing Provider:  APOORVA GARDNER MD


Common Visit Codes:  [id number] INP/OBS CARE(HIGH)











APOORVA GARDNER MD                 Dec 6, 2024 09:51

## 2024-12-06 NOTE — DVHPN2
Progress Note - Dictate


Date Seen:  Dec 6, 2024


Medical Necessity Reason


Pt with a Central, PICC or Fol:  No


Subjective


Patient was seen and evaluated in follow up. Patient is complaining of cough 

with SOB. Patient is diuresing with Lasix. Patient denies any cardiac symptoms.


vital signs





                                   Vital Sign








  Date Time  Temp Pulse Resp B/P (MAP) Pulse Ox O2 Delivery O2 Flow Rate FiO2


 


12/6/24 09:22    102/48    


 


12/6/24 09:07     95 Room Air 0.0 


 


12/6/24 09:07        21


 


12/6/24 08:53 97.8 90 22     





 97.8       








medications





                               Current Medications








 Medications  Dose


 Ordered  Sig/Joe


 Route  Start Time


 Stop Time Status Last Admin


Dose Admin


 


 Valsartan  160 mg  DAILY


 PO  12/5/24 10:00


     





 


 Pantoprazole


 Sodium  40 mg  DAILY@0600


 PO  12/5/24 06:00


    12/6/24 06:48


40 MG


 


 Albuterol  2.5 mg  Q6HPRN  PRN


 NEB  12/5/24 00:00


     





 


 Ondansetron HCl  4 mg  Q4HP  PRN


 IV  12/5/24 00:00


     





 


 Enoxaparin Sodium  40 mg  DAILY


 SC  12/5/24 10:00


     





 


 Acetaminophen  650 mg  Q6HP  PRN


 PO  12/5/24 00:00


    12/5/24 06:22


650 MG


 


 Oseltamivir


 Phosphate  30 mg  Q12HR


 PO  12/5/24 10:00


 12/9/24 22:01  12/6/24 10:45


30 MG








objective


GENERAL: Awake, alert, oriented.


LUNGS: Clear.


CARDIOVASCULAR: Heart sounds are good.


ABDOMEN: Soft.


laboratory and microbiology


                                Laboratory Tests


12/5/24 07:23








12/4/24 17:24

















Test


 12/5/24


07:23 Range/Units


 


 


Serum Glucose 105    mg/dL








Problem List


Acute Hypoxic respiratory failure.


Influenza type B.


Pulmonary fibrosis


Hypertension.


Acute on chronic congestive heart failure.


History of asthma.


Presence of pacemaker.


Assessment/Plan


Continued all current supportive medical care. 


DVT and GI prophylactics. 


Tamiflu.  


Additional plan as per the hospital course.


Plan discussed with:  Patient











ANUJA RINALDI MD        Dec 6, 2024 11:54

## 2024-12-07 VITALS
SYSTOLIC BLOOD PRESSURE: 104 MMHG | OXYGEN SATURATION: 94 % | HEART RATE: 79 BPM | TEMPERATURE: 98.6 F | RESPIRATION RATE: 17 BRPM | DIASTOLIC BLOOD PRESSURE: 52 MMHG

## 2024-12-07 VITALS
HEART RATE: 89 BPM | OXYGEN SATURATION: 95 % | SYSTOLIC BLOOD PRESSURE: 124 MMHG | RESPIRATION RATE: 18 BRPM | TEMPERATURE: 97.9 F | DIASTOLIC BLOOD PRESSURE: 71 MMHG

## 2024-12-07 VITALS
SYSTOLIC BLOOD PRESSURE: 107 MMHG | DIASTOLIC BLOOD PRESSURE: 75 MMHG | HEART RATE: 86 BPM | RESPIRATION RATE: 18 BRPM | TEMPERATURE: 98.2 F | OXYGEN SATURATION: 94 %

## 2024-12-07 VITALS
HEART RATE: 82 BPM | OXYGEN SATURATION: 94 % | DIASTOLIC BLOOD PRESSURE: 49 MMHG | SYSTOLIC BLOOD PRESSURE: 112 MMHG | TEMPERATURE: 98.1 F | RESPIRATION RATE: 16 BRPM

## 2024-12-07 VITALS
TEMPERATURE: 97.9 F | DIASTOLIC BLOOD PRESSURE: 70 MMHG | HEART RATE: 85 BPM | RESPIRATION RATE: 16 BRPM | SYSTOLIC BLOOD PRESSURE: 124 MMHG | OXYGEN SATURATION: 94 %

## 2024-12-07 VITALS — OXYGEN SATURATION: 94 %

## 2024-12-07 VITALS — OXYGEN SATURATION: 97 %

## 2024-12-07 VITALS
DIASTOLIC BLOOD PRESSURE: 60 MMHG | TEMPERATURE: 97.9 F | RESPIRATION RATE: 17 BRPM | HEART RATE: 75 BPM | SYSTOLIC BLOOD PRESSURE: 108 MMHG | OXYGEN SATURATION: 95 %

## 2024-12-07 VITALS — OXYGEN SATURATION: 97 % | RESPIRATION RATE: 18 BRPM

## 2024-12-07 NOTE — DVHPN2
Subjective


Shortness of breath


Reviewed:  Care Plan, H&P, Labs, Medications, Previous Orders, Radiology


Changes from previous H/P or p:  No Changes





Objective


Vitals





Vital Signs








  Date Time  Temp Pulse Resp B/P (MAP) Pulse Ox O2 Delivery O2 Flow Rate FiO2


 


12/7/24 08:00     97 Room Air* 0 21


 


12/7/24 05:00 97.9 85 16 124/70 (88)    





 97.9       








Intake/Output











                               Intake and Output 


 


 12/7/24





 07:00


 


Intake Total 815 ml


 


Balance 815 ml


 


 


 


Intake Oral 815 ml


 


# Voids 7


 


# Bowel Movements 4








Medications





                               Current Medications








 Medications  Dose


 Ordered  Sig/Joe


 Route  Start Time


 Stop Time Status Last Admin


Dose Admin


 


 Valsartan  160 mg  DAILY


 PO  12/5/24 10:00


     





 


 Pantoprazole


 Sodium  40 mg  DAILY@0600


 PO  12/5/24 06:00


    12/7/24 05:47


40 MG


 


 Albuterol  2.5 mg  Q6HPRN  PRN


 NEB  12/5/24 00:00


     





 


 Ondansetron HCl  4 mg  Q4HP  PRN


 IV  12/5/24 00:00


    12/6/24 17:24


4 MG


 


 Enoxaparin Sodium  40 mg  DAILY


 SC  12/5/24 10:00


     





 


 Acetaminophen  650 mg  Q6HP  PRN


 PO  12/5/24 00:00


    12/7/24 07:57


650 MG


 


 Oseltamivir


 Phosphate  30 mg  Q12HR


 PO  12/5/24 10:00


 12/9/24 22:01  12/6/24 22:26


30 MG


 


 Acetaminophen/


 Hydrocodone Bitart  1 tab  Q6HPRN  PRN


 PO  12/6/24 13:15


    12/7/24 04:19


1 TAB











Laboratory Results


Laboratory Tests


12/4/24 17:24








12/5/24 07:23








Labs and/or images reviewed:  Labs reviewed by me, Image(s) reviewed by me





Assessment/Plan


Assessment/Plan


Acute Hypoxic respiratory failure:  Oxygen by nasal cannula


Acute influenza type B:  Tamiflu


Pulmonary fibrosis


Hypertension


Acute on chronic congestive heart failure : Lasix, cardiology consult by Dr. Andrews appreciated


History of asthma


Presence of pacemaker


Time spent 45 minutes 


Patient is full code


Advanced care planning time 20 minutes


Patient came from [facility]


Plan discussed with:  Patient


My Orders





                            Orders - APOORVA GARDNER MD








Procedure Category Date Status





  Time 


 


* Wound Consult CONS 12/6/24 Transmitted





   


 


Hydrocodone-Acet PHA 12/6/24 In Process





5/325mg Tab (Norco  13:15 


 


Pt Request For Service PT 12/6/24 Logged





  13:43 











Date of Service:  Dec 7, 2024


Billing Provider:  APOORVA GARDNER MD


Common Visit Codes:  [id number] INP/OBS CARE(HIGH)











APOORVA GARDNER MD                 Dec 7, 2024 08:38

## 2024-12-07 NOTE — DVHPN2
Progress Note - Dictate


Date Seen:  Dec 7, 2024


Medical Necessity Reason


Pt with a Central, PICC or Fol:  No


Subjective


Patient was seen and evaluated in follow up. Sitter is at bedside. Patient is 

complaining of cough with SOB. Patient is continued on Tamiflu. Patient is 

refusing PT.


vital signs





                                   Vital Sign








  Date Time  Temp Pulse Resp B/P (MAP) Pulse Ox O2 Delivery O2 Flow Rate FiO2


 


12/7/24 09:50    112/49    


 


12/7/24 09:02     94 Room Air 0.0 


 


12/7/24 09:02        21


 


12/7/24 09:00 98.1 82 16     





 98.1       














                           Total Intake and Output   


 


 12/6/24 12/6/24 12/7/24





 15:00 23:00 07:00


 


Intake Total  440 ml 375 ml


 


Balance  440 ml 375 ml








medications





                               Current Medications








 Medications  Dose


 Ordered  Sig/Joe


 Route  Start Time


 Stop Time Status Last Admin


Dose Admin


 


 Valsartan  160 mg  DAILY


 PO  12/5/24 10:00


     





 


 Pantoprazole


 Sodium  40 mg  DAILY@0600


 PO  12/5/24 06:00


    12/7/24 05:47


40 MG


 


 Albuterol  2.5 mg  Q6HPRN  PRN


 NEB  12/5/24 00:00


     





 


 Ondansetron HCl  4 mg  Q4HP  PRN


 IV  12/5/24 00:00


    12/6/24 17:24


4 MG


 


 Enoxaparin Sodium  40 mg  DAILY


 SC  12/5/24 10:00


     





 


 Acetaminophen  650 mg  Q6HP  PRN


 PO  12/5/24 00:00


    12/7/24 07:57


650 MG


 


 Oseltamivir


 Phosphate  30 mg  Q12HR


 PO  12/5/24 10:00


 12/9/24 22:01  12/7/24 09:50


30 MG


 


 Acetaminophen/


 Hydrocodone Bitart  1 tab  Q6HPRN  PRN


 PO  12/6/24 13:15


    12/7/24 04:19


1 TAB








objective


GENERAL: Awake, alert, oriented.


LUNGS: Clear.


CARDIOVASCULAR: Heart sounds are good.


ABDOMEN: Soft.


laboratory and microbiology


                                Laboratory Tests


12/5/24 07:23








12/4/24 17:24

















Test


 12/5/24


07:23 Range/Units


 


 


Serum Glucose 105    mg/dL








Problem List


Acute hypoxic respiratory failure.


Influenza type B.


Pulmonary fibrosis


Hypertension.


Acute on chronic congestive heart failure.


History of asthma.


Presence of pacemaker.


Assessment/Plan


Continued all current supportive medical care. 


DVT and GI prophylactics. 


Tamiflu.  


Additional plan as per the hospital course.


Plan discussed with:  Patient











ANUJA RINALDI MD        Dec 7, 2024 12:03

## 2024-12-08 VITALS
OXYGEN SATURATION: 94 % | TEMPERATURE: 98.2 F | HEART RATE: 88 BPM | DIASTOLIC BLOOD PRESSURE: 72 MMHG | RESPIRATION RATE: 18 BRPM | SYSTOLIC BLOOD PRESSURE: 117 MMHG

## 2024-12-08 VITALS
HEART RATE: 80 BPM | TEMPERATURE: 98 F | OXYGEN SATURATION: 96 % | DIASTOLIC BLOOD PRESSURE: 78 MMHG | SYSTOLIC BLOOD PRESSURE: 115 MMHG | RESPIRATION RATE: 19 BRPM

## 2024-12-08 VITALS
RESPIRATION RATE: 19 BRPM | DIASTOLIC BLOOD PRESSURE: 54 MMHG | OXYGEN SATURATION: 96 % | HEART RATE: 82 BPM | TEMPERATURE: 98.7 F | SYSTOLIC BLOOD PRESSURE: 109 MMHG

## 2024-12-08 VITALS
TEMPERATURE: 98.1 F | DIASTOLIC BLOOD PRESSURE: 69 MMHG | SYSTOLIC BLOOD PRESSURE: 101 MMHG | HEART RATE: 84 BPM | RESPIRATION RATE: 18 BRPM | OXYGEN SATURATION: 94 %

## 2024-12-08 VITALS — OXYGEN SATURATION: 94 %

## 2024-12-08 NOTE — DVHPN2
Subjective


Shortness of breath


Reviewed:  Care Plan, H&P, Labs, Medications, Previous Orders, Radiology


Changes from previous H/P or p:  No Changes





Objective


Vitals





Vital Signs








  Date Time  Temp Pulse Resp B/P (MAP) Pulse Ox O2 Delivery O2 Flow Rate FiO2


 


12/8/24 05:00 98.2 88 18 117/72 (87) 94   





 98.2       


 


12/7/24 23:30      Room Air 0.0 


 


12/7/24 23:30        21








Intake/Output











                               Intake and Output 


 


 12/8/24





 07:00


 


Intake Total 1040 ml


 


Balance 1040 ml


 


 


 


Intake Oral 1040 ml


 


# Voids 7








Medications





                               Current Medications








 Medications  Dose


 Ordered  Sig/Joe


 Route  Start Time


 Stop Time Status Last Admin


Dose Admin


 


 Valsartan  160 mg  DAILY


 PO  12/5/24 10:00


     





 


 Pantoprazole


 Sodium  40 mg  DAILY@0600


 PO  12/5/24 06:00


    12/8/24 05:59


40 MG


 


 Albuterol  2.5 mg  Q6HPRN  PRN


 NEB  12/5/24 00:00


     





 


 Ondansetron HCl  4 mg  Q4HP  PRN


 IV  12/5/24 00:00


    12/6/24 17:24


4 MG


 


 Enoxaparin Sodium  40 mg  DAILY


 SC  12/5/24 10:00


     





 


 Acetaminophen  650 mg  Q6HP  PRN


 PO  12/5/24 00:00


    12/7/24 07:57


650 MG


 


 Oseltamivir


 Phosphate  30 mg  Q12HR


 PO  12/5/24 10:00


 12/9/24 22:01  12/7/24 21:45


30 MG


 


 Acetaminophen/


 Hydrocodone Bitart  1 tab  Q6HPRN  PRN


 PO  12/6/24 13:15


    12/8/24 06:00


1 TAB











Laboratory Results


Laboratory Tests


12/4/24 17:24








12/5/24 07:23








Labs and/or images reviewed:  Labs reviewed by me, Image(s) reviewed by me





Assessment/Plan


Assessment/Plan


Acute Hypoxic respiratory failure:  Oxygen by nasal cannula


Acute influenza type B:  Tamiflu


Pulmonary fibrosis


Hypertension


Acute on chronic congestive heart failure : Lasix, cardiology consult by Dr. Andrews appreciated


History of asthma


Presence of pacemaker


Time spent 45 minutes 


Patient is full code


Advanced care planning time 20 minutes


Patient came from [facility]


Examined the patient in the presence of SANTOS Mejía


Patient on room air and being discharged back to [facility] under 

the care of Dr. ROCK Andrews


Plan discussed with:  Patient





Date of Service:  Dec 8, 2024


Billing Provider:  APOORVA GARDNER MD


Common Visit Codes:  [id number] INP/OBS CARE(HIGH)











APOORVA GARDNER MD                 Dec 8, 2024 08:39

## 2024-12-08 NOTE — DVHDS2
Discharge Summary


Date of Admission


Dec 4, 2024 at 23:54





Date of Discharge:


Dec 8, 2024





Admitting Diagnosis


Shortness of breaths





Wounds:


None





Labs/Diagnostic Data:





                               Laboratory Results








Test


 12/5/24


07:23 12/4/24


20:45 12/4/24


17:24


 


Sodium Level


 140 mmol/L


(136-145) 


 





 


Potassium Level


 4.2 mmol/L


(3.5-5.1) 


 





 


Chloride Level


 108 mmol/L


() 


 





 


Carbon Dioxide Level


 23 mmol/L


(20-31) 


 





 


Anion Gap 9 (5-15)   


 


Blood Urea Nitrogen


 13 mg/dL


(9-23) 


 





 


Creatinine


 0.73 mg/dL


(0.550-1.02) 


 





 


Glomerular Filtration Rate


Calc 82 mL/min


(>90) 


 





 


BUN/Creatinine Ratio


 17.8


(10.0-20.0) 


 





 


Serum Glucose


 105 mg/dL


() 


 





 


Calcium Level


 9.4 mg/dL


(8.7-10.4) 


 





 


Influenza Type A Antigen


 


 Negative


(Negative) 





 


Influenza Type B Antigen


 


 Positive


(Negative) 





 


SARS-CoV-2 Antigen (Rapid)


 


 Negative


(NEGATIVE) 





 


White Blood Count


 


 


 4.5 10^3/uL


(4.4-10.8)


 


Red Blood Count


 


 


 4.50 10^6/uL


(4.0-5.20)


 


Hemoglobin


 


 


 9.8 g/dL


(12.2-16.2)


 


Hematocrit


 


 


 31.4 %


(36.0-46.0)


 


Mean Corpuscular Volume


 


 


 69.7 fL


(80.0-100.0)


 


Mean Corpuscular Hemoglobin


 


 


 21.7 pg


(28.0-32.0)


 


Mean Corpuscular Hemoglobin


Concent 


 


 31.1 g/dL


(32.0-36.0)


 


Red Cell Distribution Width


 


 


 20.7 %


(11.8-14.3)


 


Platelet Count


 


 


 267 10^3/uL


(140-450)


 


Mean Platelet Volume


 


 


 8.9 fL


(6.9-10.8)


 


Neutrophils (%) (Auto)


 


 


 65.0 %


(37.0-80.0)


 


Lymphocytes (%) (Auto)


 


 


 21.7 %


(10.0-50.0)


 


Monocytes (%) (Auto)


 


 


 9.8 %


(0.0-12.0)


 


Eosinophils (%) (Auto)


 


 


 2.9 %


(0.0-7.0)


 


Basophils (%) (Auto)


 


 


 0.6 %


(0.0-2.0)


 


Neutrophils # (Auto)


 


 


 2.9 10 ^3/uL


(1.6-8.6)


 


Lymphocytes # (Auto)


 


 


 1.0 10 ^3/uL


(0.4-5.4)


 


Monocytes # (Auto)


 


 


 0.4 10 ^3/uL


(0-1.3)


 


Eosinophils # (Auto)


 


 


 0.1 10 ^3/uL


(0-0.8)


 


Basophils # (Auto)


 


 


 0 10 ^3/uL


(0-0.2)


 


Nucleated Red Blood Cells   0.1 % 


 


Magnesium Level


 


 


 2.1 mg/dL


(1.6-2.6)


 


Total Bilirubin


 


 


 0.3 mg/dL


(0.2-1.0)


 


Aspartate Amino Transferase


(AST) 


 


 21 U/L (13-40) 





 


Alanine Aminotransferase (ALT)   10 U/L (7-40) 


 


Alkaline Phosphatase


 


 


 95 U/L


()


 


Troponin I High Sensitivity   7 ng/L (</=34) 


 


Total Protein


 


 


 6.2 g/dL


(5.7-8.2)


 


Albumin


 


 


 3.6 g/dL


(3.2-4.8)





                             Other Laboratory Tests


12/5/24 07:23








12/4/24 17:24











Brief Hx & Hospital Course:


83-year-old female with a history of pulmonary fibrosis hypertension chronic 

congestive heart failure asthma presence of pacemaker brought in from [facility] for shortness of breaths found to have influenza type B 

treated with the Tamiflu.  One more day of Tamiflu left patient on room air 

afebrile Lottie test negative being discharged back to [facility] 

to complete the course of Tamiflu.  PCP Dr. ROCK Andrews was informed





Consults/Reason for consult


Cardiology Dr. ROCK Andrews





Operations or Procedures


None





Condition at Discharge:


Fair





Final Diagnosis/Problems List





Acute Hypoxic respiratory failure:  Oxygen by nasal cannula





Acute influenza type B:  Tamiflu





Pulmonary fibrosis





Hypertension





Acute on chronic congestive heart failure : Lasix, cardiology consult by 


Dr. Andrews appreciated





History of asthma





Presence of pacemaker





Discharge Disposition:


Skilled Nursing Facility





Discharge Instruct/Medications


Diet:  Cardiac 2g Na,low cholest


Activity:  Light activity


Follow Up/Referral:  


Follow up with your nursing home 


Medications:  


see list


39 (Time taken for discharge summary 39 minutes)


Discharge Statement:


"Patient was advised to return to the ER or call 911 if any headaches, 

dizziness, shortness of breath, chest pain, abdominal pain, bleeding, fevers, or

worsening of medical condition.





Patient was counseled about treatment plan, medications, possible side effects, 

patientverbalized understanding. All questions were answered to the best of my 

ability.





This discharge took greater then 30 minutes in planning, reviewing 

documentation, counseling the patient, and discussing with other team members."





ASSESSMENT


ASSESSMENT


Hospital Course


Improved


Assessment


Acute Hypoxic respiratory failure:  Oxygen by nasal cannula


Acute influenza type B:  Tamiflu


Pulmonary fibrosis


Hypertension


Acute on chronic congestive heart failure : Lasix, cardiology consult by Dr. Andrews appreciated


History of asthma


Presence of pacemaker





Date of Service:  Dec 8, 2024


Billing Provider:  APOORVA GARDNER MD


Common Visit Codes:  74562-ZII/OBS DISCH DAY >30min











APOORVA GARDNER MD                 Dec 8, 2024 08:46

## 2024-12-08 NOTE — DVHPN2
Progress Note - Dictate


Date Seen:  Dec 8, 2024


Medical Necessity Reason


Pt with a Central, PICC or Fol:  No


Subjective


Patient was seen and evaluated in follow up. Sitter is at bedside. Patient is 

complaining of a cough. Patient remains on Tamiflu. Patient is pending transfer 

back to [facility], hospitals.


vital signs





                                   Vital Sign








  Date Time  Temp Pulse Resp B/P (MAP) Pulse Ox O2 Delivery O2 Flow Rate FiO2


 


12/8/24 12:46 98.1 84 18 101/69 (80) 94   





 98.1       


 


12/8/24 08:00      Room Air* 0 21














                           Total Intake and Output   


 


 12/7/24 12/7/24 12/8/24





 15:00 23:00 07:00


 


Intake Total  460 ml 580 ml


 


Balance  460 ml 580 ml








medications





                               Current Medications








 Medications  Dose


 Ordered  Sig/Joe


 Route  Start Time


 Stop Time Status Last Admin


Dose Admin


 


 Valsartan  160 mg  DAILY


 PO  12/5/24 10:00


     





 


 Pantoprazole


 Sodium  40 mg  DAILY@0600


 PO  12/5/24 06:00


    12/8/24 05:59


40 MG


 


 Albuterol  2.5 mg  Q6HPRN  PRN


 NEB  12/5/24 00:00


     





 


 Ondansetron HCl  4 mg  Q4HP  PRN


 IV  12/5/24 00:00


    12/6/24 17:24


4 MG


 


 Enoxaparin Sodium  40 mg  DAILY


 SC  12/5/24 10:00


     





 


 Acetaminophen  650 mg  Q6HP  PRN


 PO  12/5/24 00:00


    12/7/24 07:57


650 MG


 


 Oseltamivir


 Phosphate  30 mg  Q12HR


 PO  12/5/24 10:00


 12/9/24 22:01  12/8/24 11:53


30 MG


 


 Acetaminophen/


 Hydrocodone Bitart  1 tab  Q6HPRN  PRN


 PO  12/6/24 13:15


    12/8/24 11:54


1 TAB








objective


GENERAL: Awake, alert, oriented.


LUNGS: Clear.


CARDIOVASCULAR: Heart sounds are good.


ABDOMEN: Soft.


laboratory and microbiology


                                Laboratory Tests


12/5/24 07:23








12/4/24 17:24

















Test


 12/5/24


07:23 Range/Units


 


 


Serum Glucose 105    mg/dL








Problem List


Acute hypoxic respiratory failure.


Influenza type B.


Pulmonary fibrosis


Hypertension.


Acute on chronic congestive heart failure.


History of asthma.


Presence of pacemaker.


Assessment/Plan


Continued all current supportive medical care. 


DVT and GI prophylactics. 


Tamiflu.  


Additional plan as per the hospital course.


Plan discussed with:  Patient











ANUJA RINALDI MD        Dec 8, 2024 14:16